# Patient Record
Sex: MALE | Race: ASIAN | NOT HISPANIC OR LATINO | URBAN - METROPOLITAN AREA
[De-identification: names, ages, dates, MRNs, and addresses within clinical notes are randomized per-mention and may not be internally consistent; named-entity substitution may affect disease eponyms.]

---

## 2023-10-12 ENCOUNTER — INPATIENT (INPATIENT)
Facility: HOSPITAL | Age: 68
LOS: 3 days | Discharge: HOME | DRG: 287 | End: 2023-10-16
Attending: STUDENT IN AN ORGANIZED HEALTH CARE EDUCATION/TRAINING PROGRAM | Admitting: STUDENT IN AN ORGANIZED HEALTH CARE EDUCATION/TRAINING PROGRAM
Payer: MEDICAID

## 2023-10-12 VITALS
OXYGEN SATURATION: 98 % | HEART RATE: 85 BPM | TEMPERATURE: 98 F | SYSTOLIC BLOOD PRESSURE: 147 MMHG | HEIGHT: 64 IN | DIASTOLIC BLOOD PRESSURE: 89 MMHG | RESPIRATION RATE: 18 BRPM | WEIGHT: 188.05 LBS

## 2023-10-12 LAB
ALBUMIN SERPL ELPH-MCNC: 4.1 G/DL — SIGNIFICANT CHANGE UP (ref 3.3–5)
ALP SERPL-CCNC: 52 U/L — SIGNIFICANT CHANGE UP (ref 40–120)
ALT FLD-CCNC: 25 U/L — SIGNIFICANT CHANGE UP (ref 10–45)
ANION GAP SERPL CALC-SCNC: 14 MMOL/L — SIGNIFICANT CHANGE UP (ref 5–17)
APPEARANCE UR: CLEAR — SIGNIFICANT CHANGE UP
AST SERPL-CCNC: 30 U/L — SIGNIFICANT CHANGE UP (ref 10–40)
BACTERIA # UR AUTO: NEGATIVE — SIGNIFICANT CHANGE UP
BASOPHILS # BLD AUTO: 0.01 K/UL — SIGNIFICANT CHANGE UP (ref 0–0.2)
BASOPHILS NFR BLD AUTO: 0.1 % — SIGNIFICANT CHANGE UP (ref 0–2)
BILIRUB SERPL-MCNC: 0.2 MG/DL — SIGNIFICANT CHANGE UP (ref 0.2–1.2)
BILIRUB UR-MCNC: NEGATIVE — SIGNIFICANT CHANGE UP
BUN SERPL-MCNC: 18 MG/DL — SIGNIFICANT CHANGE UP (ref 7–23)
CALCIUM SERPL-MCNC: 9.8 MG/DL — SIGNIFICANT CHANGE UP (ref 8.4–10.5)
CHLORIDE SERPL-SCNC: 103 MMOL/L — SIGNIFICANT CHANGE UP (ref 96–108)
CO2 SERPL-SCNC: 22 MMOL/L — SIGNIFICANT CHANGE UP (ref 22–31)
COLOR SPEC: COLORLESS — SIGNIFICANT CHANGE UP
CREAT SERPL-MCNC: 0.97 MG/DL — SIGNIFICANT CHANGE UP (ref 0.5–1.3)
DIFF PNL FLD: NEGATIVE — SIGNIFICANT CHANGE UP
EGFR: 86 ML/MIN/1.73M2 — SIGNIFICANT CHANGE UP
EOSINOPHIL # BLD AUTO: 0 K/UL — SIGNIFICANT CHANGE UP (ref 0–0.5)
EOSINOPHIL NFR BLD AUTO: 0 % — SIGNIFICANT CHANGE UP (ref 0–6)
EPI CELLS # UR: 0 /HPF — SIGNIFICANT CHANGE UP
GLUCOSE SERPL-MCNC: 136 MG/DL — HIGH (ref 70–99)
GLUCOSE UR QL: NEGATIVE — SIGNIFICANT CHANGE UP
HCT VFR BLD CALC: 31.9 % — LOW (ref 39–50)
HGB BLD-MCNC: 10.1 G/DL — LOW (ref 13–17)
HYALINE CASTS # UR AUTO: 1 /LPF — SIGNIFICANT CHANGE UP (ref 0–2)
IMM GRANULOCYTES NFR BLD AUTO: 0.5 % — SIGNIFICANT CHANGE UP (ref 0–0.9)
KETONES UR-MCNC: NEGATIVE — SIGNIFICANT CHANGE UP
LACTATE BLDV-MCNC: 2.6 MMOL/L — HIGH (ref 0.5–2)
LEUKOCYTE ESTERASE UR-ACNC: NEGATIVE — SIGNIFICANT CHANGE UP
LIDOCAIN IGE QN: 23 U/L — SIGNIFICANT CHANGE UP (ref 7–60)
LYMPHOCYTES # BLD AUTO: 1.06 K/UL — SIGNIFICANT CHANGE UP (ref 1–3.3)
LYMPHOCYTES # BLD AUTO: 7.6 % — LOW (ref 13–44)
MAGNESIUM SERPL-MCNC: 2.3 MG/DL — SIGNIFICANT CHANGE UP (ref 1.6–2.6)
MCHC RBC-ENTMCNC: 31.3 PG — SIGNIFICANT CHANGE UP (ref 27–34)
MCHC RBC-ENTMCNC: 31.7 GM/DL — LOW (ref 32–36)
MCV RBC AUTO: 98.8 FL — SIGNIFICANT CHANGE UP (ref 80–100)
MONOCYTES # BLD AUTO: 0.48 K/UL — SIGNIFICANT CHANGE UP (ref 0–0.9)
MONOCYTES NFR BLD AUTO: 3.4 % — SIGNIFICANT CHANGE UP (ref 2–14)
NEUTROPHILS # BLD AUTO: 12.32 K/UL — HIGH (ref 1.8–7.4)
NEUTROPHILS NFR BLD AUTO: 88.4 % — HIGH (ref 43–77)
NITRITE UR-MCNC: NEGATIVE — SIGNIFICANT CHANGE UP
NRBC # BLD: 0 /100 WBCS — SIGNIFICANT CHANGE UP (ref 0–0)
NT-PROBNP SERPL-SCNC: 1530 PG/ML — HIGH (ref 0–300)
PH UR: 6 — SIGNIFICANT CHANGE UP (ref 5–8)
PLATELET # BLD AUTO: 102 K/UL — LOW (ref 150–400)
POTASSIUM SERPL-MCNC: 4.9 MMOL/L — SIGNIFICANT CHANGE UP (ref 3.5–5.3)
POTASSIUM SERPL-SCNC: 4.9 MMOL/L — SIGNIFICANT CHANGE UP (ref 3.5–5.3)
PROT SERPL-MCNC: 7.6 G/DL — SIGNIFICANT CHANGE UP (ref 6–8.3)
PROT UR-MCNC: NEGATIVE — SIGNIFICANT CHANGE UP
RBC # BLD: 3.23 M/UL — LOW (ref 4.2–5.8)
RBC # FLD: 15.8 % — HIGH (ref 10.3–14.5)
RBC CASTS # UR COMP ASSIST: 0 /HPF — SIGNIFICANT CHANGE UP (ref 0–4)
SODIUM SERPL-SCNC: 139 MMOL/L — SIGNIFICANT CHANGE UP (ref 135–145)
SP GR SPEC: 1.01 — LOW (ref 1.01–1.02)
TROPONIN T, HIGH SENSITIVITY RESULT: 11 NG/L — SIGNIFICANT CHANGE UP (ref 0–51)
TROPONIN T, HIGH SENSITIVITY RESULT: 11 NG/L — SIGNIFICANT CHANGE UP (ref 0–51)
UROBILINOGEN FLD QL: NEGATIVE — SIGNIFICANT CHANGE UP
WBC # BLD: 13.94 K/UL — HIGH (ref 3.8–10.5)
WBC # FLD AUTO: 13.94 K/UL — HIGH (ref 3.8–10.5)
WBC UR QL: 0 /HPF — SIGNIFICANT CHANGE UP (ref 0–5)

## 2023-10-12 PROCEDURE — 71275 CT ANGIOGRAPHY CHEST: CPT | Mod: 26,MA

## 2023-10-12 PROCEDURE — 71045 X-RAY EXAM CHEST 1 VIEW: CPT | Mod: 26

## 2023-10-12 PROCEDURE — 99285 EMERGENCY DEPT VISIT HI MDM: CPT

## 2023-10-12 RX ORDER — ASPIRIN/CALCIUM CARB/MAGNESIUM 324 MG
324 TABLET ORAL ONCE
Refills: 0 | Status: COMPLETED | OUTPATIENT
Start: 2023-10-12 | End: 2023-10-12

## 2023-10-12 RX ADMIN — Medication 324 MILLIGRAM(S): at 16:43

## 2023-10-12 NOTE — ED PROVIDER NOTE - PROGRESS NOTE DETAILS
spoke with pts oncologist Dr. Block who states chemo regimen can cause reversible heart failure, recommends echocardiogram and troponin trending -Saba Dooley PA-C

## 2023-10-12 NOTE — ED ADULT NURSE NOTE - OBJECTIVE STATEMENT
67 year old male pt presented to the ED accompanied by son in law who will translate Julisa as per pt, pt having sob for the past few days with increasing dyspnea on exertion since this am and chest heaviness pt is a non smoker, pt is A&Ox3 ambulatory abd soft non tender non distended pt denies fever chills no nausea no vomiting no diaphoresis no palpitations

## 2023-10-12 NOTE — ED PROVIDER NOTE - RAPID ASSESSMENT
67-year-old male PMH of multiple myeloma on Kyprolis and Darzalex presents to the ED complaining of acute onset chest pain this morning.  Patient is Julisa speaking, his son-in-law translates with patient permission, Ray Pabon.  Patient reports since this morning he has no chest pain at rest but has severe crushing chest pain with taking even 3 steps, called his outpatient providers who referred him to the emergency department.  Patient denies history of known CAD or stent placement, is a non-smoker. No ASA taken today, denies h/o AC use. Denies SOB.    Patient was seen as a rapid assessment (QPA) patient. The patient will be seen and further worked up in the main emergency department and their care will be completed by the main emergency department team along with a thorough physical exam. Receiving team will follow up on labs, analgesia, any clinical imaging, reassess and disposition as clinically indicated, all decisions regarding the progression of care will be made at their discretion. - Clifford Prado PA-C

## 2023-10-12 NOTE — ED PROVIDER NOTE - PHYSICAL EXAMINATION
A&Ox3, NAD  Lungs CTAB. No w/r/r  Cardiac  RRR  Abd soft, NT/ND  Extremities: cap refill <2, pulses in distal extremities 4+, no edema.   Skin without rash.   No focal Deficits, Strength 5/5 UE/LE.

## 2023-10-12 NOTE — ED ADULT TRIAGE NOTE - CHIEF COMPLAINT QUOTE
chest pain since yesterday. palpitation and SOB with minimal exertion today. currently being treated for multiple myeloma.

## 2023-10-12 NOTE — ED PROVIDER NOTE - CONSIDERATION OF ADMISSION OBSERVATION
Mult myoloma drug have cardiac side effects which could be dangerous to pt. Consideration of Admission/Observation

## 2023-10-12 NOTE — ED CLERICAL - NS ED CLERK NOTE PRE-ARRIVAL INFORMATION; ADDITIONAL PRE-ARRIVAL INFORMATION
CC/Reason For referral: CP, SOB. hx of multiple myeloma, on Kyprolis and Darzalex. Pt. had a recent echo prior to starting medication. Needs echo, EKG.   Preferred Consultant(if applicable):  Who admits for you (if needed):  Do you have documents you would like to fax over?  Would you still like to speak to an ED attending? Yes

## 2023-10-12 NOTE — ED PROVIDER NOTE - OBJECTIVE STATEMENT
66 yo male with pmh hypothyroidism, multiple myeloma here for evaluation of chest discomfort and shortness of breath for the past week, worsening over the past 1-2 days. Pt recently started new chemo treatment yesterday, afterwards and today has been having worsening VELAZCO and chest discomfort with ambulation. Symptoms improved with rest, denies cough, fevers, chills, nausea, vomiting, abdominal pain, no cardiac history.

## 2023-10-12 NOTE — ED PROVIDER NOTE - CLINICAL SUMMARY MEDICAL DECISION MAKING FREE TEXT BOX
Robson:67-year-old male PMH of multiple myeloma on Kyprolis and Darzalex. Patient with atypical story. Patient also at risk for PE. will get ekg and trop as well as CT chest for PE. Concern for drug related cardiac effects will necessitate admission for echo and cards eval.

## 2023-10-13 DIAGNOSIS — J45.909 UNSPECIFIED ASTHMA, UNCOMPLICATED: ICD-10-CM

## 2023-10-13 DIAGNOSIS — R06.00 DYSPNEA, UNSPECIFIED: ICD-10-CM

## 2023-10-13 DIAGNOSIS — Z29.9 ENCOUNTER FOR PROPHYLACTIC MEASURES, UNSPECIFIED: ICD-10-CM

## 2023-10-13 DIAGNOSIS — I20.89 OTHER FORMS OF ANGINA PECTORIS: ICD-10-CM

## 2023-10-13 DIAGNOSIS — C90.00 MULTIPLE MYELOMA NOT HAVING ACHIEVED REMISSION: ICD-10-CM

## 2023-10-13 DIAGNOSIS — E03.9 HYPOTHYROIDISM, UNSPECIFIED: ICD-10-CM

## 2023-10-13 DIAGNOSIS — R06.09 OTHER FORMS OF DYSPNEA: ICD-10-CM

## 2023-10-13 LAB
A1C WITH ESTIMATED AVERAGE GLUCOSE RESULT: 5.7 % — HIGH (ref 4–5.6)
ANION GAP SERPL CALC-SCNC: 12 MMOL/L — SIGNIFICANT CHANGE UP (ref 5–17)
BUN SERPL-MCNC: 24 MG/DL — HIGH (ref 7–23)
CALCIUM SERPL-MCNC: 9.3 MG/DL — SIGNIFICANT CHANGE UP (ref 8.4–10.5)
CHLORIDE SERPL-SCNC: 104 MMOL/L — SIGNIFICANT CHANGE UP (ref 96–108)
CHOLEST SERPL-MCNC: 197 MG/DL — SIGNIFICANT CHANGE UP
CO2 SERPL-SCNC: 25 MMOL/L — SIGNIFICANT CHANGE UP (ref 22–31)
CREAT SERPL-MCNC: 1.4 MG/DL — HIGH (ref 0.5–1.3)
EGFR: 55 ML/MIN/1.73M2 — LOW
ESTIMATED AVERAGE GLUCOSE: 117 MG/DL — HIGH (ref 68–114)
GLUCOSE SERPL-MCNC: 112 MG/DL — HIGH (ref 70–99)
HCT VFR BLD CALC: 31 % — LOW (ref 39–50)
HDLC SERPL-MCNC: 49 MG/DL — SIGNIFICANT CHANGE UP
HGB BLD-MCNC: 10 G/DL — LOW (ref 13–17)
LIPID PNL WITH DIRECT LDL SERPL: 109 MG/DL — HIGH
MCHC RBC-ENTMCNC: 31.1 PG — SIGNIFICANT CHANGE UP (ref 27–34)
MCHC RBC-ENTMCNC: 32.3 GM/DL — SIGNIFICANT CHANGE UP (ref 32–36)
MCV RBC AUTO: 96.3 FL — SIGNIFICANT CHANGE UP (ref 80–100)
NON HDL CHOLESTEROL: 147 MG/DL — HIGH
NRBC # BLD: 0 /100 WBCS — SIGNIFICANT CHANGE UP (ref 0–0)
PHOSPHATE SERPL-MCNC: 3.4 MG/DL — SIGNIFICANT CHANGE UP (ref 2.5–4.5)
PLATELET # BLD AUTO: 105 K/UL — LOW (ref 150–400)
POTASSIUM SERPL-MCNC: 4 MMOL/L — SIGNIFICANT CHANGE UP (ref 3.5–5.3)
POTASSIUM SERPL-SCNC: 4 MMOL/L — SIGNIFICANT CHANGE UP (ref 3.5–5.3)
RBC # BLD: 3.22 M/UL — LOW (ref 4.2–5.8)
RBC # FLD: 15.9 % — HIGH (ref 10.3–14.5)
SODIUM SERPL-SCNC: 141 MMOL/L — SIGNIFICANT CHANGE UP (ref 135–145)
TRIGL SERPL-MCNC: 223 MG/DL — HIGH
TSH SERPL-MCNC: 0.5 UIU/ML — SIGNIFICANT CHANGE UP (ref 0.27–4.2)
WBC # BLD: 11.12 K/UL — HIGH (ref 3.8–10.5)
WBC # FLD AUTO: 11.12 K/UL — HIGH (ref 3.8–10.5)

## 2023-10-13 PROCEDURE — 99223 1ST HOSP IP/OBS HIGH 75: CPT

## 2023-10-13 PROCEDURE — 93306 TTE W/DOPPLER COMPLETE: CPT | Mod: 26

## 2023-10-13 RX ORDER — ASPIRIN/CALCIUM CARB/MAGNESIUM 324 MG
81 TABLET ORAL DAILY
Refills: 0 | Status: DISCONTINUED | OUTPATIENT
Start: 2023-10-13 | End: 2023-10-14

## 2023-10-13 RX ORDER — ONDANSETRON 8 MG/1
4 TABLET, FILM COATED ORAL EVERY 8 HOURS
Refills: 0 | Status: DISCONTINUED | OUTPATIENT
Start: 2023-10-13 | End: 2023-10-16

## 2023-10-13 RX ORDER — MONTELUKAST 4 MG/1
10 TABLET, CHEWABLE ORAL DAILY
Refills: 0 | Status: DISCONTINUED | OUTPATIENT
Start: 2023-10-13 | End: 2023-10-16

## 2023-10-13 RX ORDER — ATORVASTATIN CALCIUM 80 MG/1
40 TABLET, FILM COATED ORAL AT BEDTIME
Refills: 0 | Status: DISCONTINUED | OUTPATIENT
Start: 2023-10-13 | End: 2023-10-16

## 2023-10-13 RX ORDER — ACETAMINOPHEN 500 MG
650 TABLET ORAL EVERY 6 HOURS
Refills: 0 | Status: DISCONTINUED | OUTPATIENT
Start: 2023-10-13 | End: 2023-10-16

## 2023-10-13 RX ORDER — SODIUM CHLORIDE 9 MG/ML
1000 INJECTION INTRAMUSCULAR; INTRAVENOUS; SUBCUTANEOUS
Refills: 0 | Status: DISCONTINUED | OUTPATIENT
Start: 2023-10-13 | End: 2023-10-16

## 2023-10-13 RX ORDER — ACYCLOVIR SODIUM 500 MG
400 VIAL (EA) INTRAVENOUS DAILY
Refills: 0 | Status: DISCONTINUED | OUTPATIENT
Start: 2023-10-13 | End: 2023-10-16

## 2023-10-13 RX ORDER — LEVOTHYROXINE SODIUM 125 MCG
50 TABLET ORAL DAILY
Refills: 0 | Status: DISCONTINUED | OUTPATIENT
Start: 2023-10-13 | End: 2023-10-16

## 2023-10-13 RX ORDER — LANOLIN ALCOHOL/MO/W.PET/CERES
3 CREAM (GRAM) TOPICAL AT BEDTIME
Refills: 0 | Status: DISCONTINUED | OUTPATIENT
Start: 2023-10-13 | End: 2023-10-16

## 2023-10-13 RX ORDER — ENOXAPARIN SODIUM 100 MG/ML
40 INJECTION SUBCUTANEOUS EVERY 24 HOURS
Refills: 0 | Status: DISCONTINUED | OUTPATIENT
Start: 2023-10-13 | End: 2023-10-14

## 2023-10-13 RX ORDER — LORATADINE 10 MG/1
10 TABLET ORAL DAILY
Refills: 0 | Status: DISCONTINUED | OUTPATIENT
Start: 2023-10-13 | End: 2023-10-16

## 2023-10-13 RX ADMIN — Medication 3 MILLIGRAM(S): at 21:03

## 2023-10-13 RX ADMIN — Medication 400 MILLIGRAM(S): at 16:19

## 2023-10-13 RX ADMIN — SODIUM CHLORIDE 75 MILLILITER(S): 9 INJECTION INTRAMUSCULAR; INTRAVENOUS; SUBCUTANEOUS at 16:25

## 2023-10-13 RX ADMIN — MONTELUKAST 10 MILLIGRAM(S): 4 TABLET, CHEWABLE ORAL at 12:37

## 2023-10-13 RX ADMIN — ATORVASTATIN CALCIUM 40 MILLIGRAM(S): 80 TABLET, FILM COATED ORAL at 21:04

## 2023-10-13 RX ADMIN — ENOXAPARIN SODIUM 40 MILLIGRAM(S): 100 INJECTION SUBCUTANEOUS at 06:07

## 2023-10-13 RX ADMIN — LORATADINE 10 MILLIGRAM(S): 10 TABLET ORAL at 12:37

## 2023-10-13 RX ADMIN — Medication 50 MICROGRAM(S): at 06:07

## 2023-10-13 NOTE — H&P ADULT - PROBLEM SELECTOR PLAN 2
-hematologist: Dr. Agustin Block @ [facility].   -obtain outside record. -hematologist: Dr. Agustin Block @ [facility].   -obtain outside record.  -c/w acylovir ppx  -*of note, unable to find patient on [facility]  for oxycodone. Reportedly only needs it once a week or so. Did not order.

## 2023-10-13 NOTE — H&P ADULT - PROBLEM SELECTOR PLAN 1
-r/o'd PE  -no sx at rest, trop neg x2, EKG w/o ischemic changes.   -ordered for nuclear stress test and echo   -given no resp symptom, will hold off and abx for questionable infectious process found on the CT chest. -chest pressure with shortness of breath on exertion; relieved w/ rest  -also, i/s/o anemia.   -trop neg x2,  pro-BNP 1530 and EKG w/o acute ischemic changes.   -s/p full dose ASA. No concern for ACS at this time.   -recommend cardiology consult in the morning  *pt reports he had echocardiogram done 2-3 days ago at his hematologist office before starting new chemo. Obtain outside record. -chest pressure with shortness of breath on exertion; relieved w/ rest  -also, i/s/o anemia.   -trop neg x2,  pro-BNP 1530 and EKG w/o acute ischemic changes.   -s/p full dose ASA. No concern for ACS at this time.   -recommend cardiology consult in the morning (reports never saw cardiologist before)  *pt reports he had echocardiogram done 2-3 days ago at his hematologist office before starting new chemo. Obtain outside record.

## 2023-10-13 NOTE — PATIENT PROFILE ADULT - FALL HARM RISK - HARM RISK INTERVENTIONS

## 2023-10-13 NOTE — H&P ADULT - NSHPLABSRESULTS_GEN_ALL_CORE
I personally reviewed the EKG: normal sinus rhythm, no acute ischemic changes  I personally reviewed the CXR: no acute cardpulm process    CBC:                         10.1   13.94 )-----------( 102      ( 12 Oct 2023 16:47 )             31.9       Chem:   10-12    139  |  103  |  18  ----------------------------<  136<H>  4.9   |  22  |  0.97    Ca    9.8      12 Oct 2023 16:47  Mg     2.3     10-12    TPro  7.6  /  Alb  4.1  /  TBili  0.2  /  DBili  x   /  AST  30  /  ALT  25  /  AlkPhos  52  10-12

## 2023-10-13 NOTE — H&P ADULT - HISTORY OF PRESENT ILLNESS
67M PMhx significant for MM started on Kyprolis and Darzalex.  Presenting w/ crushing chest pain w/ minimal ambulation.     Trop neg x2,  and EKG w/o ischemic changes. CTA Chest neg for PE, no cardiomegaly or pericardial effusion, no pleural effusion, b/l base bronchial thickening.   CBC w/ leukocytosis and anemia (unknown BL), thrombocytopenia (unknown baseline). CMP unremarkable, and pro-BNP 1530 (unknown baseline). Lactic acid level 2.6.   UA unremarkable.   67M PMhx significant for MM, was on different chemo previously but recently started on Kyprolis and Darzalex.  Pt reports 3-4 weeks of fatigue but attributed to chemo.   Then, on 10/12, pt had severe chest pressure (indicates whole anterior chest) with shortness of breath while ambulating on flat surface; denies diaphoresis, radiation of the pain, palpitation nausea, or vomiting. It resolved w/ rest in few minutes.     On presentation to ED, mildly hypertensive, HR 85, afebrile, and on room air.   Trop neg x2,  and EKG w/o ischemic changes. CTA Chest neg for PE, no cardiomegaly or pericardial effusion, no pleural effusion, b/l base bronchial thickening.   CBC w/ leukocytosis and anemia (unknown BL), thrombocytopenia (unknown baseline). CMP unremarkable, and pro-BNP 1530 (unknown baseline). Lactic acid level 2.6.   UA unremarkable.      Pt received full dose asa in the ED.   Pt reports that since he arrived to ED, he ambulated to bathroom few times; he had mild chest pain with first couple times, but then he did not afterwards.  67M PMhx significant for  hypothyroidism and asthma, MM, was on different chemo previously but recently started on Kyprolis and Darzalex.  Pt reports 3-4 weeks of fatigue but attributed to chemo.   Then, on 10/12, pt had severe chest pressure (indicates whole anterior chest) with shortness of breath while ambulating on flat surface; denies diaphoresis, radiation of the pain, palpitation nausea, or vomiting. It resolved w/ rest in few minutes.     On presentation to ED, mildly hypertensive, HR 85, afebrile, and on room air.   Trop neg x2,  and EKG w/o ischemic changes. CTA Chest neg for PE, no cardiomegaly or pericardial effusion, no pleural effusion, b/l base bronchial thickening.   CBC w/ leukocytosis and anemia (unknown BL), thrombocytopenia (unknown baseline). CMP unremarkable, and pro-BNP 1530 (unknown baseline). Lactic acid level 2.6.   UA unremarkable.      Pt received full dose asa in the ED.   Pt reports that since he arrived to ED, he ambulated to bathroom few times; he had mild chest pain with first couple times, but then he did not afterwards.     At this time, pt denies HA, dizziness, fever, chills, CP, SOB, palpitation, abd pain, n/v/d, urinary sx or any other sx.

## 2023-10-13 NOTE — CONSULT NOTE ADULT - SUBJECTIVE AND OBJECTIVE BOX
DATE OF SERVICE: 10-13-23 @ 17:25    CHIEF COMPLAINT:Patient is a 67y old  Male who presents with a chief complaint of chest pain (13 Oct 2023 01:48)      HISTORY OF PRESENT ILLNESS:HPI:  67M PMhx significant for  hypothyroidism and asthma, MM, was on different chemo previously but recently started on Kyprolis and Darzalex.  Pt reports 3-4 weeks of fatigue but attributed to chemo.   Then, on 10/12, pt had severe chest pressure (indicates whole anterior chest) with shortness of breath while ambulating on flat surface; denies diaphoresis, radiation of the pain, palpitation nausea, or vomiting. It resolved w/ rest in few minutes.     On presentation to ED, mildly hypertensive, HR 85, afebrile, and on room air.   Trop neg x2,  and EKG w/o ischemic changes. CTA Chest neg for PE, no cardiomegaly or pericardial effusion, no pleural effusion, b/l base bronchial thickening.   CBC w/ leukocytosis and anemia (unknown BL), thrombocytopenia (unknown baseline). CMP unremarkable, and pro-BNP 1530 (unknown baseline). Lactic acid level 2.6.   UA unremarkable.      Pt received full dose asa in the ED.   Pt reports that since he arrived to ED, he ambulated to bathroom few times; he had mild chest pain with first couple times, but then he did not afterwards.     At this time, pt denies HA, dizziness, fever, chills, CP, SOB, palpitation, abd pain, n/v/d, urinary sx or any other sx.  (13 Oct 2023 01:48)      PAST MEDICAL & SURGICAL HISTORY:  Hypothyroidism      Multiple myeloma      Asthma              MEDICATIONS:  enoxaparin Injectable 40 milliGRAM(s) SubCutaneous every 24 hours    acyclovir   Oral Tab/Cap 400 milliGRAM(s) Oral daily    loratadine 10 milliGRAM(s) Oral daily  montelukast 10 milliGRAM(s) Oral daily    acetaminophen     Tablet .. 650 milliGRAM(s) Oral every 6 hours PRN  melatonin 3 milliGRAM(s) Oral at bedtime PRN  ondansetron Injectable 4 milliGRAM(s) IV Push every 8 hours PRN    aluminum hydroxide/magnesium hydroxide/simethicone Suspension 30 milliLiter(s) Oral every 4 hours PRN    levothyroxine 50 MICROGram(s) Oral daily    sodium chloride 0.9%. 1000 milliLiter(s) IV Continuous <Continuous>      FAMILY HISTORY:  FHx: leukemia        Non-contributory    SOCIAL HISTORY:    Not an active smoker    Allergies    sulfa drugs (Unknown)    Intolerances    	    REVIEW OF SYSTEMS:  CONSTITUTIONAL: No fever  EYES: No eye pain, visual disturbances, or discharge  ENMT:  No difficulty hearing, tinnitus  NECK: No pain or stiffness  RESPIRATORY: No cough, wheezing,  CARDIOVASCULAR: + chest pain, palpitations, passing out, dizziness, or leg swelling  GASTROINTESTINAL:  No nausea, vomiting, diarrhea or constipation. No melena.  GENITOURINARY: No dysuria, hematuria  NEUROLOGICAL: No stroke like symptoms  SKIN: No burning or lesions   ENDOCRINE: No heat or cold intolerance  MUSCULOSKELETAL: No joint pain or swelling  PSYCHIATRIC: No  anxiety, mood swings  HEME/LYMPH: No bleeding gums  ALLERGY AND IMMUNOLOGIC: No hives or eczema	    All other ROS negative    PHYSICAL EXAM:  T(C): 36.6 (10-13-23 @ 17:16), Max: 36.7 (10-13-23 @ 05:56)  HR: 62 (10-13-23 @ 17:16) (62 - 88)  BP: 96/63 (10-13-23 @ 17:16) (96/63 - 167/97)  RR: 18 (10-13-23 @ 17:16) (16 - 18)  SpO2: 95% (10-13-23 @ 17:16) (95% - 99%)  Wt(kg): --  I&O's Summary      Appearance: Normal	  HEENT:   Normal oral mucosa, EOMI	  Cardiovascular:  S1 S2, No JVD,    Respiratory: Lungs clear to auscultation	  Psychiatry: Alert  Gastrointestinal:  Soft, Non-tender, + BS	  Skin: No rashes   Neurologic: Non-focal  Extremities:  No edema  Vascular: Peripheral pulses palpable    	    	  	  CARDIAC MARKERS:  Labs personally reviewed by me                                  10.0   11.12 )-----------( 105      ( 13 Oct 2023 07:22 )             31.0     10-13    141  |  104  |  24<H>  ----------------------------<  112<H>  4.0   |  25  |  1.40<H>    Ca    9.3      13 Oct 2023 07:22  Phos  3.4     10-13  Mg     2.3     10-12    TPro  7.6  /  Alb  4.1  /  TBili  0.2  /  DBili  x   /  AST  30  /  ALT  25  /  AlkPhos  52  10-12          EKG: Personally reviewed by me - NSR  Radiology: Personally reviewed by me -     < from: Xray Chest 1 View- PORTABLE-Urgent (10.12.23 @ 18:17) >  Questionable right upper lobe opacity, recommend correlation with already   ordered CT chest.    < end of copied text >  < from: CT Angio Chest PE Protocol w/ IV Cont (10.12.23 @ 21:54) >  No pulmonary embolism.  Nonspecific trace bilateral lower lobe bronchial wall thickening which   can be seen with infectious or inflammatory bronchitis.  Scattered pulmonary nodules. There are no prior chest CTs for comparison   recommend a 6 month follow-up CT.        Assessment /Plan:     67M PMhx significant for  hypothyroidism and asthma, MM, was on different chemo previously but recently started on Kyprolis and Darzalex.  Pt reports 3-4 weeks of fatigue but attributed to chemo. Then, on 10/12, pt had severe chest pressure (indicates whole anterior chest) with shortness of breath while ambulating on flat surface; denies diaphoresis, radiation of the pain, palpitation nausea, or vomiting. It resolved w/ rest in few minutes.     1. Chest Pain  - Described as severe, diffuse chest discomfort which occurred with exertion and a/w SOB  - ECG NSR with no ischemic characteristics noted  - Trop neg x2  - CT Chest neg for PE  - BNP 1530  - Start ASA 81mg PO daily, atorvastatin 40mg PO daily  - Plan for cardiac cath Sunday if CAITY resolved.    2. Hypertension  - No formal Hx of HTN  - BP elevated on presentation but now normotensive/soft BP  - Will cont to trend    3. DVT Ppx  - c/w Lovenox 40mg SQ daily    4. Hypothyroidism  - TSH noted  - c/w Synthroid    Differential diagnosis and plan of care discussed with patient after the evaluation. Counseling on diet, nutritional counseling, weight management, exercise and medication compliance was done.   Advanced care planning/advanced directives discussed with patient/family. DNR status including forceful chest compressions to attempt to restart the heart, ventilator support/artificial breathing, electric shock, artificial nutrition, health care proxy, Molst form all discussed with pt. Pt wishes to consider. More than fifteen minutes spent on discussing advanced directives.       Hema Lane DO [facility]  Cardiovascular Medicine  [address] [place] [address] Dr, [address]  Available for call or text via [employer]  Office [phone]   DATE OF SERVICE: 10-13-23 @ 17:25    CHIEF COMPLAINT:Patient is a 67y old  Male who presents with a chief complaint of chest pain (13 Oct 2023 01:48)      HISTORY OF PRESENT ILLNESS:HPI:  67M PMhx significant for  hypothyroidism and asthma, MM, was on different chemo previously but recently started on Kyprolis and Darzalex.  Pt reports 3-4 weeks of fatigue but attributed to chemo.   Then, on 10/12, pt had severe chest pressure (indicates whole anterior chest) with shortness of breath while ambulating on flat surface; denies diaphoresis, radiation of the pain, palpitation nausea, or vomiting. It resolved w/ rest in few minutes.     On presentation to ED, mildly hypertensive, HR 85, afebrile, and on room air.   Trop neg x2,  and EKG w/o ischemic changes. CTA Chest neg for PE, no cardiomegaly or pericardial effusion, no pleural effusion, b/l base bronchial thickening.   CBC w/ leukocytosis and anemia (unknown BL), thrombocytopenia (unknown baseline). CMP unremarkable, and pro-BNP 1530 (unknown baseline). Lactic acid level 2.6.   UA unremarkable.      Pt received full dose asa in the ED.   Pt reports that since he arrived to ED, he ambulated to bathroom few times; he had mild chest pain with first couple times, but then he did not afterwards.     At this time, pt denies HA, dizziness, fever, chills, CP, SOB, palpitation, abd pain, n/v/d, urinary sx or any other sx.  (13 Oct 2023 01:48)      PAST MEDICAL & SURGICAL HISTORY:  Hypothyroidism      Multiple myeloma      Asthma              MEDICATIONS:  enoxaparin Injectable 40 milliGRAM(s) SubCutaneous every 24 hours    acyclovir   Oral Tab/Cap 400 milliGRAM(s) Oral daily    loratadine 10 milliGRAM(s) Oral daily  montelukast 10 milliGRAM(s) Oral daily    acetaminophen     Tablet .. 650 milliGRAM(s) Oral every 6 hours PRN  melatonin 3 milliGRAM(s) Oral at bedtime PRN  ondansetron Injectable 4 milliGRAM(s) IV Push every 8 hours PRN    aluminum hydroxide/magnesium hydroxide/simethicone Suspension 30 milliLiter(s) Oral every 4 hours PRN    levothyroxine 50 MICROGram(s) Oral daily    sodium chloride 0.9%. 1000 milliLiter(s) IV Continuous <Continuous>      FAMILY HISTORY:  FHx: leukemia        Non-contributory    SOCIAL HISTORY:    Not an active smoker    Allergies    sulfa drugs (Unknown)    Intolerances    	    REVIEW OF SYSTEMS:  CONSTITUTIONAL: No fever  EYES: No eye pain, visual disturbances, or discharge  ENMT:  No difficulty hearing, tinnitus  NECK: No pain or stiffness  RESPIRATORY: No cough, wheezing,  CARDIOVASCULAR: + chest pain, palpitations, passing out, dizziness, or leg swelling  GASTROINTESTINAL:  No nausea, vomiting, diarrhea or constipation. No melena.  GENITOURINARY: No dysuria, hematuria  NEUROLOGICAL: No stroke like symptoms  SKIN: No burning or lesions   ENDOCRINE: No heat or cold intolerance  MUSCULOSKELETAL: No joint pain or swelling  PSYCHIATRIC: No  anxiety, mood swings  HEME/LYMPH: No bleeding gums  ALLERGY AND IMMUNOLOGIC: No hives or eczema	    All other ROS negative    PHYSICAL EXAM:  T(C): 36.6 (10-13-23 @ 17:16), Max: 36.7 (10-13-23 @ 05:56)  HR: 62 (10-13-23 @ 17:16) (62 - 88)  BP: 96/63 (10-13-23 @ 17:16) (96/63 - 167/97)  RR: 18 (10-13-23 @ 17:16) (16 - 18)  SpO2: 95% (10-13-23 @ 17:16) (95% - 99%)  Wt(kg): --  I&O's Summary      Appearance: Normal	  HEENT:   Normal oral mucosa, EOMI	  Cardiovascular:  S1 S2, No JVD,    Respiratory: Lungs clear to auscultation	  Psychiatry: Alert  Gastrointestinal:  Soft, Non-tender, + BS	  Skin: No rashes   Neurologic: Non-focal  Extremities:  No edema  Vascular: Peripheral pulses palpable    	    	  	  CARDIAC MARKERS:  Labs personally reviewed by me                                  10.0   11.12 )-----------( 105      ( 13 Oct 2023 07:22 )             31.0     10-13    141  |  104  |  24<H>  ----------------------------<  112<H>  4.0   |  25  |  1.40<H>    Ca    9.3      13 Oct 2023 07:22  Phos  3.4     10-13  Mg     2.3     10-12    TPro  7.6  /  Alb  4.1  /  TBili  0.2  /  DBili  x   /  AST  30  /  ALT  25  /  AlkPhos  52  10-12          EKG: Personally reviewed by me - NSR  Radiology: Personally reviewed by me -     < from: Xray Chest 1 View- PORTABLE-Urgent (10.12.23 @ 18:17) >  Questionable right upper lobe opacity, recommend correlation with already   ordered CT chest.    < end of copied text >  < from: CT Angio Chest PE Protocol w/ IV Cont (10.12.23 @ 21:54) >  No pulmonary embolism.  Nonspecific trace bilateral lower lobe bronchial wall thickening which   can be seen with infectious or inflammatory bronchitis.  Scattered pulmonary nodules. There are no prior chest CTs for comparison   recommend a 6 month follow-up CT.        Assessment /Plan:     67M PMhx significant for  hypothyroidism and asthma, MM, was on different chemo previously but recently started on Kyprolis and Darzalex.  Pt reports 3-4 weeks of fatigue but attributed to chemo. Then, on 10/12, pt had severe chest pressure (indicates whole anterior chest) with shortness of breath while ambulating on flat surface; denies diaphoresis, radiation of the pain, palpitation nausea, or vomiting. It resolved w/ rest in few minutes.     1. Chest Pain  - Described as severe, diffuse chest discomfort which occurred with exertion and a/w SOB  - ECG NSR with no ischemic characteristics noted  - Trop neg x2  - CT Chest neg for PE  - BNP 1530  - Obtain formal TTE to assess LVEF and r/o WMA  - Start ASA 81mg PO daily, atorvastatin 40mg PO daily  - Plan for cardiac cath Sunday if CAITY resolved.    2. Hypertension  - No formal Hx of HTN  - BP elevated on presentation but now normotensive/soft BP  - Will cont to trend    3. DVT Ppx  - c/w Lovenox 40mg SQ daily    4. Hypothyroidism  - TSH noted  - c/w Synthroid    Differential diagnosis and plan of care discussed with patient after the evaluation. Counseling on diet, nutritional counseling, weight management, exercise and medication compliance was done.   Advanced care planning/advanced directives discussed with patient/family. DNR status including forceful chest compressions to attempt to restart the heart, ventilator support/artificial breathing, electric shock, artificial nutrition, health care proxy, Molst form all discussed with pt. Pt wishes to consider. More than fifteen minutes spent on discussing advanced directives.       Hema Lane DO [facility]  Cardiovascular Medicine  [address], [address]  Available for call or text via [employer] TE[employer]  Office [phone]   DATE OF SERVICE: 10-13-23 @ 17:25    CHIEF COMPLAINT:Patient is a 67y old  Male who presents with a chief complaint of chest pain (13 Oct 2023 01:48)      HISTORY OF PRESENT ILLNESS:HPI:  67M PMhx significant for  hypothyroidism and asthma, MM, was on different chemo previously but recently started on Kyprolis and Darzalex.  Pt reports 3-4 weeks of fatigue but attributed to chemo.   Then, on 10/12, pt had severe chest pressure (indicates whole anterior chest) with shortness of breath while ambulating on flat surface; denies diaphoresis, radiation of the pain, palpitation nausea, or vomiting. It resolved w/ rest in few minutes.     On presentation to ED, mildly hypertensive, HR 85, afebrile, and on room air.   Trop neg x2,  and EKG w/o ischemic changes. CTA Chest neg for PE, no cardiomegaly or pericardial effusion, no pleural effusion, b/l base bronchial thickening.   CBC w/ leukocytosis and anemia (unknown BL), thrombocytopenia (unknown baseline). CMP unremarkable, and pro-BNP 1530 (unknown baseline). Lactic acid level 2.6.   UA unremarkable.      Pt received full dose asa in the ED.   Pt reports that since he arrived to ED, he ambulated to bathroom few times; he had mild chest pain with first couple times, but then he did not afterwards.     At this time, pt denies HA, dizziness, fever, chills, CP, SOB, palpitation, abd pain, n/v/d, urinary sx or any other sx.  (13 Oct 2023 01:48)      PAST MEDICAL & SURGICAL HISTORY:  Hypothyroidism      Multiple myeloma      Asthma              MEDICATIONS:  enoxaparin Injectable 40 milliGRAM(s) SubCutaneous every 24 hours    acyclovir   Oral Tab/Cap 400 milliGRAM(s) Oral daily    loratadine 10 milliGRAM(s) Oral daily  montelukast 10 milliGRAM(s) Oral daily    acetaminophen     Tablet .. 650 milliGRAM(s) Oral every 6 hours PRN  melatonin 3 milliGRAM(s) Oral at bedtime PRN  ondansetron Injectable 4 milliGRAM(s) IV Push every 8 hours PRN    aluminum hydroxide/magnesium hydroxide/simethicone Suspension 30 milliLiter(s) Oral every 4 hours PRN    levothyroxine 50 MICROGram(s) Oral daily    sodium chloride 0.9%. 1000 milliLiter(s) IV Continuous <Continuous>      FAMILY HISTORY:  FHx: leukemia        Non-contributory    SOCIAL HISTORY:    Not an active smoker    Allergies    sulfa drugs (Unknown)    Intolerances    	    REVIEW OF SYSTEMS:  CONSTITUTIONAL: No fever  EYES: No eye pain, visual disturbances, or discharge  ENMT:  No difficulty hearing, tinnitus  NECK: No pain or stiffness  RESPIRATORY: No cough, wheezing,  CARDIOVASCULAR: + chest pain, palpitations, passing out, dizziness, or leg swelling  GASTROINTESTINAL:  No nausea, vomiting, diarrhea or constipation. No melena.  GENITOURINARY: No dysuria, hematuria  NEUROLOGICAL: No stroke like symptoms  SKIN: No burning or lesions   ENDOCRINE: No heat or cold intolerance  MUSCULOSKELETAL: No joint pain or swelling  PSYCHIATRIC: No  anxiety, mood swings  HEME/LYMPH: No bleeding gums  ALLERGY AND IMMUNOLOGIC: No hives or eczema	    All other ROS negative    PHYSICAL EXAM:  T(C): 36.6 (10-13-23 @ 17:16), Max: 36.7 (10-13-23 @ 05:56)  HR: 62 (10-13-23 @ 17:16) (62 - 88)  BP: 96/63 (10-13-23 @ 17:16) (96/63 - 167/97)  RR: 18 (10-13-23 @ 17:16) (16 - 18)  SpO2: 95% (10-13-23 @ 17:16) (95% - 99%)  Wt(kg): --  I&O's Summary      Appearance: Normal	  HEENT:   Normal oral mucosa, EOMI	  Cardiovascular:  S1 S2, No JVD,    Respiratory: Lungs clear to auscultation	  Psychiatry: Alert  Gastrointestinal:  Soft, Non-tender, + BS	  Skin: No rashes   Neurologic: Non-focal  Extremities:  No edema  Vascular: Peripheral pulses palpable    	    	  	  CARDIAC MARKERS:  Labs personally reviewed by me                                  10.0   11.12 )-----------( 105      ( 13 Oct 2023 07:22 )             31.0     10-13    141  |  104  |  24<H>  ----------------------------<  112<H>  4.0   |  25  |  1.40<H>    Ca    9.3      13 Oct 2023 07:22  Phos  3.4     10-13  Mg     2.3     10-12    TPro  7.6  /  Alb  4.1  /  TBili  0.2  /  DBili  x   /  AST  30  /  ALT  25  /  AlkPhos  52  10-12          EKG: Personally reviewed by me - NSR  Radiology: Personally reviewed by me -     < from: Xray Chest 1 View- PORTABLE-Urgent (10.12.23 @ 18:17) >  Questionable right upper lobe opacity, recommend correlation with already   ordered CT chest.    < end of copied text >  < from: CT Angio Chest PE Protocol w/ IV Cont (10.12.23 @ 21:54) >  No pulmonary embolism.  Nonspecific trace bilateral lower lobe bronchial wall thickening which   can be seen with infectious or inflammatory bronchitis.  Scattered pulmonary nodules. There are no prior chest CTs for comparison   recommend a 6 month follow-up CT.        Assessment /Plan:     67M PMhx significant for  hypothyroidism and asthma, MM, was on different chemo previously but recently started on Kyprolis and Darzalex.  Pt reports 3-4 weeks of fatigue but attributed to chemo. Then, on 10/12, pt had severe chest pressure (indicates whole anterior chest) with shortness of breath while ambulating on flat surface; denies diaphoresis, radiation of the pain, palpitation nausea, or vomiting. It resolved w/ rest in few minutes.     1. Typical Angina  - Described as chest pressure and SOB which occurred with exertion x 1 month   - ECG NSR with no ischemic characteristics noted  - Trop neg x2  - CT Chest neg for PE  - BNP 1530  - Obtain formal TTE to assess LVEF and r/o WMA  - Start ASA 81mg PO daily, atorvastatin 40mg PO daily  - Plan for cardiac cath Sunday if CAITY resolved.    2. Hypertension  - No formal Hx of HTN  - BP elevated on presentation but now normotensive/soft BP  - Will cont to trend    3. DVT Ppx  - c/w Lovenox 40mg SQ daily    4. Hypothyroidism  - TSH noted  - c/w Synthroid          Differential diagnosis and plan of care discussed with patient after the evaluation. Counseling on diet, nutritional counseling, weight management, exercise and medication compliance was done.   Advanced care planning/advanced directives discussed with patient/family. DNR status including forceful chest compressions to attempt to restart the heart, ventilator support/artificial breathing, electric shock, artificial nutrition, health care proxy, Molst form all discussed with pt. Pt wishes to consider. More than fifteen minutes spent on discussing advanced directives.       Hema Lane DO [facility]  Cardiovascular Medicine  [address], [address]  Available for call or text via [employer]  Office [phone]

## 2023-10-13 NOTE — H&P ADULT - ASSESSMENT
67M PMhx significant for hypothyroidism, asthma, and MM with active tx. Presenting with typical chest pain.

## 2023-10-13 NOTE — H&P ADULT - NSHPADDITIONALINFOADULT_GEN_ALL_CORE
Fluid: po intake   Electrolytes: replete potassium, phosphorus and magnesium to 4/3/2 respectively  Nutrition: dash  Activity: as tolerated     CODE STATUS: FULL CODE   Preferred contact:  Ray Pabon ([facility]) [phone]    Med rec:  Done with pictures of med bottles provided by [facility].

## 2023-10-13 NOTE — H&P ADULT - NSHPPHYSICALEXAM_GEN_ALL_CORE
Vital Signs Last 24 Hrs  T(C): 36.4 (12 Oct 2023 22:27), Max: 36.6 (12 Oct 2023 13:17)  T(F): 97.6 (12 Oct 2023 22:27), Max: 97.8 (12 Oct 2023 13:17)  HR: 82 (12 Oct 2023 22:27) (79 - 86)  BP: 144/90 (12 Oct 2023 22:27) (144/90 - 167/97)  BP(mean): --  RR: 17 (12 Oct 2023 22:27) (16 - 20)  SpO2: 99% (12 Oct 2023 22:27) (98% - 99%)    Parameters below as of 12 Oct 2023 22:27  Patient On (Oxygen Delivery Method): room air        CONSTITUTIONAL: Well-groomed, in no apparent distress  EYES: No conjunctival or scleral injection, non-icteric  ENMT: No external nasal lesions; MMM  RESPIRATORY: Breathing comfortably; lungs CTA without wheeze/rhonchi/rales  CARDIOVASCULAR: +S1S2, RRR, no M/G/R; pedal pulses full and symmetric; trace b/l lower extremity edema  GASTROINTESTINAL: No tenderness, +BS throughout, no rebound/guarding  SKIN: No rashes or ulcers noted  NEUROLOGIC: No gross focal neurological deficits, AAOX3  PSYCHIATRIC: mood and affect appropriate; appropriate insight and judgment

## 2023-10-14 LAB
ANION GAP SERPL CALC-SCNC: 10 MMOL/L — SIGNIFICANT CHANGE UP (ref 5–17)
BUN SERPL-MCNC: 25 MG/DL — HIGH (ref 7–23)
CALCIUM SERPL-MCNC: 8.6 MG/DL — SIGNIFICANT CHANGE UP (ref 8.4–10.5)
CHLORIDE SERPL-SCNC: 104 MMOL/L — SIGNIFICANT CHANGE UP (ref 96–108)
CO2 SERPL-SCNC: 25 MMOL/L — SIGNIFICANT CHANGE UP (ref 22–31)
CREAT SERPL-MCNC: 1.2 MG/DL — SIGNIFICANT CHANGE UP (ref 0.5–1.3)
EGFR: 66 ML/MIN/1.73M2 — SIGNIFICANT CHANGE UP
GLUCOSE SERPL-MCNC: 88 MG/DL — SIGNIFICANT CHANGE UP (ref 70–99)
HCT VFR BLD CALC: 33.1 % — LOW (ref 39–50)
HCV AB S/CO SERPL IA: 0.12 S/CO — SIGNIFICANT CHANGE UP (ref 0–0.99)
HCV AB SERPL-IMP: SIGNIFICANT CHANGE UP
HGB BLD-MCNC: 10.3 G/DL — LOW (ref 13–17)
MCHC RBC-ENTMCNC: 30.7 PG — SIGNIFICANT CHANGE UP (ref 27–34)
MCHC RBC-ENTMCNC: 31.1 GM/DL — LOW (ref 32–36)
MCV RBC AUTO: 98.8 FL — SIGNIFICANT CHANGE UP (ref 80–100)
NRBC # BLD: 1 /100 WBCS — HIGH (ref 0–0)
PLATELET # BLD AUTO: 107 K/UL — LOW (ref 150–400)
POTASSIUM SERPL-MCNC: 4.4 MMOL/L — SIGNIFICANT CHANGE UP (ref 3.5–5.3)
POTASSIUM SERPL-SCNC: 4.4 MMOL/L — SIGNIFICANT CHANGE UP (ref 3.5–5.3)
RBC # BLD: 3.35 M/UL — LOW (ref 4.2–5.8)
RBC # FLD: 15.9 % — HIGH (ref 10.3–14.5)
SODIUM SERPL-SCNC: 139 MMOL/L — SIGNIFICANT CHANGE UP (ref 135–145)
WBC # BLD: 7.42 K/UL — SIGNIFICANT CHANGE UP (ref 3.8–10.5)
WBC # FLD AUTO: 7.42 K/UL — SIGNIFICANT CHANGE UP (ref 3.8–10.5)

## 2023-10-14 PROCEDURE — 99232 SBSQ HOSP IP/OBS MODERATE 35: CPT

## 2023-10-14 RX ORDER — CHLORHEXIDINE GLUCONATE 213 G/1000ML
1 SOLUTION TOPICAL DAILY
Refills: 0 | Status: DISCONTINUED | OUTPATIENT
Start: 2023-10-14 | End: 2023-10-16

## 2023-10-14 RX ORDER — ASPIRIN/CALCIUM CARB/MAGNESIUM 324 MG
81 TABLET ORAL DAILY
Refills: 0 | Status: DISCONTINUED | OUTPATIENT
Start: 2023-10-14 | End: 2023-10-16

## 2023-10-14 RX ADMIN — MONTELUKAST 10 MILLIGRAM(S): 4 TABLET, CHEWABLE ORAL at 13:01

## 2023-10-14 RX ADMIN — CHLORHEXIDINE GLUCONATE 1 APPLICATION(S): 213 SOLUTION TOPICAL at 13:00

## 2023-10-14 RX ADMIN — Medication 400 MILLIGRAM(S): at 13:01

## 2023-10-14 RX ADMIN — LORATADINE 10 MILLIGRAM(S): 10 TABLET ORAL at 13:01

## 2023-10-14 RX ADMIN — Medication 81 MILLIGRAM(S): at 13:19

## 2023-10-14 RX ADMIN — ATORVASTATIN CALCIUM 40 MILLIGRAM(S): 80 TABLET, FILM COATED ORAL at 21:35

## 2023-10-14 RX ADMIN — Medication 50 MICROGRAM(S): at 05:00

## 2023-10-14 RX ADMIN — ENOXAPARIN SODIUM 40 MILLIGRAM(S): 100 INJECTION SUBCUTANEOUS at 05:01

## 2023-10-14 NOTE — PROGRESS NOTE ADULT - SUBJECTIVE AND OBJECTIVE BOX
PROGRESS NOTE:     Patient is a 67y old  Male who presents with a chief complaint of chest pain (13 Oct 2023 17:25)      SUBJECTIVE / OVERNIGHT EVENTS: No acute overnight events. pt is chest pain free at this time, Denies shortness of breath, palpitations, light headedness, or dizziness.     ADDITIONAL REVIEW OF SYSTEMS:    MEDICATIONS  (STANDING):  acyclovir   Oral Tab/Cap 400 milliGRAM(s) Oral daily  aspirin  chewable 81 milliGRAM(s) Oral daily  atorvastatin 40 milliGRAM(s) Oral at bedtime  chlorhexidine 2% Cloths 1 Application(s) Topical daily  enoxaparin Injectable 40 milliGRAM(s) SubCutaneous every 24 hours  levothyroxine 50 MICROGram(s) Oral daily  loratadine 10 milliGRAM(s) Oral daily  montelukast 10 milliGRAM(s) Oral daily  sodium chloride 0.9%. 1000 milliLiter(s) (75 mL/Hr) IV Continuous <Continuous>    MEDICATIONS  (PRN):  acetaminophen     Tablet .. 650 milliGRAM(s) Oral every 6 hours PRN Temp greater or equal to 38C (100.4F), Mild Pain (1 - 3)  aluminum hydroxide/magnesium hydroxide/simethicone Suspension 30 milliLiter(s) Oral every 4 hours PRN Dyspepsia  melatonin 3 milliGRAM(s) Oral at bedtime PRN Insomnia  ondansetron Injectable 4 milliGRAM(s) IV Push every 8 hours PRN Nausea and/or Vomiting      CAPILLARY BLOOD GLUCOSE        I&O's Summary    13 Oct 2023 07:01  -  14 Oct 2023 07:00  --------------------------------------------------------  IN: 900 mL / OUT: 550 mL / NET: 350 mL        PHYSICAL EXAM:  Vital Signs Last 24 Hrs  T(C): 36.3 (14 Oct 2023 04:18), Max: 36.6 (13 Oct 2023 17:16)  T(F): 97.3 (14 Oct 2023 04:18), Max: 97.9 (13 Oct 2023 17:16)  HR: 72 (14 Oct 2023 04:18) (62 - 76)  BP: 119/78 (14 Oct 2023 04:18) (96/63 - 126/83)  BP(mean): --  RR: 18 (14 Oct 2023 04:18) (18 - 18)  SpO2: 99% (14 Oct 2023 04:18) (94% - 99%)    Parameters below as of 14 Oct 2023 04:18  Patient On (Oxygen Delivery Method): room air        CONSTITUTIONAL:   CONSTITUTIONAL: Well-groomed, in no apparent distress  EYES: No conjunctival or scleral injection, non-icteric  ENMT: No external nasal lesions; MMM  RESPIRATORY: Breathing comfortably; lungs CTA without wheeze/rhonchi/rales  CARDIOVASCULAR: +S1S2, RRR, no M/G/R; pedal pulses full and symmetric; trace b/l lower extremity edema  GASTROINTESTINAL: No tenderness, +BS throughout, no rebound/guarding  SKIN: No rashes or ulcers noted  NEUROLOGIC: No gross focal neurological deficits, AAOX3  PSYCHIATRIC: mood and affect appropriate; appropriate insight and judgment  LABS:                        10.3   7.42  )-----------( 107      ( 14 Oct 2023 06:40 )             33.1     10-14    139  |  104  |  25<H>  ----------------------------<  88  4.4   |  25  |  1.20    Ca    8.6      14 Oct 2023 06:42  Phos  3.4     10-13  Mg     2.3     10-12    TPro  7.6  /  Alb  4.1  /  TBili  0.2  /  DBili  x   /  AST  30  /  ALT  25  /  AlkPhos  52  10-12          Urinalysis Basic - ( 14 Oct 2023 06:42 )    Color: x / Appearance: x / SG: x / pH: x  Gluc: 88 mg/dL / Ketone: x  / Bili: x / Urobili: x   Blood: x / Protein: x / Nitrite: x   Leuk Esterase: x / RBC: x / WBC x   Sq Epi: x / Non Sq Epi: x / Bacteria: x          RADIOLOGY & ADDITIONAL TESTS:  Results Reviewed:   Imaging Personally Reviewed:  Electrocardiogram Personally Reviewed:    COORDINATION OF CARE:  Care Discussed with Consultants/Other Providers [Y/N]:  Prior or Outpatient Records Reviewed [Y/N]:

## 2023-10-14 NOTE — PROGRESS NOTE ADULT - SUBJECTIVE AND OBJECTIVE BOX
DATE OF SERVICE: 10-14-23 @ 12:45    Patient is a 67y old  Male who presents with a chief complaint of chest pain (14 Oct 2023 11:59)      INTERVAL HISTORY: Feels ok. Son at bedside providing Julisa translation. Declines formal translation services.     REVIEW OF SYSTEMS:  CONSTITUTIONAL: No weakness  EYES/ENT: No visual changes;  No throat pain   NECK: No pain or stiffness  RESPIRATORY: No cough, wheezing; No shortness of breath  CARDIOVASCULAR: No chest pain or palpitations  GASTROINTESTINAL: No abdominal  pain. No nausea, vomiting, or hematemesis  GENITOURINARY: No dysuria, frequency or hematuria  NEUROLOGICAL: No stroke like symptoms  SKIN: No rashes    TELEMETRY Personally reviewed: SR   	  MEDICATIONS:        PHYSICAL EXAM:  T(C): 36.3 (10-14-23 @ 04:18), Max: 36.6 (10-13-23 @ 17:16)  HR: 72 (10-14-23 @ 04:18) (62 - 76)  BP: 119/78 (10-14-23 @ 04:18) (96/63 - 126/83)  RR: 18 (10-14-23 @ 04:18) (18 - 18)  SpO2: 99% (10-14-23 @ 04:18) (94% - 99%)  Wt(kg): --  I&O's Summary    13 Oct 2023 07:01  -  14 Oct 2023 07:00  --------------------------------------------------------  IN: 900 mL / OUT: 550 mL / NET: 350 mL          Appearance: In no distress	  HEENT:    PERRL, EOMI	  Cardiovascular:  S1 S2, No JVD  Respiratory: Lungs clear to auscultation	  Gastrointestinal:  Soft, Non-tender, + BS	  Vascularature:  No edema of LE  Psychiatric: Appropriate affect   Neuro: no acute focal deficits                               10.3   7.42  )-----------( 107      ( 14 Oct 2023 06:40 )             33.1     10-14    139  |  104  |  25<H>  ----------------------------<  88  4.4   |  25  |  1.20    Ca    8.6      14 Oct 2023 06:42  Phos  3.4     10-13  Mg     2.3     10-12    TPro  7.6  /  Alb  4.1  /  TBili  0.2  /  DBili  x   /  AST  30  /  ALT  25  /  AlkPhos  52  10-12        Labs personally reviewed      ASSESSMENT/PLAN: 	    67M PMhx significant for  hypothyroidism and asthma, MM, was on different chemo previously but recently started on Kyprolis and Darzalex.  Pt reports 3-4 weeks of fatigue but attributed to chemo. Then, on 10/12, pt had severe chest pressure (indicates whole anterior chest) with shortness of breath while ambulating on flat surface; denies diaphoresis, radiation of the pain, palpitation nausea, or vomiting. It resolved w/ rest in few minutes.     1. Typical Angina  - Described as chest pressure and SOB which occurred with exertion x 1 month   - ECG NSR with no ischemic characteristics noted  - Trop neg x2  - CT Chest neg for PE  - BNP 1530  - TTE with reduced EF of 47%, + WMA, mild PH  - Start ASA 81mg PO daily, atorvastatin 40mg PO daily  - Plan for cardiac cath Sunday. R/B discussed with son and patient. Verb understanding and in agreement.     2. Hypertension  - No formal Hx of HTN  - BP elevated on presentation but now normotensive/soft BP  - Will cont to trend    3. DVT Ppx  - c/w Lovenox 40mg SQ daily    4. Hypothyroidism  - TSH noted  - c/w Synthroid    Xochilt Aceves, AG-NP   Hema Lane DO [facility]  Cardiovascular Medicine  [address] [place] [address], [address]  Available through call or text on [employer] TE[employer]  Office: [phone]

## 2023-10-15 DIAGNOSIS — I42.9 CARDIOMYOPATHY, UNSPECIFIED: ICD-10-CM

## 2023-10-15 LAB
ANION GAP SERPL CALC-SCNC: 12 MMOL/L — SIGNIFICANT CHANGE UP (ref 5–17)
APTT BLD: 25.8 SEC — SIGNIFICANT CHANGE UP (ref 24.5–35.6)
BLD GP AB SCN SERPL QL: POSITIVE — SIGNIFICANT CHANGE UP
BUN SERPL-MCNC: 22 MG/DL — SIGNIFICANT CHANGE UP (ref 7–23)
CALCIUM SERPL-MCNC: 8.7 MG/DL — SIGNIFICANT CHANGE UP (ref 8.4–10.5)
CHLORIDE SERPL-SCNC: 104 MMOL/L — SIGNIFICANT CHANGE UP (ref 96–108)
CO2 SERPL-SCNC: 24 MMOL/L — SIGNIFICANT CHANGE UP (ref 22–31)
CREAT SERPL-MCNC: 1.11 MG/DL — SIGNIFICANT CHANGE UP (ref 0.5–1.3)
EGFR: 73 ML/MIN/1.73M2 — SIGNIFICANT CHANGE UP
GLUCOSE SERPL-MCNC: 94 MG/DL — SIGNIFICANT CHANGE UP (ref 70–99)
HCT VFR BLD CALC: 33.1 % — LOW (ref 39–50)
HGB BLD-MCNC: 10.2 G/DL — LOW (ref 13–17)
INR BLD: 1.03 RATIO — SIGNIFICANT CHANGE UP (ref 0.85–1.18)
MAGNESIUM SERPL-MCNC: 2.3 MG/DL — SIGNIFICANT CHANGE UP (ref 1.6–2.6)
MCHC RBC-ENTMCNC: 30.7 PG — SIGNIFICANT CHANGE UP (ref 27–34)
MCHC RBC-ENTMCNC: 30.8 GM/DL — LOW (ref 32–36)
MCV RBC AUTO: 99.7 FL — SIGNIFICANT CHANGE UP (ref 80–100)
NRBC # BLD: 1 /100 WBCS — HIGH (ref 0–0)
PLATELET # BLD AUTO: 99 K/UL — LOW (ref 150–400)
POTASSIUM SERPL-MCNC: 4.4 MMOL/L — SIGNIFICANT CHANGE UP (ref 3.5–5.3)
POTASSIUM SERPL-SCNC: 4.4 MMOL/L — SIGNIFICANT CHANGE UP (ref 3.5–5.3)
PROTHROM AB SERPL-ACNC: 10.8 SEC — SIGNIFICANT CHANGE UP (ref 9.5–13)
RBC # BLD: 3.32 M/UL — LOW (ref 4.2–5.8)
RBC # FLD: 15.8 % — HIGH (ref 10.3–14.5)
RH IG SCN BLD-IMP: POSITIVE — SIGNIFICANT CHANGE UP
SODIUM SERPL-SCNC: 140 MMOL/L — SIGNIFICANT CHANGE UP (ref 135–145)
WBC # BLD: 6.34 K/UL — SIGNIFICANT CHANGE UP (ref 3.8–10.5)
WBC # FLD AUTO: 6.34 K/UL — SIGNIFICANT CHANGE UP (ref 3.8–10.5)

## 2023-10-15 PROCEDURE — 99232 SBSQ HOSP IP/OBS MODERATE 35: CPT

## 2023-10-15 PROCEDURE — 86077 PHYS BLOOD BANK SERV XMATCH: CPT

## 2023-10-15 PROCEDURE — 93454 CORONARY ARTERY ANGIO S&I: CPT | Mod: 26

## 2023-10-15 RX ORDER — VALSARTAN 80 MG/1
40 TABLET ORAL DAILY
Refills: 0 | Status: DISCONTINUED | OUTPATIENT
Start: 2023-10-16 | End: 2023-10-16

## 2023-10-15 RX ORDER — VALSARTAN 80 MG/1
20 TABLET ORAL DAILY
Refills: 0 | Status: DISCONTINUED | OUTPATIENT
Start: 2023-10-15 | End: 2023-10-15

## 2023-10-15 RX ORDER — METOPROLOL TARTRATE 50 MG
25 TABLET ORAL DAILY
Refills: 0 | Status: DISCONTINUED | OUTPATIENT
Start: 2023-10-15 | End: 2023-10-16

## 2023-10-15 RX ORDER — DAPAGLIFLOZIN 10 MG/1
10 TABLET, FILM COATED ORAL EVERY 24 HOURS
Refills: 0 | Status: DISCONTINUED | OUTPATIENT
Start: 2023-10-15 | End: 2023-10-16

## 2023-10-15 RX ORDER — DAPAGLIFLOZIN 5 MG/1
1 TABLET, FILM COATED ORAL
Qty: 30 | Refills: 0 | DISCHARGE
Start: 2023-10-15 | End: 2023-11-13

## 2023-10-15 RX ORDER — DAPAGLIFLOZIN 10 MG/1
1 TABLET, FILM COATED ORAL
Qty: 30 | Refills: 0
Start: 2023-10-15 | End: 2023-11-13

## 2023-10-15 RX ORDER — METOPROLOL TARTRATE 50 MG
1 TABLET ORAL
Qty: 30 | Refills: 0
Start: 2023-10-15 | End: 2023-11-13

## 2023-10-15 RX ORDER — VALSARTAN 80 MG/1
0.5 TABLET ORAL
Qty: 15 | Refills: 0
Start: 2023-10-15 | End: 2023-11-13

## 2023-10-15 RX ADMIN — CHLORHEXIDINE GLUCONATE 1 APPLICATION(S): 213 SOLUTION TOPICAL at 11:28

## 2023-10-15 RX ADMIN — Medication 400 MILLIGRAM(S): at 13:09

## 2023-10-15 RX ADMIN — ATORVASTATIN CALCIUM 40 MILLIGRAM(S): 80 TABLET, FILM COATED ORAL at 21:42

## 2023-10-15 RX ADMIN — VALSARTAN 20 MILLIGRAM(S): 80 TABLET ORAL at 13:10

## 2023-10-15 RX ADMIN — MONTELUKAST 10 MILLIGRAM(S): 4 TABLET, CHEWABLE ORAL at 13:10

## 2023-10-15 RX ADMIN — DAPAGLIFLOZIN 10 MILLIGRAM(S): 10 TABLET, FILM COATED ORAL at 13:10

## 2023-10-15 RX ADMIN — Medication 81 MILLIGRAM(S): at 13:09

## 2023-10-15 RX ADMIN — Medication 25 MILLIGRAM(S): at 13:10

## 2023-10-15 RX ADMIN — Medication 50 MICROGRAM(S): at 05:12

## 2023-10-15 RX ADMIN — LORATADINE 10 MILLIGRAM(S): 10 TABLET ORAL at 13:09

## 2023-10-15 NOTE — DISCHARGE NOTE PROVIDER - NSDCFUADDAPPT_GEN_ALL_CORE_FT
APPTS ARE READY TO BE MADE: [X] YES    Best Family or Patient Contact (if needed):    Additional Information about above appointments (if needed):    1: Follow up with PCP Dr. Pitt within 1 week of DC   2: Follow up with Cardiology Dr. Lane outpatient within 1 week   3:Follow up with Oncology Dr. Block outpatient     Other comments or requests:    APPTS ARE READY TO BE MADE: [X] YES    Best Family or Patient Contact (if needed):    Additional Information about above appointments (if needed):    1: Follow up with PCP Dr. Pitt within 1 week of DC   2: Follow up with Cardiology Dr. Lane outpatient within 1 week   3:Follow up with Oncology Dr. Block outpatient     Other comments or requests:   Patient's son in law was provided with follow up details, however he only requested assistance with scheduling an appointment for Dr. Pitt. The provider's office will be opened on 10/18 and patient will be outreached with an appointment, no assistance is needed for Dr. Lane and Dr. Block at this time.  APPTS ARE READY TO BE MADE: [X] YES    Best Family or Patient Contact (if needed):    Additional Information about above appointments (if needed):    1: Follow up with PCP Dr. Pitt within 1 week of DC   2: Follow up with Cardiology Dr. Lane outpatient within 1 week   3:Follow up with Oncology Dr. Block outpatient     Other comments or requests:   Patient's son in law was provided with follow up details, however he only requested assistance with scheduling an appointment for Dr. Pitt. The provider's office will be opened on 10/18 and patient will be outreached with an appointment, no assistance is needed for Dr. Lane and Dr. Block at this time.   Patient is scheduled on 10/20 at 11:45am at [address] [place] [address] with Dr. Loida Pitt

## 2023-10-15 NOTE — DISCHARGE NOTE PROVIDER - CARE PROVIDER_API CALL
SHANNA LEYVA  [address] [facility], [address] [address]  Phone: [phone]  Fax: [phone]  Follow Up Time:     Hema Lane  Cardiovascular Disease  [address] [place] [address], [address]  [place] [address]  Phone: [phone]  Fax: [phone]  Follow Up Time:     Agustin Block  [address] [place] [address]  Phone: [phone]  Fax: [phone]  Follow Up Time:

## 2023-10-15 NOTE — DISCHARGE NOTE PROVIDER - PROVIDER TOKENS
PROVIDER:[TOKEN:[[id number]]],PROVIDER:[TOKEN:[[id number]]],PROVIDER:[TOKEN:[[id number]]]

## 2023-10-15 NOTE — DISCHARGE NOTE PROVIDER - NSDCCPCAREPLAN_GEN_ALL_CORE_FT
PRINCIPAL DISCHARGE DIAGNOSIS  Diagnosis: Chest pain  Assessment and Plan of Treatment: Take medications as prescribed  Follow up with cardiology outpatient   HOME CARE INSTRUCTIONS  For the next few days, avoid physical activities that bring on chest pain. Continue physical activities as directed.  Do not smoke.  Avoid drinking alcohol.   Only take over-the-counter or prescription medicine for pain, discomfort, or fever as directed by your caregiver.  Follow your caregiver's suggestions for further testing if your chest pain does not go away.  Keep any follow-up appointments you made. If you do not go to an appointment, you could develop lasting (chronic) problems with pain. If there is any problem keeping an appointment, you must call to reschedule.   SEEK MEDICAL CARE IF:  You think you are having problems from the medicine you are taking. Read your medicine instructions carefully.  Your chest pain does not go away, even after treatment.  You develop a rash with blisters on your chest.  SEEK IMMEDIATE MEDICAL CARE IF:  You have increased chest pain or pain that spreads to your arm, neck, jaw, back, or abdomen.   You develop shortness of breath, an increasing cough, or you are coughing up blood.  You have severe back or abdominal pain, feel nauseous, or vomit.  You develop severe weakness, fainting, or chills.  You have a fever.  THIS IS AN EMERGENCY. Do not wait to see if the pain will go away. Get medical help at once. Call your local emergency services (_____________________). Do not drive yourself to the hospital.        SECONDARY DISCHARGE DIAGNOSES  Diagnosis: Multiple myeloma  Assessment and Plan of Treatment: Follow up with heme/onc Dr. Block outpatient

## 2023-10-15 NOTE — PROGRESS NOTE ADULT - SUBJECTIVE AND OBJECTIVE BOX
DATE OF SERVICE: 10-15-23 @ 10:44    Patient is a 67y old  Male who presents with a chief complaint of chest pain (14 Oct 2023 12:45)      INTERVAL HISTORY: Feels ok.     REVIEW OF SYSTEMS:  CONSTITUTIONAL: No weakness  EYES/ENT: No visual changes;  No throat pain   NECK: No pain or stiffness  RESPIRATORY: No cough, wheezing; No shortness of breath  CARDIOVASCULAR: No chest pain or palpitations  GASTROINTESTINAL: No abdominal  pain. No nausea, vomiting, or hematemesis  GENITOURINARY: No dysuria, frequency or hematuria  NEUROLOGICAL: No stroke like symptoms  SKIN: No rashes    TELEMETRY Personally reviewed: SR 60-70  	  MEDICATIONS:        PHYSICAL EXAM:  T(C): 36.8 (10-15-23 @ 09:30), Max: 36.8 (10-15-23 @ 09:30)  HR: 84 (10-15-23 @ 09:30) (60 - 84)  BP: 127/89 (10-15-23 @ 09:30) (117/79 - 174/93)  RR: 18 (10-15-23 @ 09:30) (16 - 18)  SpO2: 98% (10-15-23 @ 09:30) (93% - 100%)  Wt(kg): --  I&O's Summary    14 Oct 2023 07:01  -  15 Oct 2023 07:00  --------------------------------------------------------  IN: 0 mL / OUT: 300 mL / NET: -300 mL          Appearance: In no distress	  HEENT:    PERRL, EOMI	  Cardiovascular:  S1 S2, No JVD  Respiratory: Lungs clear to auscultation	  Gastrointestinal:  Soft, Non-tender, + BS	  Vascularature:  No edema of LE  Psychiatric: Appropriate affect   Neuro: no acute focal deficits                               10.2   6.34  )-----------( 99       ( 15 Oct 2023 06:24 )             33.1     10-15    140  |  104  |  22  ----------------------------<  94  4.4   |  24  |  1.11    Ca    8.7      15 Oct 2023 06:23  Mg     2.3     10-15          Labs personally reviewed      ASSESSMENT/PLAN: 	    67M PMhx significant for  hypothyroidism and asthma, MM, was on different chemo previously but recently started on Kyprolis and Darzalex.  Pt reports 3-4 weeks of fatigue but attributed to chemo. Then, on 10/12, pt had severe chest pressure (indicates whole anterior chest) with shortness of breath while ambulating on flat surface; denies diaphoresis, radiation of the pain, palpitation nausea, or vomiting. It resolved w/ rest in few minutes.     1. Typical Angina  - Described as chest pressure and SOB which occurred with exertion x 1 month   - ECG NSR with no ischemic characteristics noted  - Trop neg x2  - CT Chest neg for PE  - BNP 1530  - TTE with reduced EF of 47%, + WMA, mild PH  - Continue ASA 81mg PO daily, atorvastatin 40mg PO daily  - S/p diagnostic cath with non-obstructive CAD    2. Hypertension  - No formal Hx of HTN  - BP elevated on presentation but now normotensive/soft BP  - Will cont to trend    3. DVT Ppx  - c/w Lovenox 40mg SQ daily    4. Hypothyroidism  - TSH noted  - c/w Synthroid    5. Heart Failure with Preserved Ejection Fraction  - Patient now with diagnostic cath therefore not ischemic cardiomyopathy. Likely 2/2 chemo treatments.  - Will initiate sHF GDMT, Will need close OP follow up with further medication titration.  - Start Toprol 25mg PO daily, Valsartan 20mg PO daily, Farxiga 10mg PO daily       NELE Conteh-NP   Hema Lane DO [facility]  Cardiovascular Medicine  [address] [place] [address], [address]  Available through call or text on [employer] TE[employer]  Office: [phone]   DATE OF SERVICE: 10-15-23 @ 10:44    Patient is a 67y old  Male who presents with a chief complaint of chest pain (14 Oct 2023 12:45)      INTERVAL HISTORY: Feels ok.     REVIEW OF SYSTEMS:  CONSTITUTIONAL: No weakness  EYES/ENT: No visual changes;  No throat pain   NECK: No pain or stiffness  RESPIRATORY: No cough, wheezing; No shortness of breath  CARDIOVASCULAR: No chest pain or palpitations  GASTROINTESTINAL: No abdominal  pain. No nausea, vomiting, or hematemesis  GENITOURINARY: No dysuria, frequency or hematuria  NEUROLOGICAL: No stroke like symptoms  SKIN: No rashes    TELEMETRY Personally reviewed: SR 60-70  	  MEDICATIONS:        PHYSICAL EXAM:  T(C): 36.8 (10-15-23 @ 09:30), Max: 36.8 (10-15-23 @ 09:30)  HR: 84 (10-15-23 @ 09:30) (60 - 84)  BP: 127/89 (10-15-23 @ 09:30) (117/79 - 174/93)  RR: 18 (10-15-23 @ 09:30) (16 - 18)  SpO2: 98% (10-15-23 @ 09:30) (93% - 100%)  Wt(kg): --  I&O's Summary    14 Oct 2023 07:01  -  15 Oct 2023 07:00  --------------------------------------------------------  IN: 0 mL / OUT: 300 mL / NET: -300 mL          Appearance: In no distress	  HEENT:    PERRL, EOMI	  Cardiovascular:  S1 S2, No JVD  Respiratory: Lungs clear to auscultation	  Gastrointestinal:  Soft, Non-tender, + BS	  Vascularature:  No edema of LE  Psychiatric: Appropriate affect   Neuro: no acute focal deficits                               10.2   6.34  )-----------( 99       ( 15 Oct 2023 06:24 )             33.1     10-15    140  |  104  |  22  ----------------------------<  94  4.4   |  24  |  1.11    Ca    8.7      15 Oct 2023 06:23  Mg     2.3     10-15          Labs personally reviewed      ASSESSMENT/PLAN: 	    67M PMhx significant for  hypothyroidism and asthma, MM, was on different chemo previously but recently started on Kyprolis and Darzalex.  Pt reports 3-4 weeks of fatigue but attributed to chemo. Then, on 10/12, pt had severe chest pressure (indicates whole anterior chest) with shortness of breath while ambulating on flat surface; denies diaphoresis, radiation of the pain, palpitation nausea, or vomiting. It resolved w/ rest in few minutes.     1. Typical Angina  - Described as chest pressure and SOB which occurred with exertion x 1 month   - ECG NSR with no ischemic characteristics noted  - Trop neg x2  - CT Chest neg for PE  - BNP 1530  - TTE with reduced EF of 47%, + WMA, mild PH  - Continue ASA 81mg PO daily, atorvastatin 40mg PO daily  - S/p diagnostic cath with non-obstructive CAD    2. Hypertension  - No formal Hx of HTN  - BP elevated on presentation but now normotensive/soft BP  - Will cont to trend    3. DVT Ppx  - c/w Lovenox 40mg SQ daily    4. Hypothyroidism  - TSH noted  - c/w Synthroid    5. Heart Failure with Reduced Ejection Fraction  - Patient now with diagnostic cath therefore non ischemic cardiomyopathy. Likely 2/2 chemo treatments.  - Will initiate sHF GDMT, Will need close OP follow up with further medication titration.  - Start Toprol 25mg PO daily, Valsartan 40mg PO daily, Farxiga 10mg PO daily       NEEL Conteh-NP   Hema Lane DO [facility]  Cardiovascular Medicine  [address] [place] [address], [address]  Available through call or text on [employer] TE[employer]  Office: [phone]

## 2023-10-15 NOTE — DISCHARGE NOTE PROVIDER - HOSPITAL COURSE
HPI:  67M PMhx significant for  hypothyroidism and asthma, MM, was on different chemo previously but recently started on Kyprolis and Darzalex. Pt reports 3-4 weeks of fatigue but attributed to chemo. Then, on 10/12, pt had severe chest pressure (indicates whole anterior chest) with shortness of breath while ambulating on flat surface; denies diaphoresis, radiation of the pain, palpitation nausea, or vomiting. It resolved w/ rest in few minutes. On presentation to ED, mildly hypertensive, HR 85, afebrile, and on room air. Trop neg x2,  and EKG w/o ischemic changes. CTA Chest neg for PE, no cardiomegaly or pericardial effusion, no pleural effusion, b/l base bronchial thickening. CBC w/ leukocytosis and anemia (unknown BL), thrombocytopenia (unknown baseline). CMP unremarkable, and pro-BNP 1530 (unknown baseline). Lactic acid level 2.6. UA unremarkable.  Pt received full dose asa in the ED.     Hospital Course:  Pt presented with chespt pain, compensated on exam. Troponins 11 --> 11, pro-BNP 1530. TTE taken which showed- LVEF= 47% with WMA. Normal right ventricular cavity size and systolic function. mild MR. mild pulmonary hypertension. mild to mod TR. Pt now status post [facility] 10/15 with normal coronary anatomy. Given cath findings, therefore not ischemic cardiomyopathy, likely secondary to chemo treatments. Initiated GDMT- started on metoprolol succinate, valsartan, and farxiga. Continue as prescribed. Pt to follow up with cardiology as outpatient. Pt also with Multiple myeloma, sees Dr. Block at [facility]. Pt to follow up with heme/onc outpatient.     Important Medication Changes and Reason:  -Started on metoprolol succinate given HF   -Started on Valsartan given HF   -Started on Farxiga given HF     Active or Pending Issues Requiring Follow-up:  -Follow up with PCP  -Close follow up with cardiology for new HF and medication management/titration  -Follow up with hematologist given MM     Advanced Directives:   [X] Full code  [ ] DNR  [ ] Hospice    Discharge Diagnoses:  -Heart Failure with Preserved Ejection Fraction        Discharge/Dispo/Med rec discussed with attending  ____. Patient medically cleared for discharge Home with outpatient follow up with PCP, cardiology, and hematology HPI:  67M PMhx significant for  hypothyroidism and asthma, MM, was on different chemo previously but recently started on Kyprolis and Darzalex. Pt reports 3-4 weeks of fatigue but attributed to chemo. Then, on 10/12, pt had severe chest pressure (indicates whole anterior chest) with shortness of breath while ambulating on flat surface; denies diaphoresis, radiation of the pain, palpitation nausea, or vomiting. It resolved w/ rest in few minutes. On presentation to ED, mildly hypertensive, HR 85, afebrile, and on room air. Trop neg x2,  and EKG w/o ischemic changes. CTA Chest neg for PE, no cardiomegaly or pericardial effusion, no pleural effusion, b/l base bronchial thickening. CBC w/ leukocytosis and anemia (unknown BL), thrombocytopenia (unknown baseline). CMP unremarkable, and pro-BNP 1530 (unknown baseline). Lactic acid level 2.6. UA unremarkable.  Pt received full dose asa in the ED.     Hospital Course:  Pt presented with chespt pain, compensated on exam. Troponins 11 --> 11, pro-BNP 1530. TTE taken which showed- LVEF= 47% with WMA. Normal right ventricular cavity size and systolic function. mild MR. mild pulmonary hypertension. mild to mod TR. Pt now status post [facility] 10/15 with normal coronary anatomy. Given cath findings, therefore not ischemic cardiomyopathy, likely secondary to chemo treatments. Initiated GDMT- started on metoprolol succinate, valsartan, and farxiga. Continue as prescribed. Pt to follow up with cardiology as outpatient. Pt also with Multiple myeloma, sees Dr. Block at [facility]. Pt to follow up with heme/onc outpatient.     Important Medication Changes and Reason:  -Started on metoprolol succinate given HF   -Started on Valsartan given HF   -Started on Farxiga given HF     Active or Pending Issues Requiring Follow-up:  -Follow up with PCP  -Close follow up with cardiology for new HF and medication management/titration  -Follow up with hematologist given MM     Advanced Directives:   [X] Full code  [ ] DNR  [ ] Hospice    Discharge Diagnoses:  -Heart Failure with Preserved Ejection Fraction        Discharge/Dispo/Med rec discussed with attending .  Patient medically cleared for discharge Home with outpatient follow up with PCP, cardiology, and hematology

## 2023-10-15 NOTE — DISCHARGE NOTE PROVIDER - NSDCFUADDINST_GEN_ALL_CORE_FT
----- Message from Lainey Enrique LPN sent at 10/13/2017 11:09 AM CDT -----  Contact: Father Zoltan       ----- Message -----  From: Mauricio Villarreal  Sent: 10/13/2017  10:56 AM  To: Ella ÁLVAREZ Staff    Father Zoltan want to speak with a nurse regarding a injection appointment. Please call back at [phone]      Follow up with PCP, cardiology, and heme/onc outpatient    [[id number]]

## 2023-10-15 NOTE — PROGRESS NOTE ADULT - SUBJECTIVE AND OBJECTIVE BOX
PROGRESS NOTE:     Patient is a 67y old  Male who presents with a chief complaint of chest pain (15 Oct 2023 10:44)      SUBJECTIVE / OVERNIGHT EVENTS: No acute overnight events. No chest pain, palpitations, shorntess of breath, orthopnea, PND or VELAZCO    ADDITIONAL REVIEW OF SYSTEMS:    MEDICATIONS  (STANDING):  acyclovir   Oral Tab/Cap 400 milliGRAM(s) Oral daily  aspirin enteric coated 81 milliGRAM(s) Oral daily  atorvastatin 40 milliGRAM(s) Oral at bedtime  chlorhexidine 2% Cloths 1 Application(s) Topical daily  dapagliflozin 10 milliGRAM(s) Oral every 24 hours  levothyroxine 50 MICROGram(s) Oral daily  loratadine 10 milliGRAM(s) Oral daily  metoprolol succinate ER 25 milliGRAM(s) Oral daily  montelukast 10 milliGRAM(s) Oral daily  sodium chloride 0.9%. 1000 milliLiter(s) (75 mL/Hr) IV Continuous <Continuous>  valsartan 20 milliGRAM(s) Oral daily    MEDICATIONS  (PRN):  acetaminophen     Tablet .. 650 milliGRAM(s) Oral every 6 hours PRN Temp greater or equal to 38C (100.4F), Mild Pain (1 - 3)  aluminum hydroxide/magnesium hydroxide/simethicone Suspension 30 milliLiter(s) Oral every 4 hours PRN Dyspepsia  melatonin 3 milliGRAM(s) Oral at bedtime PRN Insomnia  ondansetron Injectable 4 milliGRAM(s) IV Push every 8 hours PRN Nausea and/or Vomiting      CAPILLARY BLOOD GLUCOSE        I&O's Summary    14 Oct 2023 07:01  -  15 Oct 2023 07:00  --------------------------------------------------------  IN: 0 mL / OUT: 300 mL / NET: -300 mL        PHYSICAL EXAM:  Vital Signs Last 24 Hrs  T(C): 36.8 (15 Oct 2023 09:30), Max: 36.8 (15 Oct 2023 09:30)  T(F): 98.2 (15 Oct 2023 09:30), Max: 98.2 (15 Oct 2023 09:30)  HR: 84 (15 Oct 2023 09:30) (60 - 84)  BP: 127/89 (15 Oct 2023 09:30) (117/79 - 174/93)  BP(mean): --  RR: 18 (15 Oct 2023 09:30) (16 - 18)  SpO2: 98% (15 Oct 2023 09:30) (93% - 100%)    Parameters below as of 15 Oct 2023 09:30  Patient On (Oxygen Delivery Method): room air        CONSTITUTIONAL: Well-groomed, in no apparent distress  EYES: No conjunctival or scleral injection, non-icteric  ENMT: No external nasal lesions; MMM  RESPIRATORY: Breathing comfortably; lungs CTA without wheeze/rhonchi/rales  CARDIOVASCULAR: +S1S2, RRR, no M/G/R; pedal pulses full and symmetric; trace b/l lower extremity edema  GASTROINTESTINAL: No tenderness, +BS throughout, no rebound/guarding  SKIN: No rashes or ulcers noted  NEUROLOGIC: No gross focal neurological deficits, AAOX3  PSYCHIATRIC: mood and affect appropriate; appropriate insight and judgment      LABS:                        10.2   6.34  )-----------( 99       ( 15 Oct 2023 06:24 )             33.1     10-15    140  |  104  |  22  ----------------------------<  94  4.4   |  24  |  1.11    Ca    8.7      15 Oct 2023 06:23  Mg     2.3     10-15      PT/INR - ( 15 Oct 2023 06:24 )   PT: 10.8 sec;   INR: 1.03 ratio         PTT - ( 15 Oct 2023 06:24 )  PTT:25.8 sec      Urinalysis Basic - ( 15 Oct 2023 06:23 )    Color: x / Appearance: x / SG: x / pH: x  Gluc: 94 mg/dL / Ketone: x  / Bili: x / Urobili: x   Blood: x / Protein: x / Nitrite: x   Leuk Esterase: x / RBC: x / WBC x   Sq Epi: x / Non Sq Epi: x / Bacteria: x          RADIOLOGY & ADDITIONAL TESTS:  Results Reviewed:   Imaging Personally Reviewed:  Electrocardiogram Personally Reviewed:    COORDINATION OF CARE:  Care Discussed with Consultants/Other Providers [Y/N]:  Prior or Outpatient Records Reviewed [Y/N]:

## 2023-10-15 NOTE — CHART NOTE - NSCHARTNOTEFT_GEN_A_CORE
Pt s/p cardiac cath via Right radial artery  Pt seen and examined at bedside, resting, NAD.  R wrist radial artery access site examined.   Skin around site warm, soft, non-tender, no hematoma noted, dressing dry and intact.  Right radial pulse palpaple.    >Vital signs stable  >Will continue to closely assess and monitor overnight        Dwayne Sanchez PA-C
Pt seen and examined with family at bedside. Julisa speaking ; translation by son-in-law Gary per pt request.  He denies CP at rest or on exertion. Also denies sob, palpitations, dizziness.  On exam, VSS, AAOX3, s1s2, no mrg, abd is soft, nt, nd, bs+, no LE edema  Labs reviewed ; hs trop neg x 2, no acute ischemic changes on EKG ; TTE pending  Doubt ACS  May benefit from stress test ; will defer to Cardiology    d/w ACP Lois

## 2023-10-15 NOTE — DISCHARGE NOTE PROVIDER - NSDCMRMEDTOKEN_GEN_ALL_CORE_FT
acyclovir 400 mg oral tablet: 1 tab(s) orally once a day  dapagliflozin 10 mg oral tablet: 1 tab(s) orally every 24 hours  levothyroxine 50 mcg (0.05 mg) oral tablet: 1 tab(s) orally once a day  loratadine 10 mg oral tablet: 1 tab(s) orally once a day  metoprolol succinate 25 mg oral tablet, extended release: 1 tab(s) orally once a day  montelukast 10 mg oral tablet: 1 tab(s) orally once a day  oxyCODONE 5 mg oral tablet: 1 tab(s) orally every 4 hours as needed for  severe pain  valsartan 40 mg oral tablet: 0.5 tab(s) orally once a day   acyclovir 400 mg oral tablet: 1 tab(s) orally once a day  aspirin 81 mg oral delayed release tablet: 1 tab(s) orally once a day  atorvastatin 40 mg oral tablet: 1 tab(s) orally once a day (at bedtime)  dapagliflozin 10 mg oral tablet: 1 tab(s) orally every 24 hours  levothyroxine 50 mcg (0.05 mg) oral tablet: 1 tab(s) orally once a day  loratadine 10 mg oral tablet: 1 tab(s) orally once a day  metoprolol succinate 25 mg oral tablet, extended release: 1 tab(s) orally once a day  montelukast 10 mg oral tablet: 1 tab(s) orally once a day  oxyCODONE 5 mg oral tablet: 1 tab(s) orally every 4 hours as needed for  severe pain  valsartan 40 mg oral tablet: 1 tab(s) orally once a day

## 2023-10-16 VITALS
DIASTOLIC BLOOD PRESSURE: 68 MMHG | OXYGEN SATURATION: 97 % | RESPIRATION RATE: 18 BRPM | SYSTOLIC BLOOD PRESSURE: 105 MMHG | TEMPERATURE: 98 F | HEART RATE: 85 BPM

## 2023-10-16 LAB
ANION GAP SERPL CALC-SCNC: 11 MMOL/L — SIGNIFICANT CHANGE UP (ref 5–17)
BUN SERPL-MCNC: 24 MG/DL — HIGH (ref 7–23)
CALCIUM SERPL-MCNC: 9.5 MG/DL — SIGNIFICANT CHANGE UP (ref 8.4–10.5)
CHLORIDE SERPL-SCNC: 101 MMOL/L — SIGNIFICANT CHANGE UP (ref 96–108)
CO2 SERPL-SCNC: 25 MMOL/L — SIGNIFICANT CHANGE UP (ref 22–31)
CREAT SERPL-MCNC: 1.24 MG/DL — SIGNIFICANT CHANGE UP (ref 0.5–1.3)
EGFR: 64 ML/MIN/1.73M2 — SIGNIFICANT CHANGE UP
GLUCOSE SERPL-MCNC: 106 MG/DL — HIGH (ref 70–99)
HCT VFR BLD CALC: 34.6 % — LOW (ref 39–50)
HGB BLD-MCNC: 10.8 G/DL — LOW (ref 13–17)
MAGNESIUM SERPL-MCNC: 2.4 MG/DL — SIGNIFICANT CHANGE UP (ref 1.6–2.6)
MCHC RBC-ENTMCNC: 30.7 PG — SIGNIFICANT CHANGE UP (ref 27–34)
MCHC RBC-ENTMCNC: 31.2 GM/DL — LOW (ref 32–36)
MCV RBC AUTO: 98.3 FL — SIGNIFICANT CHANGE UP (ref 80–100)
NRBC # BLD: 0 /100 WBCS — SIGNIFICANT CHANGE UP (ref 0–0)
PHOSPHATE SERPL-MCNC: 5.4 MG/DL — HIGH (ref 2.5–4.5)
PLATELET # BLD AUTO: 110 K/UL — LOW (ref 150–400)
POTASSIUM SERPL-MCNC: 4.1 MMOL/L — SIGNIFICANT CHANGE UP (ref 3.5–5.3)
POTASSIUM SERPL-SCNC: 4.1 MMOL/L — SIGNIFICANT CHANGE UP (ref 3.5–5.3)
RBC # BLD: 3.52 M/UL — LOW (ref 4.2–5.8)
RBC # FLD: 15.9 % — HIGH (ref 10.3–14.5)
SODIUM SERPL-SCNC: 137 MMOL/L — SIGNIFICANT CHANGE UP (ref 135–145)
WBC # BLD: 5.89 K/UL — SIGNIFICANT CHANGE UP (ref 3.8–10.5)
WBC # FLD AUTO: 5.89 K/UL — SIGNIFICANT CHANGE UP (ref 3.8–10.5)

## 2023-10-16 PROCEDURE — 86870 RBC ANTIBODY IDENTIFICATION: CPT

## 2023-10-16 PROCEDURE — 99239 HOSP IP/OBS DSCHRG MGMT >30: CPT

## 2023-10-16 PROCEDURE — 86905 BLOOD TYPING RBC ANTIGENS: CPT

## 2023-10-16 PROCEDURE — 86900 BLOOD TYPING SEROLOGIC ABO: CPT

## 2023-10-16 PROCEDURE — 86803 HEPATITIS C AB TEST: CPT

## 2023-10-16 PROCEDURE — 71045 X-RAY EXAM CHEST 1 VIEW: CPT

## 2023-10-16 PROCEDURE — 80061 LIPID PANEL: CPT

## 2023-10-16 PROCEDURE — C8929: CPT

## 2023-10-16 PROCEDURE — 80053 COMPREHEN METABOLIC PANEL: CPT

## 2023-10-16 PROCEDURE — 83036 HEMOGLOBIN GLYCOSYLATED A1C: CPT

## 2023-10-16 PROCEDURE — 93454 CORONARY ARTERY ANGIO S&I: CPT

## 2023-10-16 PROCEDURE — C1769: CPT

## 2023-10-16 PROCEDURE — 85025 COMPLETE CBC W/AUTO DIFF WBC: CPT

## 2023-10-16 PROCEDURE — 71275 CT ANGIOGRAPHY CHEST: CPT | Mod: MA

## 2023-10-16 PROCEDURE — 86850 RBC ANTIBODY SCREEN: CPT

## 2023-10-16 PROCEDURE — 86922 COMPATIBILITY TEST ANTIGLOB: CPT

## 2023-10-16 PROCEDURE — 36415 COLL VENOUS BLD VENIPUNCTURE: CPT

## 2023-10-16 PROCEDURE — 99285 EMERGENCY DEPT VISIT HI MDM: CPT

## 2023-10-16 PROCEDURE — 84443 ASSAY THYROID STIM HORMONE: CPT

## 2023-10-16 PROCEDURE — 85027 COMPLETE CBC AUTOMATED: CPT

## 2023-10-16 PROCEDURE — 80048 BASIC METABOLIC PNL TOTAL CA: CPT

## 2023-10-16 PROCEDURE — 84100 ASSAY OF PHOSPHORUS: CPT

## 2023-10-16 PROCEDURE — 83605 ASSAY OF LACTIC ACID: CPT

## 2023-10-16 PROCEDURE — 85610 PROTHROMBIN TIME: CPT

## 2023-10-16 PROCEDURE — C1887: CPT

## 2023-10-16 PROCEDURE — 83690 ASSAY OF LIPASE: CPT

## 2023-10-16 PROCEDURE — 83880 ASSAY OF NATRIURETIC PEPTIDE: CPT

## 2023-10-16 PROCEDURE — 86901 BLOOD TYPING SEROLOGIC RH(D): CPT

## 2023-10-16 PROCEDURE — 86880 COOMBS TEST DIRECT: CPT

## 2023-10-16 PROCEDURE — C1894: CPT

## 2023-10-16 PROCEDURE — 85730 THROMBOPLASTIN TIME PARTIAL: CPT

## 2023-10-16 PROCEDURE — 84484 ASSAY OF TROPONIN QUANT: CPT

## 2023-10-16 PROCEDURE — 86970 RBC PRETX INCUBATJ W/CHEMICL: CPT

## 2023-10-16 PROCEDURE — 83735 ASSAY OF MAGNESIUM: CPT

## 2023-10-16 PROCEDURE — 81001 URINALYSIS AUTO W/SCOPE: CPT

## 2023-10-16 RX ORDER — ATORVASTATIN CALCIUM 80 MG/1
1 TABLET, FILM COATED ORAL
Qty: 30 | Refills: 0
Start: 2023-10-16 | End: 2023-11-14

## 2023-10-16 RX ORDER — ASPIRIN/CALCIUM CARB/MAGNESIUM 324 MG
1 TABLET ORAL
Qty: 30 | Refills: 0
Start: 2023-10-16 | End: 2023-11-14

## 2023-10-16 RX ORDER — VALSARTAN 80 MG/1
1 TABLET ORAL
Qty: 30 | Refills: 0
Start: 2023-10-16 | End: 2023-11-14

## 2023-10-16 RX ADMIN — VALSARTAN 40 MILLIGRAM(S): 80 TABLET ORAL at 06:01

## 2023-10-16 RX ADMIN — DAPAGLIFLOZIN 10 MILLIGRAM(S): 10 TABLET, FILM COATED ORAL at 12:00

## 2023-10-16 RX ADMIN — MONTELUKAST 10 MILLIGRAM(S): 4 TABLET, CHEWABLE ORAL at 12:01

## 2023-10-16 RX ADMIN — Medication 81 MILLIGRAM(S): at 12:02

## 2023-10-16 RX ADMIN — LORATADINE 10 MILLIGRAM(S): 10 TABLET ORAL at 12:01

## 2023-10-16 RX ADMIN — Medication 25 MILLIGRAM(S): at 06:02

## 2023-10-16 RX ADMIN — CHLORHEXIDINE GLUCONATE 1 APPLICATION(S): 213 SOLUTION TOPICAL at 12:15

## 2023-10-16 RX ADMIN — Medication 50 MICROGRAM(S): at 06:02

## 2023-10-16 RX ADMIN — Medication 400 MILLIGRAM(S): at 12:01

## 2023-10-16 NOTE — DISCHARGE NOTE NURSING/CASE MANAGEMENT/SOCIAL WORK - NSDCFUADDAPPT_GEN_ALL_CORE_FT
APPTS ARE READY TO BE MADE: [X] YES    Best Family or Patient Contact (if needed):    Additional Information about above appointments (if needed):    1: Follow up with PCP Dr. Pitt within 1 week of DC   2: Follow up with Cardiology Dr. Lane outpatient within 1 week   3:Follow up with Oncology Dr. Block outpatient     Other comments or requests:

## 2023-10-16 NOTE — PATIENT PROFILE ADULT - CENTRAL VENOUS CATHETER/PICC LINE
Wellness Visit for Adults   AMBULATORY CARE:   A wellness visit  is when you see your healthcare provider to get screened for health problems. Your healthcare provider will also give you advice on how to stay healthy. Write down your questions so you remember to ask them. Ask your healthcare provider how often you should have a wellness visit. What happens at a wellness visit:  Your healthcare provider will ask about your health, and your family history of health problems. This includes high blood pressure, heart disease, and cancer. He or she will ask if you have symptoms that concern you, if you smoke, and about your mood. You may also be asked about your intake of medicines, supplements, food, and alcohol. Any of the following may be done: Your weight  will be checked. Your height may also be checked so your body mass index (BMI) can be calculated. Your BMI shows if you are at a healthy weight. Your blood pressure  and heart rate will be checked. Your temperature may also be checked. Blood and urine tests  may be done. Blood tests may be done to check your cholesterol levels. Abnormal cholesterol levels increase your risk for heart disease and stroke. You may also need a blood or urine test to check for diabetes if you are at increased risk. Urine tests may be done to look for signs of an infection or kidney disease. A physical exam  includes checking your heartbeat and lungs with a stethoscope. Your healthcare provider may also check your skin to look for sun damage. Screening tests  may be recommended. A screening test is done to check for diseases that may not cause symptoms. The screening tests you may need depend on your age, gender, family history, and lifestyle habits. For example, colorectal screening may be recommended if you are 48years old or older. Screening tests you need if you are a woman:   A Pap smear  is used to screen for cervical cancer.  Pap smears are usually done every 3 to 5 years depending on your age. You may need them more often if you have had abnormal Pap smear test results in the past. Ask your healthcare provider how often you should have a Pap smear. A mammogram  is an x-ray of your breasts to screen for breast cancer. Experts recommend mammograms every 2 years starting at age 48 years. You may need a mammogram at age 52 years or younger if you have an increased risk for breast cancer. Talk to your healthcare provider about when you should start having mammograms and how often you need them. Vaccines you may need:   Get an influenza vaccine  every year. The influenza vaccine protects you from the flu. Several types of viruses cause the flu. The viruses change over time, so new vaccines are made each year. Get a tetanus-diphtheria (Td) booster vaccine  every 10 years. This vaccine protects you against tetanus and diphtheria. Tetanus is a severe infection that may cause painful muscle spasms and lockjaw. Diphtheria is a severe bacterial infection that causes a thick covering in the back of your mouth and throat. Get a human papillomavirus (HPV) vaccine  if you are female and aged 23 to 32 or male 23 to 24 and never received it. This vaccine protects you from HPV infection. HPV is the most common infection spread by sexual contact. HPV may also cause vaginal, penile, and anal cancers. Get a pneumococcal vaccine  if you are aged 72 years or older. The pneumococcal vaccine is an injection given to protect you from pneumococcal disease. Pneumococcal disease is an infection caused by pneumococcal bacteria. The infection may cause pneumonia, meningitis, or an ear infection. Get a shingles vaccine  if you are 60 or older, even if you have had shingles before. The shingles vaccine is an injection to protect you from the varicella-zoster virus. This is the same virus that causes chickenpox.  Shingles is a painful rash that develops in people who had chickenpox or have been exposed to the virus. How to eat healthy:  My Plate is a model for planning healthy meals. It shows the types and amounts of foods that should go on your plate. Fruits and vegetables make up about half of your plate, and grains and protein make up the other half. A serving of dairy is included on the side of your plate. The amount of calories and serving sizes you need depends on your age, gender, weight, and height. Examples of healthy foods are listed below:  Eat a variety of vegetables  such as dark green, red, and orange vegetables. You can also include canned vegetables low in sodium (salt) and frozen vegetables without added butter or sauces. Eat a variety of fresh fruits , canned fruit in 100% juice, frozen fruit, and dried fruit. Include whole grains. At least half of the grains you eat should be whole grains. Examples include whole-wheat bread, wheat pasta, brown rice, and whole-grain cereals such as oatmeal.    Eat a variety of protein foods such as seafood (fish and shellfish), lean meat, and poultry without skin (turkey and chicken). Examples of lean meats include pork leg, shoulder, or tenderloin, and beef round, sirloin, tenderloin, and extra lean ground beef. Other protein foods include eggs and egg substitutes, beans, peas, soy products, nuts, and seeds. Choose low-fat dairy products such as skim or 1% milk or low-fat yogurt, cheese, and cottage cheese. Limit unhealthy fats  such as butter, hard margarine, and shortening. Exercise:  Exercise at least 30 minutes per day on most days of the week. Some examples of exercise include walking, biking, dancing, and swimming. You can also fit in more physical activity by taking the stairs instead of the elevator or parking farther away from stores. Include muscle strengthening activities 2 days each week. Regular exercise provides many health benefits.  It helps you manage your weight, and decreases your risk for type 2 diabetes, heart disease, stroke, and high blood pressure. Exercise can also help improve your mood. Ask your healthcare provider about the best exercise plan for you. General health and safety guidelines:   Do not smoke. Nicotine and other chemicals in cigarettes and cigars can cause lung damage. Ask your healthcare provider for information if you currently smoke and need help to quit. E-cigarettes or smokeless tobacco still contain nicotine. Talk to your healthcare provider before you use these products. Limit alcohol. A drink of alcohol is 12 ounces of beer, 5 ounces of wine, or 1½ ounces of liquor. Lose weight, if needed. Being overweight increases your risk of certain health conditions. These include heart disease, high blood pressure, type 2 diabetes, and certain types of cancer. Protect your skin. Do not sunbathe or use tanning beds. Use sunscreen with a SPF 15 or higher. Apply sunscreen at least 15 minutes before you go outside. Reapply sunscreen every 2 hours. Wear protective clothing, hats, and sunglasses when you are outside. Drive safely. Always wear your seatbelt. Make sure everyone in your car wears a seatbelt. A seatbelt can save your life if you are in an accident. Do not use your cell phone when you are driving. This could distract you and cause an accident. Pull over if you need to make a call or send a text message. Practice safe sex. Use latex condoms if are sexually active and have more than one partner. Your healthcare provider may recommend screening tests for sexually transmitted infections (STIs). Wear helmets, lifejackets, and protective gear. Always wear a helmet when you ride a bike or motorcycle, go skiing, or play sports that could cause a head injury. Wear protective equipment when you play sports. Wear a lifejacket when you are on a boat or doing water sports.     © Copyright [facility] 2023 Information is for End User's use only and may not be sold, redistributed or otherwise used for commercial purposes. The above information is an  only. It is not intended as medical advice for individual conditions or treatments. Talk to your doctor, nurse or pharmacist before following any medical regimen to see if it is safe and effective for you. no

## 2023-10-16 NOTE — PHARMACOTHERAPY INTERVENTION NOTE - COMMENTS
Counseled patient's son in law at bedside who translated for patient  on the following inpatient/discharge medications names (brand/generic), indication, and possible side effects:  NEW MEDS:  Atorvastatin 40mg daily at bedtime  Farxiga 10mg daily  metoprolol XL 25mg daily  valsartan 40mg daily    - Counseled patient to take Farxiga in AM to avoid nighttime urination.  - Patient was provided with a medication card for their new medication. Answered all of the patient’s questions to the best of my ability. Patient exhibited understanding of heart failure medication regimen and management. Patient understood importance of compliance and to follow up with cardiologist outpatient after discharge.    All RXS sent to pt's preferred [facility] pharmacy, copay is $0 for all meds.     Asuncion Cedillo PharmD, [facility]  Clinical Pharmacy Specialist  Teams (preferred) or [phone]

## 2023-10-16 NOTE — PROGRESS NOTE ADULT - SUBJECTIVE AND OBJECTIVE BOX
Tory Cornejo MD  Hospitalist  Available on MS Teams      PROGRESS NOTE:     Patient is a 67y old  Male who presents with a chief complaint of chest pain (16 Oct 2023 11:52)      SUBJECTIVE / OVERNIGHT EVENTS:  No acute events overnight.   Pt was assessed with son at bedside. Denies any chest pain, shortness of breath, or palpitations.      MEDICATIONS  (STANDING):  acyclovir   Oral Tab/Cap 400 milliGRAM(s) Oral daily  aspirin enteric coated 81 milliGRAM(s) Oral daily  atorvastatin 40 milliGRAM(s) Oral at bedtime  chlorhexidine 2% Cloths 1 Application(s) Topical daily  dapagliflozin 10 milliGRAM(s) Oral every 24 hours  levothyroxine 50 MICROGram(s) Oral daily  loratadine 10 milliGRAM(s) Oral daily  metoprolol succinate ER 25 milliGRAM(s) Oral daily  montelukast 10 milliGRAM(s) Oral daily  sodium chloride 0.9%. 1000 milliLiter(s) (75 mL/Hr) IV Continuous <Continuous>  valsartan 40 milliGRAM(s) Oral daily    MEDICATIONS  (PRN):  acetaminophen     Tablet .. 650 milliGRAM(s) Oral every 6 hours PRN Temp greater or equal to 38C (100.4F), Mild Pain (1 - 3)  aluminum hydroxide/magnesium hydroxide/simethicone Suspension 30 milliLiter(s) Oral every 4 hours PRN Dyspepsia  melatonin 3 milliGRAM(s) Oral at bedtime PRN Insomnia  ondansetron Injectable 4 milliGRAM(s) IV Push every 8 hours PRN Nausea and/or Vomiting      CAPILLARY BLOOD GLUCOSE        I&O's Summary      PHYSICAL EXAM:  Vital Signs Last 24 Hrs  T(C): 36.6 (16 Oct 2023 11:46), Max: 36.7 (16 Oct 2023 05:18)  T(F): 97.8 (16 Oct 2023 11:46), Max: 98.1 (16 Oct 2023 05:18)  HR: 85 (16 Oct 2023 11:46) (78 - 85)  BP: 105/68 (16 Oct 2023 11:46) (105/68 - 144/91)  BP(mean): --  RR: 18 (16 Oct 2023 11:46) (18 - 18)  SpO2: 97% (16 Oct 2023 11:46) (97% - 98%)    Parameters below as of 16 Oct 2023 11:46  Patient On (Oxygen Delivery Method): room air      CONSTITUTIONAL: Well-groomed, in no apparent distress  EYES: No conjunctival or scleral injection, non-icteric  ENMT: No external nasal lesions; MMM  RESPIRATORY: Breathing comfortably; lungs CTA without wheeze/rhonchi/rales  CARDIOVASCULAR: +S1S2, RRR, no M/G/R; pedal pulses full and symmetric; no lower extremity edema. Right radial area s/p access for cardiac cath is without hematoma.  GASTROINTESTINAL: No tenderness, no rebound/guarding  NEUROLOGIC: No gross focal neurological deficits  PSYCHIATRIC: mood and affect appropriate; appropriate insight and judgment      LABS:                        10.8   5.89  )-----------( 110      ( 16 Oct 2023 06:31 )             34.6     10-16    137  |  101  |  24<H>  ----------------------------<  106<H>  4.1   |  25  |  1.24    Ca    9.5      16 Oct 2023 06:31  Phos  5.4     10-16  Mg     2.4     10-16      PT/INR - ( 15 Oct 2023 06:24 )   PT: 10.8 sec;   INR: 1.03 ratio         PTT - ( 15 Oct 2023 06:24 )  PTT:25.8 sec      Urinalysis Basic - ( 16 Oct 2023 06:31 )    Color: x / Appearance: x / SG: x / pH: x  Gluc: 106 mg/dL / Ketone: x  / Bili: x / Urobili: x   Blood: x / Protein: x / Nitrite: x   Leuk Esterase: x / RBC: x / WBC x   Sq Epi: x / Non Sq Epi: x / Bacteria: x

## 2023-10-16 NOTE — PROGRESS NOTE ADULT - PROBLEM SELECTOR PLAN 5
VTE ppx: lovenox (on hold for cath tomorrow, restart after)  GI ppx: n/i  Dispo pending initiation of meds for new NICM.
VTE ppx: lovenox (on hold for cath tomorrow, restart after)  GI ppx: n/i
VTE ppx: lovenox   GI ppx: n/i  Dispo discharge home today, with outpatient cardiology follow up

## 2023-10-16 NOTE — DISCHARGE NOTE NURSING/CASE MANAGEMENT/SOCIAL WORK - PATIENT PORTAL LINK FT
You can access the FollowMyHealth Patient Portal offered by [facility] by registering at the following website: [email or web address]. By joining [facility]’s FollowMyHealth portal, you will also be able to view your health information using other applications (apps) compatible with our system.

## 2023-10-16 NOTE — PROGRESS NOTE ADULT - SUBJECTIVE AND OBJECTIVE BOX
DATE OF SERVICE: 10-16-23 @ 11:52    Patient is a 67y old  Male who presents with a chief complaint of chest pain (15 Oct 2023 16:51)      INTERVAL HISTORY: Feels ok.     REVIEW OF SYSTEMS:  CONSTITUTIONAL: No weakness  EYES/ENT: No visual changes;  No throat pain   NECK: No pain or stiffness  RESPIRATORY: No cough, wheezing; No shortness of breath  CARDIOVASCULAR: No chest pain or palpitations  GASTROINTESTINAL: No abdominal  pain. No nausea, vomiting, or hematemesis  GENITOURINARY: No dysuria, frequency or hematuria  NEUROLOGICAL: No stroke like symptoms  SKIN: No rashes    TELEMETRY Personally reviewed: SR 60-70  	  MEDICATIONS:  metoprolol succinate ER 25 milliGRAM(s) Oral daily  valsartan 40 milliGRAM(s) Oral daily        PHYSICAL EXAM:  T(C): 36.6 (10-16-23 @ 11:46), Max: 36.7 (10-16-23 @ 05:18)  HR: 85 (10-16-23 @ 11:46) (68 - 85)  BP: 105/68 (10-16-23 @ 11:46) (105/68 - 144/91)  RR: 18 (10-16-23 @ 11:46) (18 - 18)  SpO2: 97% (10-16-23 @ 11:46) (97% - 98%)  Wt(kg): --  I&O's Summary        Appearance: In no distress	  HEENT:    PERRL, EOMI	  Cardiovascular:  S1 S2, No JVD  Respiratory: Lungs clear to auscultation	  Gastrointestinal:  Soft, Non-tender, + BS	  Vascularature:  No edema of LE  Psychiatric: Appropriate affect   Neuro: no acute focal deficits                               10.8   5.89  )-----------( 110      ( 16 Oct 2023 06:31 )             34.6     10-16    137  |  101  |  24<H>  ----------------------------<  106<H>  4.1   |  25  |  1.24    Ca    9.5      16 Oct 2023 06:31  Phos  5.4     10-16  Mg     2.4     10-16          Labs personally reviewed      ASSESSMENT/PLAN: 	      67M PMhx significant for  hypothyroidism and asthma, MM, was on different chemo previously but recently started on Kyprolis and Darzalex.  Pt reports 3-4 weeks of fatigue but attributed to chemo. Then, on 10/12, pt had severe chest pressure (indicates whole anterior chest) with shortness of breath while ambulating on flat surface; denies diaphoresis, radiation of the pain, palpitation nausea, or vomiting. It resolved w/ rest in few minutes.     1. Typical Angina  - Described as chest pressure and SOB which occurred with exertion x 1 month   - ECG NSR with no ischemic characteristics noted  - Trop neg x2  - CT Chest neg for PE  - BNP 1530  - TTE with reduced EF of 47%, + WMA, mild PH  - Continue ASA 81mg PO daily, atorvastatin 40mg PO daily  - S/p diagnostic cath with non-obstructive CAD    2. Hypertension  - No formal Hx of HTN  - BP elevated on presentation but now normotensive/soft BP  - Will cont to trend    3. DVT Ppx  - c/w Lovenox 40mg SQ daily    4. Hypothyroidism  - TSH noted  - c/w Synthroid    5. Heart Failure with Reduced Ejection Fraction  - Patient now with diagnostic cath therefore non ischemic cardiomyopathy. Likely 2/2 chemo treatments.  - Will initiate sHF GDMT, Will need close OP follow up with further medication titration.  - Cont Toprol 25mg PO daily, Valsartan 40mg PO daily, Farxiga 10mg PO daily         NEEL Conteh-RANDY Lane DO [facility]  Cardiovascular Medicine  [address] [place] [address], [address]  Available through call or text on [employer]  Office: [phone]

## 2023-10-16 NOTE — PROGRESS NOTE ADULT - PROBLEM SELECTOR PLAN 2
-hematologist: Dr. Agustin Block @ [facility].   -obtain outside record.  -c/w acylovir ppx  -*of note, unable to find patient on [facility]  for oxycodone. Reportedly only needs it once a week or so. Did not order.
-hematologist: Dr. Agustin Block @ [facility].   -obtain outside record.  -c/w acylovir ppx  -*of note, unable to find patient on [facility]  for oxycodone. Reportedly only needs it once a week or so. Did not order.
-hematologist: Dr. Agustin Block @ [facility].   -c/w acylovir ppx  -*of note, unable to find patient on [facility]  for oxycodone. Reportedly only needs it once a week or so. Did not order.

## 2023-10-16 NOTE — PROGRESS NOTE ADULT - ASSESSMENT
67M PMhx significant for hypothyroidism, asthma, and MM with active tx presenting with exertional chest pain, pending workup. 
67M PMhx significant for hypothyroidism, asthma, and MM who presents with chest pain. He was found to have a new reduction in EF to 47%. Left heart cath showed nonobstructive CAD. The patient was started on new GDMT. Symptoms are resolved. The patient is stable for discharge home with outpatient cardiology follow up.
67M PMhx significant for hypothyroidism, asthma, and MM with active tx presenting with exertional chest pain, pending workup.

## 2023-10-16 NOTE — PROGRESS NOTE ADULT - PROBLEM SELECTOR PLAN 1
-chest pressure with shortness of breath on exertion; relieved w/ rest. Likely ongoing for months, but worse recently.   -also, i/s/o anemia, which is stable 10-11  -trop 11-->11.  pro-BNP 1530   -s/p full dose ASA. Not concerned for ACS at this time.   -c/w statin  -No prior ischemic eval  -Appreciate cardiology recs, pending coronary angiogram likely sunday. Hold lovenox tomorrow morning.   - TTE; LVEF= 47% with WMA. Normal right ventricular cavity size and systolic function. mild MR. mild pulmonary hypertension. mild to mod TR.
New NICM.   -Compensated on exam  -trop 11-->11.  pro-BNP 1530   - TTE; LVEF= 47% with WMA. Normal right ventricular cavity size and systolic function. mild MR. mild pulmonary hypertension. mild to mod TR.  -s/p LHC with normal coronary anatomy  -c/w aspirin and statin per cardiology recs  -Will need intiation and uptitration of GDMT.   --start metoprolol succinate 25mg PO daily, valsartan 20mg PO daily, and Farxiga 10mg PO daily (will confirm its covered with pharmacy), per cardiology recs.   -appreciate cardiology recs.
New NICM.   -Compensated on exam  -trop 11-->11.  pro-BNP 1530   - TTE; LVEF= 47% with WMA. Normal right ventricular cavity size and systolic function. mild MR. mild pulmonary hypertension. mild to mod TR.  -s/p LHC with normal coronary anatomy  -c/w aspirin and statin per cardiology recs  -Will need intiation and uptitration of GDMT.   --start metoprolol succinate 25mg PO daily, valsartan 20mg PO daily, and Farxiga 10mg PO daily, per cardiology recs.   -appreciate cardiology recs  - close outpatient cardiology follow up

## 2023-12-04 ENCOUNTER — INPATIENT (INPATIENT)
Facility: HOSPITAL | Age: 68
LOS: 3 days | Discharge: HOME | DRG: 193 | End: 2023-12-08
Attending: INTERNAL MEDICINE | Admitting: INTERNAL MEDICINE
Payer: MEDICAID

## 2023-12-04 VITALS
HEIGHT: 64 IN | OXYGEN SATURATION: 96 % | RESPIRATION RATE: 18 BRPM | SYSTOLIC BLOOD PRESSURE: 109 MMHG | DIASTOLIC BLOOD PRESSURE: 73 MMHG | TEMPERATURE: 98 F | HEART RATE: 100 BPM

## 2023-12-04 DIAGNOSIS — R06.02 SHORTNESS OF BREATH: ICD-10-CM

## 2023-12-04 DIAGNOSIS — Z29.9 ENCOUNTER FOR PROPHYLACTIC MEASURES, UNSPECIFIED: ICD-10-CM

## 2023-12-04 DIAGNOSIS — C90.00 MULTIPLE MYELOMA NOT HAVING ACHIEVED REMISSION: ICD-10-CM

## 2023-12-04 DIAGNOSIS — E03.9 HYPOTHYROIDISM, UNSPECIFIED: ICD-10-CM

## 2023-12-04 DIAGNOSIS — I42.8 OTHER CARDIOMYOPATHIES: ICD-10-CM

## 2023-12-04 DIAGNOSIS — J96.01 ACUTE RESPIRATORY FAILURE WITH HYPOXIA: ICD-10-CM

## 2023-12-04 DIAGNOSIS — J45.901 UNSPECIFIED ASTHMA WITH (ACUTE) EXACERBATION: ICD-10-CM

## 2023-12-04 DIAGNOSIS — B33.8 OTHER SPECIFIED VIRAL DISEASES: ICD-10-CM

## 2023-12-04 PROBLEM — J45.909 UNSPECIFIED ASTHMA, UNCOMPLICATED: Chronic | Status: ACTIVE | Noted: 2023-10-13

## 2023-12-04 LAB
ALBUMIN SERPL ELPH-MCNC: 3.6 G/DL — SIGNIFICANT CHANGE UP (ref 3.3–5)
ALBUMIN SERPL ELPH-MCNC: 3.6 G/DL — SIGNIFICANT CHANGE UP (ref 3.3–5)
ALP SERPL-CCNC: 69 U/L — SIGNIFICANT CHANGE UP (ref 40–120)
ALP SERPL-CCNC: 69 U/L — SIGNIFICANT CHANGE UP (ref 40–120)
ALT FLD-CCNC: 18 U/L — SIGNIFICANT CHANGE UP (ref 10–45)
ALT FLD-CCNC: 18 U/L — SIGNIFICANT CHANGE UP (ref 10–45)
ANION GAP SERPL CALC-SCNC: 12 MMOL/L — SIGNIFICANT CHANGE UP (ref 5–17)
ANION GAP SERPL CALC-SCNC: 12 MMOL/L — SIGNIFICANT CHANGE UP (ref 5–17)
APPEARANCE UR: CLEAR — SIGNIFICANT CHANGE UP
APPEARANCE UR: CLEAR — SIGNIFICANT CHANGE UP
APTT BLD: 28.4 SEC — SIGNIFICANT CHANGE UP (ref 24.5–35.6)
APTT BLD: 28.4 SEC — SIGNIFICANT CHANGE UP (ref 24.5–35.6)
AST SERPL-CCNC: 16 U/L — SIGNIFICANT CHANGE UP (ref 10–40)
AST SERPL-CCNC: 16 U/L — SIGNIFICANT CHANGE UP (ref 10–40)
BASE EXCESS BLDV CALC-SCNC: -1.7 MMOL/L — SIGNIFICANT CHANGE UP (ref -2–3)
BASE EXCESS BLDV CALC-SCNC: -1.7 MMOL/L — SIGNIFICANT CHANGE UP (ref -2–3)
BASOPHILS # BLD AUTO: 0.02 K/UL — SIGNIFICANT CHANGE UP (ref 0–0.2)
BASOPHILS # BLD AUTO: 0.02 K/UL — SIGNIFICANT CHANGE UP (ref 0–0.2)
BASOPHILS NFR BLD AUTO: 0.2 % — SIGNIFICANT CHANGE UP (ref 0–2)
BASOPHILS NFR BLD AUTO: 0.2 % — SIGNIFICANT CHANGE UP (ref 0–2)
BILIRUB SERPL-MCNC: 0.4 MG/DL — SIGNIFICANT CHANGE UP (ref 0.2–1.2)
BILIRUB SERPL-MCNC: 0.4 MG/DL — SIGNIFICANT CHANGE UP (ref 0.2–1.2)
BILIRUB UR-MCNC: NEGATIVE — SIGNIFICANT CHANGE UP
BILIRUB UR-MCNC: NEGATIVE — SIGNIFICANT CHANGE UP
BUN SERPL-MCNC: 12 MG/DL — SIGNIFICANT CHANGE UP (ref 7–23)
BUN SERPL-MCNC: 12 MG/DL — SIGNIFICANT CHANGE UP (ref 7–23)
CA-I SERPL-SCNC: 1.22 MMOL/L — SIGNIFICANT CHANGE UP (ref 1.15–1.33)
CA-I SERPL-SCNC: 1.22 MMOL/L — SIGNIFICANT CHANGE UP (ref 1.15–1.33)
CALCIUM SERPL-MCNC: 9.3 MG/DL — SIGNIFICANT CHANGE UP (ref 8.4–10.5)
CALCIUM SERPL-MCNC: 9.3 MG/DL — SIGNIFICANT CHANGE UP (ref 8.4–10.5)
CHLORIDE BLDV-SCNC: 102 MMOL/L — SIGNIFICANT CHANGE UP (ref 96–108)
CHLORIDE BLDV-SCNC: 102 MMOL/L — SIGNIFICANT CHANGE UP (ref 96–108)
CHLORIDE SERPL-SCNC: 101 MMOL/L — SIGNIFICANT CHANGE UP (ref 96–108)
CHLORIDE SERPL-SCNC: 101 MMOL/L — SIGNIFICANT CHANGE UP (ref 96–108)
CO2 BLDV-SCNC: 27 MMOL/L — HIGH (ref 22–26)
CO2 BLDV-SCNC: 27 MMOL/L — HIGH (ref 22–26)
CO2 SERPL-SCNC: 25 MMOL/L — SIGNIFICANT CHANGE UP (ref 22–31)
CO2 SERPL-SCNC: 25 MMOL/L — SIGNIFICANT CHANGE UP (ref 22–31)
COLOR SPEC: YELLOW — SIGNIFICANT CHANGE UP
COLOR SPEC: YELLOW — SIGNIFICANT CHANGE UP
CREAT SERPL-MCNC: 1.09 MG/DL — SIGNIFICANT CHANGE UP (ref 0.5–1.3)
CREAT SERPL-MCNC: 1.09 MG/DL — SIGNIFICANT CHANGE UP (ref 0.5–1.3)
DIFF PNL FLD: NEGATIVE — SIGNIFICANT CHANGE UP
DIFF PNL FLD: NEGATIVE — SIGNIFICANT CHANGE UP
EGFR: 74 ML/MIN/1.73M2 — SIGNIFICANT CHANGE UP
EGFR: 74 ML/MIN/1.73M2 — SIGNIFICANT CHANGE UP
EOSINOPHIL # BLD AUTO: 0.16 K/UL — SIGNIFICANT CHANGE UP (ref 0–0.5)
EOSINOPHIL # BLD AUTO: 0.16 K/UL — SIGNIFICANT CHANGE UP (ref 0–0.5)
EOSINOPHIL NFR BLD AUTO: 1.6 % — SIGNIFICANT CHANGE UP (ref 0–6)
EOSINOPHIL NFR BLD AUTO: 1.6 % — SIGNIFICANT CHANGE UP (ref 0–6)
FLUAV AG NPH QL: SIGNIFICANT CHANGE UP
FLUAV AG NPH QL: SIGNIFICANT CHANGE UP
FLUBV AG NPH QL: SIGNIFICANT CHANGE UP
FLUBV AG NPH QL: SIGNIFICANT CHANGE UP
GAS PNL BLDV: 136 MMOL/L — SIGNIFICANT CHANGE UP (ref 136–145)
GAS PNL BLDV: 136 MMOL/L — SIGNIFICANT CHANGE UP (ref 136–145)
GAS PNL BLDV: SIGNIFICANT CHANGE UP
GLUCOSE BLDV-MCNC: 122 MG/DL — HIGH (ref 70–99)
GLUCOSE BLDV-MCNC: 122 MG/DL — HIGH (ref 70–99)
GLUCOSE SERPL-MCNC: 126 MG/DL — HIGH (ref 70–99)
GLUCOSE SERPL-MCNC: 126 MG/DL — HIGH (ref 70–99)
GLUCOSE UR QL: NEGATIVE MG/DL — SIGNIFICANT CHANGE UP
GLUCOSE UR QL: NEGATIVE MG/DL — SIGNIFICANT CHANGE UP
HCO3 BLDV-SCNC: 25 MMOL/L — SIGNIFICANT CHANGE UP (ref 22–29)
HCO3 BLDV-SCNC: 25 MMOL/L — SIGNIFICANT CHANGE UP (ref 22–29)
HCT VFR BLD CALC: 33.7 % — LOW (ref 39–50)
HCT VFR BLD CALC: 33.7 % — LOW (ref 39–50)
HCT VFR BLDA CALC: 32 % — LOW (ref 39–51)
HCT VFR BLDA CALC: 32 % — LOW (ref 39–51)
HGB BLD CALC-MCNC: 10.7 G/DL — LOW (ref 12.6–17.4)
HGB BLD CALC-MCNC: 10.7 G/DL — LOW (ref 12.6–17.4)
HGB BLD-MCNC: 10.6 G/DL — LOW (ref 13–17)
HGB BLD-MCNC: 10.6 G/DL — LOW (ref 13–17)
IMM GRANULOCYTES NFR BLD AUTO: 0.3 % — SIGNIFICANT CHANGE UP (ref 0–0.9)
IMM GRANULOCYTES NFR BLD AUTO: 0.3 % — SIGNIFICANT CHANGE UP (ref 0–0.9)
INR BLD: 1.09 RATIO — SIGNIFICANT CHANGE UP (ref 0.85–1.18)
INR BLD: 1.09 RATIO — SIGNIFICANT CHANGE UP (ref 0.85–1.18)
KETONES UR-MCNC: NEGATIVE MG/DL — SIGNIFICANT CHANGE UP
KETONES UR-MCNC: NEGATIVE MG/DL — SIGNIFICANT CHANGE UP
LACTATE BLDV-MCNC: 2 MMOL/L — SIGNIFICANT CHANGE UP (ref 0.5–2)
LACTATE BLDV-MCNC: 2 MMOL/L — SIGNIFICANT CHANGE UP (ref 0.5–2)
LEUKOCYTE ESTERASE UR-ACNC: NEGATIVE — SIGNIFICANT CHANGE UP
LEUKOCYTE ESTERASE UR-ACNC: NEGATIVE — SIGNIFICANT CHANGE UP
LYMPHOCYTES # BLD AUTO: 2.8 K/UL — SIGNIFICANT CHANGE UP (ref 1–3.3)
LYMPHOCYTES # BLD AUTO: 2.8 K/UL — SIGNIFICANT CHANGE UP (ref 1–3.3)
LYMPHOCYTES # BLD AUTO: 28.1 % — SIGNIFICANT CHANGE UP (ref 13–44)
LYMPHOCYTES # BLD AUTO: 28.1 % — SIGNIFICANT CHANGE UP (ref 13–44)
MCHC RBC-ENTMCNC: 29.9 PG — SIGNIFICANT CHANGE UP (ref 27–34)
MCHC RBC-ENTMCNC: 29.9 PG — SIGNIFICANT CHANGE UP (ref 27–34)
MCHC RBC-ENTMCNC: 31.5 GM/DL — LOW (ref 32–36)
MCHC RBC-ENTMCNC: 31.5 GM/DL — LOW (ref 32–36)
MCV RBC AUTO: 95.2 FL — SIGNIFICANT CHANGE UP (ref 80–100)
MCV RBC AUTO: 95.2 FL — SIGNIFICANT CHANGE UP (ref 80–100)
MONOCYTES # BLD AUTO: 1.04 K/UL — HIGH (ref 0–0.9)
MONOCYTES # BLD AUTO: 1.04 K/UL — HIGH (ref 0–0.9)
MONOCYTES NFR BLD AUTO: 10.4 % — SIGNIFICANT CHANGE UP (ref 2–14)
MONOCYTES NFR BLD AUTO: 10.4 % — SIGNIFICANT CHANGE UP (ref 2–14)
NEUTROPHILS # BLD AUTO: 5.93 K/UL — SIGNIFICANT CHANGE UP (ref 1.8–7.4)
NEUTROPHILS # BLD AUTO: 5.93 K/UL — SIGNIFICANT CHANGE UP (ref 1.8–7.4)
NEUTROPHILS NFR BLD AUTO: 59.4 % — SIGNIFICANT CHANGE UP (ref 43–77)
NEUTROPHILS NFR BLD AUTO: 59.4 % — SIGNIFICANT CHANGE UP (ref 43–77)
NITRITE UR-MCNC: NEGATIVE — SIGNIFICANT CHANGE UP
NITRITE UR-MCNC: NEGATIVE — SIGNIFICANT CHANGE UP
NRBC # BLD: 0 /100 WBCS — SIGNIFICANT CHANGE UP (ref 0–0)
NRBC # BLD: 0 /100 WBCS — SIGNIFICANT CHANGE UP (ref 0–0)
PCO2 BLDV: 51 MMHG — SIGNIFICANT CHANGE UP (ref 42–55)
PCO2 BLDV: 51 MMHG — SIGNIFICANT CHANGE UP (ref 42–55)
PH BLDV: 7.3 — LOW (ref 7.32–7.43)
PH BLDV: 7.3 — LOW (ref 7.32–7.43)
PH UR: 6 — SIGNIFICANT CHANGE UP (ref 5–8)
PH UR: 6 — SIGNIFICANT CHANGE UP (ref 5–8)
PLATELET # BLD AUTO: 104 K/UL — LOW (ref 150–400)
PLATELET # BLD AUTO: 104 K/UL — LOW (ref 150–400)
PO2 BLDV: 27 MMHG — SIGNIFICANT CHANGE UP (ref 25–45)
PO2 BLDV: 27 MMHG — SIGNIFICANT CHANGE UP (ref 25–45)
POTASSIUM BLDV-SCNC: 4.1 MMOL/L — SIGNIFICANT CHANGE UP (ref 3.5–5.1)
POTASSIUM BLDV-SCNC: 4.1 MMOL/L — SIGNIFICANT CHANGE UP (ref 3.5–5.1)
POTASSIUM SERPL-MCNC: 4 MMOL/L — SIGNIFICANT CHANGE UP (ref 3.5–5.3)
POTASSIUM SERPL-MCNC: 4 MMOL/L — SIGNIFICANT CHANGE UP (ref 3.5–5.3)
POTASSIUM SERPL-SCNC: 4 MMOL/L — SIGNIFICANT CHANGE UP (ref 3.5–5.3)
POTASSIUM SERPL-SCNC: 4 MMOL/L — SIGNIFICANT CHANGE UP (ref 3.5–5.3)
PROT SERPL-MCNC: 6.9 G/DL — SIGNIFICANT CHANGE UP (ref 6–8.3)
PROT SERPL-MCNC: 6.9 G/DL — SIGNIFICANT CHANGE UP (ref 6–8.3)
PROT UR-MCNC: SIGNIFICANT CHANGE UP MG/DL
PROT UR-MCNC: SIGNIFICANT CHANGE UP MG/DL
PROTHROM AB SERPL-ACNC: 11.4 SEC — SIGNIFICANT CHANGE UP (ref 9.5–13)
PROTHROM AB SERPL-ACNC: 11.4 SEC — SIGNIFICANT CHANGE UP (ref 9.5–13)
RBC # BLD: 3.54 M/UL — LOW (ref 4.2–5.8)
RBC # BLD: 3.54 M/UL — LOW (ref 4.2–5.8)
RBC # FLD: 16 % — HIGH (ref 10.3–14.5)
RBC # FLD: 16 % — HIGH (ref 10.3–14.5)
RSV RNA NPH QL NAA+NON-PROBE: DETECTED
RSV RNA NPH QL NAA+NON-PROBE: DETECTED
SAO2 % BLDV: 23.8 % — LOW (ref 67–88)
SAO2 % BLDV: 23.8 % — LOW (ref 67–88)
SARS-COV-2 RNA SPEC QL NAA+PROBE: SIGNIFICANT CHANGE UP
SARS-COV-2 RNA SPEC QL NAA+PROBE: SIGNIFICANT CHANGE UP
SODIUM SERPL-SCNC: 138 MMOL/L — SIGNIFICANT CHANGE UP (ref 135–145)
SODIUM SERPL-SCNC: 138 MMOL/L — SIGNIFICANT CHANGE UP (ref 135–145)
SP GR SPEC: 1.01 — SIGNIFICANT CHANGE UP (ref 1–1.03)
SP GR SPEC: 1.01 — SIGNIFICANT CHANGE UP (ref 1–1.03)
UROBILINOGEN FLD QL: 0.2 MG/DL — SIGNIFICANT CHANGE UP (ref 0.2–1)
UROBILINOGEN FLD QL: 0.2 MG/DL — SIGNIFICANT CHANGE UP (ref 0.2–1)
WBC # BLD: 9.98 K/UL — SIGNIFICANT CHANGE UP (ref 3.8–10.5)
WBC # BLD: 9.98 K/UL — SIGNIFICANT CHANGE UP (ref 3.8–10.5)
WBC # FLD AUTO: 9.98 K/UL — SIGNIFICANT CHANGE UP (ref 3.8–10.5)
WBC # FLD AUTO: 9.98 K/UL — SIGNIFICANT CHANGE UP (ref 3.8–10.5)

## 2023-12-04 PROCEDURE — 99223 1ST HOSP IP/OBS HIGH 75: CPT

## 2023-12-04 PROCEDURE — 99285 EMERGENCY DEPT VISIT HI MDM: CPT

## 2023-12-04 PROCEDURE — 71046 X-RAY EXAM CHEST 2 VIEWS: CPT | Mod: 26

## 2023-12-04 RX ORDER — LEVOTHYROXINE SODIUM 125 MCG
50 TABLET ORAL DAILY
Refills: 0 | Status: DISCONTINUED | OUTPATIENT
Start: 2023-12-05 | End: 2023-12-08

## 2023-12-04 RX ORDER — FAMOTIDINE 10 MG/ML
40 INJECTION INTRAVENOUS DAILY
Refills: 0 | Status: DISCONTINUED | OUTPATIENT
Start: 2023-12-05 | End: 2023-12-08

## 2023-12-04 RX ORDER — MONTELUKAST 4 MG/1
1 TABLET, CHEWABLE ORAL
Refills: 0 | DISCHARGE

## 2023-12-04 RX ORDER — SENNA PLUS 8.6 MG/1
2 TABLET ORAL
Refills: 0 | DISCHARGE

## 2023-12-04 RX ORDER — LEVOTHYROXINE SODIUM 125 MCG
1 TABLET ORAL
Refills: 0 | DISCHARGE

## 2023-12-04 RX ORDER — SENNA PLUS 8.6 MG/1
2 TABLET ORAL AT BEDTIME
Refills: 0 | Status: DISCONTINUED | OUTPATIENT
Start: 2023-12-04 | End: 2023-12-08

## 2023-12-04 RX ORDER — LORATADINE 10 MG/1
10 TABLET ORAL DAILY
Refills: 0 | Status: DISCONTINUED | OUTPATIENT
Start: 2023-12-04 | End: 2023-12-08

## 2023-12-04 RX ORDER — DEXTROSE 50 % IN WATER 50 %
15 SYRINGE (ML) INTRAVENOUS ONCE
Refills: 0 | Status: DISCONTINUED | OUTPATIENT
Start: 2023-12-04 | End: 2023-12-08

## 2023-12-04 RX ORDER — DEXTROSE 50 % IN WATER 50 %
25 SYRINGE (ML) INTRAVENOUS ONCE
Refills: 0 | Status: DISCONTINUED | OUTPATIENT
Start: 2023-12-04 | End: 2023-12-08

## 2023-12-04 RX ORDER — IPRATROPIUM/ALBUTEROL SULFATE 18-103MCG
3 AEROSOL WITH ADAPTER (GRAM) INHALATION EVERY 6 HOURS
Refills: 0 | Status: DISCONTINUED | OUTPATIENT
Start: 2023-12-04 | End: 2023-12-08

## 2023-12-04 RX ORDER — LORATADINE 10 MG/1
1 TABLET ORAL
Refills: 0 | DISCHARGE

## 2023-12-04 RX ORDER — DAPAGLIFLOZIN 10 MG/1
10 TABLET, FILM COATED ORAL EVERY 24 HOURS
Refills: 0 | Status: DISCONTINUED | OUTPATIENT
Start: 2023-12-05 | End: 2023-12-08

## 2023-12-04 RX ORDER — IPRATROPIUM/ALBUTEROL SULFATE 18-103MCG
3 AEROSOL WITH ADAPTER (GRAM) INHALATION EVERY 6 HOURS
Refills: 0 | Status: DISCONTINUED | OUTPATIENT
Start: 2023-12-04 | End: 2023-12-04

## 2023-12-04 RX ORDER — SODIUM CHLORIDE 9 MG/ML
1000 INJECTION, SOLUTION INTRAVENOUS
Refills: 0 | Status: DISCONTINUED | OUTPATIENT
Start: 2023-12-04 | End: 2023-12-08

## 2023-12-04 RX ORDER — OXYCODONE HYDROCHLORIDE 5 MG/1
1 TABLET ORAL
Refills: 0 | DISCHARGE

## 2023-12-04 RX ORDER — ACYCLOVIR SODIUM 500 MG
1 VIAL (EA) INTRAVENOUS
Refills: 0 | DISCHARGE

## 2023-12-04 RX ORDER — ACETAMINOPHEN 500 MG
1000 TABLET ORAL ONCE
Refills: 0 | Status: COMPLETED | OUTPATIENT
Start: 2023-12-04 | End: 2023-12-04

## 2023-12-04 RX ORDER — DEXTROSE 50 % IN WATER 50 %
12.5 SYRINGE (ML) INTRAVENOUS ONCE
Refills: 0 | Status: DISCONTINUED | OUTPATIENT
Start: 2023-12-04 | End: 2023-12-08

## 2023-12-04 RX ORDER — INSULIN LISPRO 100/ML
VIAL (ML) SUBCUTANEOUS
Refills: 0 | Status: DISCONTINUED | OUTPATIENT
Start: 2023-12-04 | End: 2023-12-08

## 2023-12-04 RX ORDER — GLUCAGON INJECTION, SOLUTION 0.5 MG/.1ML
1 INJECTION, SOLUTION SUBCUTANEOUS ONCE
Refills: 0 | Status: DISCONTINUED | OUTPATIENT
Start: 2023-12-04 | End: 2023-12-08

## 2023-12-04 RX ORDER — INSULIN LISPRO 100/ML
VIAL (ML) SUBCUTANEOUS AT BEDTIME
Refills: 0 | Status: DISCONTINUED | OUTPATIENT
Start: 2023-12-04 | End: 2023-12-08

## 2023-12-04 RX ORDER — ALBUTEROL 90 UG/1
4 AEROSOL, METERED ORAL
Refills: 0 | Status: DISCONTINUED | OUTPATIENT
Start: 2023-12-04 | End: 2023-12-04

## 2023-12-04 RX ORDER — IBUPROFEN 200 MG
1 TABLET ORAL
Refills: 0 | DISCHARGE

## 2023-12-04 RX ORDER — ALBUTEROL 90 UG/1
2 AEROSOL, METERED ORAL EVERY 6 HOURS
Refills: 0 | Status: DISCONTINUED | OUTPATIENT
Start: 2023-12-04 | End: 2023-12-08

## 2023-12-04 RX ORDER — ACETAMINOPHEN 500 MG
650 TABLET ORAL EVERY 6 HOURS
Refills: 0 | Status: DISCONTINUED | OUTPATIENT
Start: 2023-12-04 | End: 2023-12-08

## 2023-12-04 RX ORDER — FAMOTIDINE 10 MG/ML
1 INJECTION INTRAVENOUS
Refills: 0 | DISCHARGE

## 2023-12-04 RX ORDER — ENOXAPARIN SODIUM 100 MG/ML
40 INJECTION SUBCUTANEOUS EVERY 24 HOURS
Refills: 0 | Status: DISCONTINUED | OUTPATIENT
Start: 2023-12-04 | End: 2023-12-08

## 2023-12-04 RX ADMIN — Medication 125 MILLIGRAM(S): at 18:30

## 2023-12-04 RX ADMIN — Medication 400 MILLIGRAM(S): at 17:37

## 2023-12-04 NOTE — H&P ADULT - PROBLEM SELECTOR PLAN 5
Hx of MM (on weekly chemo/immunotherapy).   - follows with Dr. Agustin Block ([facility]) Hx of MM (on weekly chemo/immunotherapy).   - follows with Dr. Agustin Block ([facility])

## 2023-12-04 NOTE — H&P ADULT - PROBLEM SELECTOR PLAN 4
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 On admission, proBNP wnl and clinically euvolemic appearing.   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 On admission, proBNP wnl and clinically euvolemic appearing.   - not in active CHF exacerbation  - was started on ASA/statin last admission for "non-obstructive CAD" but [facility] (10/15/23) reported no disease; pt stated not taking theses medication at home, will continue to hold for now  - at home, not on previously prescribed valsartan and metoprolol/carvedilol due to drops in BP per family  - resume antihypertensives for GDMT if able to tolerate  - c/w home Farxiga

## 2023-12-04 NOTE — ED ADULT NURSE NOTE - NSFALLRISKINTERV_ED_ALL_ED
Assistance OOB with selected safe patient handling equipment if applicable/Assistance with ambulation/Communicate fall risk and risk factors to all staff, patient, and family/Provide visual cue: yellow wristband, yellow gown, etc/Reinforce activity limits and safety measures with patient and family/Call bell, personal items and telephone in reach/Instruct patient to call for assistance before getting out of bed/chair/stretcher/Non-slip footwear applied when patient is off stretcher/[place] to call system/Physically safe environment - no spills, clutter or unnecessary equipment/Purposeful Proactive Rounding/Room/bathroom lighting operational, light cord in reach Assistance OOB with selected safe patient handling equipment if applicable/Assistance with ambulation/Communicate fall risk and risk factors to all staff, patient, and family/Provide visual cue: yellow wristband, yellow gown, etc/Reinforce activity limits and safety measures with patient and family/Call bell, personal items and telephone in reach/Instruct patient to call for assistance before getting out of bed/chair/stretcher/Non-slip footwear applied when patient is off stretcher/[place] to call system/Physically safe environment - no spills, clutter or unnecessary equipment/Purposeful Proactive Rounding/Room/bathroom lighting operational, light cord in reach

## 2023-12-04 NOTE — ED PROVIDER NOTE - PHYSICAL EXAMINATION
PHYSICAL EXAM:  GENERAL: Sitting comfortable in bed, in no acute distress  HENMT: Atraumatic, moist mucous membranes, no oropharyngeal exudates or vesicles, uvula is midline EYES: Clear bilaterally, PERRL, EOMs intact b/l  HEART: RRR, S1/S2, no murmur/gallops/rubs  RESPIRATORY: Diffuse wheeze  bilaterally, no focal rales, no rhonchi, at rest, tachypneic to the mid 20s.  However with ambulation patient becomes tachypneic to the 30s with audible wheeze on standing next to stretcher O2 sat drops to 80s on room air with ambulation.  ABDOMEN: +BS, soft, nontender, nondistended, no rebound, no guarding  EXTREMITIES: No lower extremity edema, +2 radial pulses b/l  NEURO:  A&Ox4, no focal motor deficits or sensory deficits   Heme/LYMPH: No ecchymosis or bruising  SKIN:  Skin normal color, warm, dry and intact. No evidence of rash.

## 2023-12-04 NOTE — ED ADULT NURSE NOTE - OBJECTIVE STATEMENT
36 yo presents to the ED from [place]. A&Ox4, ambulatory with son in law at bedside translating for pt. pt primary language [facility]. pt reports feeling ill x 1 week, saw PMD 3 days ago, was started on azithro with no improvement. pt reports productive cough, SOB with exertion, weakness, fever Tmax 101 as per pt. pt denies any nausea, vomiting. reports normal PO intake. reports chest congestion denies acute pain. has been taking ibuprofen for symptom management. last dose was this AM 0800. history of multiple myeloma, on weekly injections, last chemo 2 years ago. 20G inserted RAC in triage. Patient undressed and placed into gown, call bell in hand and side rails up for safety. warm blanket provided, vital signs stable, pt in no acute distress. 36 yo presents to the ED from [place]. A&Ox4, ambulatory with son in law at bedside translating for pt. pt primary language [facility]. pt reports feeling ill x 1 week, saw PMD 3 days ago, was started on azithro with no improvement. pt reports productive cough, SOB with exertion, weakness, fever Tmax 101 as per pt. pt denies any nausea, vomiting. reports normal PO intake. reports chest congestion denies acute pain. has been taking ibuprofen for symptom management. last dose was this AM 0800. history of multiple myeloma, on weekly injections, last chemo 2 years ago. 20G inserted RAC in triage. Patient undressed and placed into gown, call bell in hand and side rails up for safety. warm blanket provided, vital signs stable, pt in no acute distress.

## 2023-12-04 NOTE — H&P ADULT - NSHPLABSRESULTS_GEN_ALL_CORE
LABS:                        10.6   9.98  )-----------( 104      ( 04 Dec 2023 16:21 )             33.7     12-04    138  |  101  |  12  ----------------------------<  126<H>  4.0   |  25  |  1.09    Ca    9.3      04 Dec 2023 16:21    TPro  6.9  /  Alb  3.6  /  TBili  0.4  /  DBili  x   /  AST  16  /  ALT  18  /  AlkPhos  69  12-04    PT/INR - ( 04 Dec 2023 16:21 )   PT: 11.4 sec;   INR: 1.09 ratio         PTT - ( 04 Dec 2023 16:21 )  PTT:28.4 sec      Urinalysis Basic - ( 04 Dec 2023 22:26 )    Color: Yellow / Appearance: Clear / S.010 / pH: x  Gluc: x / Ketone: Negative mg/dL  / Bili: Negative / Urobili: 0.2 mg/dL   Blood: x / Protein: Trace mg/dL / Nitrite: Negative   Leuk Esterase: Negative / RBC: x / WBC x   Sq Epi: x / Non Sq Epi: x / Bacteria: x      IMAGING/ADDITIONAL TESTS:    EKG (23) personally reviewed: NSR, HR 92, QTc 442    CXR (23) personally reviewed: no focal consolidation appreciated

## 2023-12-04 NOTE — H&P ADULT - PROBLEM SELECTOR PLAN 1
Ambulatory O2 sat dropped to 80s on ambulation in ED. Likely in setting of asthma exacerbation 2/2 RSV infection.  - CXR w/o focal consolidation, lower concern for PNA at this time, monitor off empiric abx  - monitor respiratory status, wean supplemental O2 as tolerated  - treat asthma exacerbation as below  - check repeat ambulatory O2 sat in AM  - f/u ABG

## 2023-12-04 NOTE — H&P ADULT - ASSESSMENT
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 Concern for AHRF, likely in setting of asthma exacerbation 2/2 RSV infection.

## 2023-12-04 NOTE — ED PROVIDER NOTE - OBJECTIVE STATEMENT
67-year-old male history significant for hypothyroidism, asthma, multiple myeloma presenting for shortness of breath, dyspnea on exertion, general malaise and fever Tmax 102 yesterday.  Otherwise no chest pain no leg pain no leg swelling no history of smoking no abdominal pain no nausea no vomiting no diarrhea no melena no medic easy no urinary symptoms.

## 2023-12-04 NOTE — ED ADULT NURSE NOTE - NS PRO AD NO ADVANCE DIRECTIVE
Continued Stay Note  [facility]     Patient Name: Haydee Goetz  MRN: [id number]  Today's Date: 2/19/2020    Admit Date: 2/15/2020    Discharge Plan     Row Name 02/19/20 1016       Plan    Plan  Plan return to [facility].     KAYE Donald RN    Patient/Family in Agreement with Plan  yes    Plan Comments  Spoke to pt at bedside.  Pt states plan is to return to [facility].  Jackie  ( [phone]) following.  Plan return to  [facility].   KAYE Valencia RN        Discharge Codes    No documentation.             Cesia Valencia, RN     No

## 2023-12-04 NOTE — ED PROVIDER NOTE - PROGRESS NOTE DETAILS
Clifford Pagan MD (PGY-4):  Patient resting comfortably in bed, satting in the mid 90s on room air.  With any ambulation patient drops to the 80s, becomes tachypneic and tachycardic and worsening wheeze.  No focal consolidation on x-ray.  Discussed with son-in-law at bedside who states that patient may have a history of asthma however he is unsure.  Discussed case with Dr. Grewal who will admit for symptomatic RSV

## 2023-12-04 NOTE — CHART NOTE - NSCHARTNOTEFT_GEN_A_CORE
Confidential Drug Utilization Report  Search Terms: Jovita Mijares, 1955 Search Date: 12/04/2023 21:48:21 PM  Searching on behalf of: 0541 - [facility]  The Drug Utilization Report below displays all of the controlled substance prescriptions, if any, that your patient has filled in the last twelve months. The information displayed on this report is compiled from pharmacy submissions to the Department, and accurately reflects the information as submitted by the pharmacies.    This report was requested by: Raimundo Menendez | Reference #: [id number]    Practitioner Count: 0  Pharmacy Count: 0  Current Opioid Prescriptions: 0  Current Benzodiazepine Prescriptions: 0  Current Stimulant Prescriptions: 0      Patient Demographic Information (PDI)       PDI	First Name	Last Name	Birth Date	Gender	Street Address	[facility]	Zip Code  A	Jovita Chávez	1955	Male	[phone] [facility] [facility]	[address]    Prescription Information      PDI Filter:    PDI	Current Rx	Drug Type	Rx Written	Rx Dispensed	Drug	Quantity	Days Supply	Prescriber Name	Prescriber DEE #	Payment Method	Dispenser  A	N	O	08/31/2023	08/31/2023	oxycodone hcl (ir) 5 mg tablet	56	14	Agustin Block M	[id number]	Medicaid	[id number]  A	N	O	07/25/2023	07/26/2023	oxycodone hcl (ir) 5 mg tablet	21	7	Agustin Block M	[id number]	Medicaid	[id number]    * - Details of Drug Type : O = Opioid, B = Benzodiazepine, S = Stimulant    * - Drugs marked with an asterisk are compound drugs. If the compound drug is made up of more than one controlled substance, then each controlled substance will be a separate row in the table.          † Click the ‘Report Suspicious Activity’ button to report information related to controlled substance suspicious activity to the [place] of Narcotic Enforcement.    †† Click the ‘Send Questions/Comments’ button to send questions about this report to the [place] of [employer]arcotic Enforcement, or call [phone].    ††† Click the ‘Substance Use Disorder Treatment’ button to go to the Office of Addiction Services and Supports website, www.[place].gov or call 1-473.379.6710.    © 2017 [facility] Department of Health -[place] of [place]arcotic Mbeplmyiulm45/04/2023 21:48  . Confidential Drug Utilization Report  Search Terms: Jovita Mijares, 1955 Search Date: 12/04/2023 21:48:21 PM  Searching on behalf of: 0541 - [facility]  The Drug Utilization Report below displays all of the controlled substance prescriptions, if any, that your patient has filled in the last twelve months. The information displayed on this report is compiled from pharmacy submissions to the Department, and accurately reflects the information as submitted by the pharmacies.    This report was requested by: Raimundo Menendez | Reference #: [id number]    Practitioner Count: 0  Pharmacy Count: 0  Current Opioid Prescriptions: 0  Current Benzodiazepine Prescriptions: 0  Current Stimulant Prescriptions: 0      Patient Demographic Information (PDI)       PDI	First Name	Last Name	Birth Date	Gender	Street Address	[facility]	Zip Code  A	Jovita Chávez	1955	Male	[phone] [facility]	[address]    Prescription Information      PDI Filter:    PDI	Current Rx	Drug Type	Rx Written	Rx Dispensed	Drug	Quantity	Days Supply	Prescriber Name	Prescriber DEE #	Payment Method	Dispenser  A	N	O	08/31/2023	08/31/2023	oxycodone hcl (ir) 5 mg tablet	56	14	Agustin Block M	[id number]	Medicaid	[id number]  A	N	O	07/25/2023	07/26/2023	oxycodone hcl (ir) 5 mg tablet	21	7	Agustin Block M	[id number]	Medicaid	[id number]    * - Details of Drug Type : O = Opioid, B = Benzodiazepine, S = Stimulant    * - Drugs marked with an asterisk are compound drugs. If the compound drug is made up of more than one controlled substance, then each controlled substance will be a separate row in the table.          † Click the ‘Report Suspicious Activity’ button to report information related to controlled substance suspicious activity to the [place] of Narcotic Enforcement.    †† Click the ‘Send Questions/Comments’ button to send questions about this report to the [place] of [employer]arcotic Enforcement, or call [phone].    ††† Click the ‘Substance Use Disorder Treatment’ button to go to the Office of Addiction Services and Supports website, www.[place].gov or call 1-614.343.5002.    © 2017 [facility] Department of Health -[place] of Narcotic Qkuyiwsnjbe30/04/2023 21:48  .

## 2023-12-04 NOTE — H&P ADULT - PROBLEM SELECTOR PLAN 3
Found to be RSV positive (12/4/23)  - c/w symptomatic management  - monitor respiratory status  - maintain isolation precautions

## 2023-12-04 NOTE — ED PROVIDER NOTE - RAPID ASSESSMENT
67M PMhx significant for  hypothyroidism and asthma, MM (on weekly injection) presenting for SOB. reports significant fatigue, waxing and waning fever to 102, reports so weak he cannot even walk. + cough, productive of phlegm, no blood. + VELAZCO, no dyspnea at rest. patient was started on abx (azithro) but was not improving after 3 days so was sent in. of note recent cath showed nonobstructive CAD. no abd pain/nausea/vomiting/diarrhea or urinary complaints. he notes hx of anemia requiring transfusions but states this fatigue is not similar in nature, no black or bloody stools.  sent in by Dr. Jimenez ([place] and [place])

## 2023-12-04 NOTE — H&P ADULT - NSHPOUTPATIENTPROVIDERS_GEN_ALL_CORE
Heme/Onc: Dr. Agustin Block ([facility]) Heme/Onc: Dr. Agustin Block ([facility])

## 2023-12-04 NOTE — H&P ADULT - NSHPPHYSICALEXAM_GEN_ALL_CORE
Vital Signs Last 24 Hrs  T(C): 37.2 (04 Dec 2023 22:06), Max: 37.7 (04 Dec 2023 17:15)  T(F): 99 (04 Dec 2023 22:06), Max: 99.8 (04 Dec 2023 17:15)  HR: 93 (04 Dec 2023 22:06) (93 - 103)  BP: 110/81 (04 Dec 2023 22:06) (109/73 - 133/72)  BP(mean): --  RR: 22 (04 Dec 2023 22:06) (18 - 22)  SpO2: 100% (04 Dec 2023 22:06) (95% - 100%)    Parameters below as of 04 Dec 2023 22:06  Patient On (Oxygen Delivery Method): nasal cannula  O2 Flow (L/min): 2      CONSTITUTIONAL: NAD, well-developed, well-groomed, laying in bed  EYES: PERRL; sclera clear  ENMT: Moist oral mucosa, no pharyngeal exudates  NECK: Supple, no palpable masses  RESPIRATORY: Mildly tachypneic/increased WOB; decreased breath sounds with intermittent mild wheezes; no rales appreciated  CARDIOVASCULAR: Regular rate and rhythm; Normal S1 and S2; No murmurs, rubs, or gallops; No lower extremity edema; Peripheral pulses are palpable bilaterally  ABDOMEN: Soft, Nontender, Nondistended; Bowel sounds present  MUSCULOSKELETAL:  No clubbing or cyanosis of digits; No joint swelling or tenderness to palpation  PSYCH: AAOx3 (oriented to person, place, and month/year); affect appropriate  NEUROLOGY: moving all extremities spontaneously; no gross sensory deficits  SKIN: No rashes; No palpable lesions

## 2023-12-04 NOTE — ED ADULT TRIAGE NOTE - CHIEF COMPLAINT QUOTE
fevers, cough has Multiple myeloma, was on meds for cough not getting better so sent to r/o pneumonia

## 2023-12-04 NOTE — H&P ADULT - NSHPSOCIALHISTORY_GEN_ALL_CORE
Denied smoking cigarettes, drinking EtOH, or using illicit/recreational drugs. Ambulates without assistive devices at baseline.

## 2023-12-04 NOTE — ED PROVIDER NOTE - ATTENDING CONTRIBUTION TO CARE
I performed a history and physical exam of the patient and discussed their management with the resident. I reviewed the resident's note and agree with the documented findings and plan of care.  Carolyn Sandoval MD

## 2023-12-04 NOTE — ED CLERICAL - NS ED CLERK NOTE PRE-ARRIVAL INFORMATION; ADDITIONAL PRE-ARRIVAL INFORMATION
CC/Reason For referral: pt requires Sepsis eval, and R/O pneumonia and bacteriemia. Hx of Multiple Myeloma; last treatment 11/24/23. persistent fevers, sore throat. Flu/Covid/Strep test NEG  Preferred Consultant(if applicable): N/A  Who admits for you (if needed): N/A  Do you have documents you would like to fax over? N/A  Would you still like to speak to an ED attending? yes, after pt is seen

## 2023-12-04 NOTE — H&P ADULT - PROBLEM SELECTOR PLAN 2
Hx of asthma, no on inhalers at home. Presented with worsening SOB/VELAZCO, decreased breath sounds and wheezing, as well as hypoxia on ambulation. Concern for asthma exacerbation 2/2 viral URI  - s/p solumedrol 125mg IVP in ED  - c/w prednisone 40mg daily x5 days  - c/w duonebs q6h for now

## 2023-12-04 NOTE — H&P ADULT - HISTORY OF PRESENT ILLNESS
67M w/ hx of MM (on chemo/immunotherapy), hypothyroidism, asthma, NICM/HFrEF 2/2 chemo, presenting with SOB/VELAZCO, productive cough, fevers, chills, and severe fatigue x3 days. Reported Tmax of 102F at home yesterday. Was taking azithromycin outpatient without improvement and so was sent in to the hospital. Denied n/v, CP, abdominal pain, dysuria, hematuria, hematochezia, melena, diarrhea, constipation.    In ED: Tmax 99.8F oral, HR 100s, SBP 100s-130s, RR 18-20, sating 95-96% on RA. However, on ambulation, O2 sat dropped to 80s and worsened wheeze, tachypnea, and tachycardia. RSV positive. CXR w/o focal consolidation. Received ofirmev 1g and solumedrol 125mg. Admitted to Medicine for further management.   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 Reported Tmax of 102F at home yesterday. Was taking azithromycin outpatient without improvement and so was sent in to the hospital. Denied n/v, CP, abdominal pain, dysuria, hematuria, hematochezia, melena, diarrhea, constipation.    In ED: Tmax 99.8F oral, HR 100s, SBP 100s-130s, RR 18-20, sating 95-96% on RA. However, per ED documentation, ambulatory O2 sat dropped to 80s and associated with worsened wheeze, tachypnea, and tachycardia. RSV positive. CXR w/o focal consolidation. Received ofirmev 1g and solumedrol 125mg. Admitted to Medicine for further management. 67M Julisa-speaking w/ hx of MM (on weekly chemo/immunotherapy), 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), hypothyroidism, asthma, presenting with SOB/VELAZCO, productive cough, fevers, chills, and severe fatigue for the past ~1 wk. Reported Tmax of 102F at home yesterday. Was taking ibuprofen PRN at home, but fevers would quickly return. Also was taking azithromycin at home for the past 3 days without improvement and so was sent to the hospital. Denied n/v, runny nose, CP, abdominal pain, dysuria, hematuria, hematochezia, melena, diarrhea, constipation, recent travel, sick contacts. Of note, pt was started on several medications as GDMT for his CHF last admission in 10/2023, but son-in-law noted that he was taken off the antihypertensives because his blood pressure would drop to 70s.    In ED: Tmax 99.8F oral, HR 100s, SBP 100s-130s, RR 18-20, sating 95-96% on RA. However, per ED documentation, ambulatory O2 sat dropped to 80s and associated with worsened wheeze, tachypnea, and tachycardia. RSV positive. CXR w/o focal consolidation. Received ofirmev 1g and solumedrol 125mg. Admitted to Medicine for further management.

## 2023-12-04 NOTE — ED PROVIDER NOTE - CLINICAL SUMMARY MEDICAL DECISION MAKING FREE TEXT BOX
Clifford Pagan MD (PGY-4):  65-year-old male with above past medical history presenting with fevers, chills, shortness of breath, dyspnea on exertion.  Found to be RSV positive.  BNP, troponin ordered by me as they were not ordered with initial Q doc evaluation.  With diffuse wheeze likely viral URI and asthma exacerbation.  We will treat as such with DuoNebs, steroid and reassess.  Other etiologies not excluded.  Less likely PE given source with positive RSV and no chest pain, no hypoxia at rest.  If cardiac markers elevated will reconsider PE eval

## 2023-12-05 LAB
ALBUMIN SERPL ELPH-MCNC: 3.4 G/DL — SIGNIFICANT CHANGE UP (ref 3.3–5)
ALBUMIN SERPL ELPH-MCNC: 3.4 G/DL — SIGNIFICANT CHANGE UP (ref 3.3–5)
ALP SERPL-CCNC: 70 U/L — SIGNIFICANT CHANGE UP (ref 40–120)
ALP SERPL-CCNC: 70 U/L — SIGNIFICANT CHANGE UP (ref 40–120)
ALT FLD-CCNC: 16 U/L — SIGNIFICANT CHANGE UP (ref 10–45)
ALT FLD-CCNC: 16 U/L — SIGNIFICANT CHANGE UP (ref 10–45)
ANION GAP SERPL CALC-SCNC: 13 MMOL/L — SIGNIFICANT CHANGE UP (ref 5–17)
ANION GAP SERPL CALC-SCNC: 13 MMOL/L — SIGNIFICANT CHANGE UP (ref 5–17)
ANISOCYTOSIS BLD QL: SLIGHT — SIGNIFICANT CHANGE UP
ANISOCYTOSIS BLD QL: SLIGHT — SIGNIFICANT CHANGE UP
AST SERPL-CCNC: 14 U/L — SIGNIFICANT CHANGE UP (ref 10–40)
AST SERPL-CCNC: 14 U/L — SIGNIFICANT CHANGE UP (ref 10–40)
BASOPHILS # BLD AUTO: 0 K/UL — SIGNIFICANT CHANGE UP (ref 0–0.2)
BASOPHILS # BLD AUTO: 0 K/UL — SIGNIFICANT CHANGE UP (ref 0–0.2)
BASOPHILS NFR BLD AUTO: 0 % — SIGNIFICANT CHANGE UP (ref 0–2)
BASOPHILS NFR BLD AUTO: 0 % — SIGNIFICANT CHANGE UP (ref 0–2)
BILIRUB SERPL-MCNC: 0.3 MG/DL — SIGNIFICANT CHANGE UP (ref 0.2–1.2)
BILIRUB SERPL-MCNC: 0.3 MG/DL — SIGNIFICANT CHANGE UP (ref 0.2–1.2)
BUN SERPL-MCNC: 17 MG/DL — SIGNIFICANT CHANGE UP (ref 7–23)
BUN SERPL-MCNC: 17 MG/DL — SIGNIFICANT CHANGE UP (ref 7–23)
CALCIUM SERPL-MCNC: 9.4 MG/DL — SIGNIFICANT CHANGE UP (ref 8.4–10.5)
CALCIUM SERPL-MCNC: 9.4 MG/DL — SIGNIFICANT CHANGE UP (ref 8.4–10.5)
CHLORIDE SERPL-SCNC: 102 MMOL/L — SIGNIFICANT CHANGE UP (ref 96–108)
CHLORIDE SERPL-SCNC: 102 MMOL/L — SIGNIFICANT CHANGE UP (ref 96–108)
CO2 SERPL-SCNC: 22 MMOL/L — SIGNIFICANT CHANGE UP (ref 22–31)
CO2 SERPL-SCNC: 22 MMOL/L — SIGNIFICANT CHANGE UP (ref 22–31)
CREAT SERPL-MCNC: 0.94 MG/DL — SIGNIFICANT CHANGE UP (ref 0.5–1.3)
CREAT SERPL-MCNC: 0.94 MG/DL — SIGNIFICANT CHANGE UP (ref 0.5–1.3)
DACRYOCYTES BLD QL SMEAR: SLIGHT — SIGNIFICANT CHANGE UP
DACRYOCYTES BLD QL SMEAR: SLIGHT — SIGNIFICANT CHANGE UP
EGFR: 89 ML/MIN/1.73M2 — SIGNIFICANT CHANGE UP
EGFR: 89 ML/MIN/1.73M2 — SIGNIFICANT CHANGE UP
EOSINOPHIL # BLD AUTO: 0 K/UL — SIGNIFICANT CHANGE UP (ref 0–0.5)
EOSINOPHIL # BLD AUTO: 0 K/UL — SIGNIFICANT CHANGE UP (ref 0–0.5)
EOSINOPHIL NFR BLD AUTO: 0 % — SIGNIFICANT CHANGE UP (ref 0–6)
EOSINOPHIL NFR BLD AUTO: 0 % — SIGNIFICANT CHANGE UP (ref 0–6)
GAS PNL BLDA: SIGNIFICANT CHANGE UP
GAS PNL BLDA: SIGNIFICANT CHANGE UP
GLUCOSE BLDC GLUCOMTR-MCNC: 128 MG/DL — HIGH (ref 70–99)
GLUCOSE BLDC GLUCOMTR-MCNC: 128 MG/DL — HIGH (ref 70–99)
GLUCOSE BLDC GLUCOMTR-MCNC: 148 MG/DL — HIGH (ref 70–99)
GLUCOSE BLDC GLUCOMTR-MCNC: 148 MG/DL — HIGH (ref 70–99)
GLUCOSE BLDC GLUCOMTR-MCNC: 152 MG/DL — HIGH (ref 70–99)
GLUCOSE BLDC GLUCOMTR-MCNC: 152 MG/DL — HIGH (ref 70–99)
GLUCOSE BLDC GLUCOMTR-MCNC: 193 MG/DL — HIGH (ref 70–99)
GLUCOSE BLDC GLUCOMTR-MCNC: 193 MG/DL — HIGH (ref 70–99)
GLUCOSE BLDC GLUCOMTR-MCNC: 215 MG/DL — HIGH (ref 70–99)
GLUCOSE BLDC GLUCOMTR-MCNC: 215 MG/DL — HIGH (ref 70–99)
GLUCOSE SERPL-MCNC: 199 MG/DL — HIGH (ref 70–99)
GLUCOSE SERPL-MCNC: 199 MG/DL — HIGH (ref 70–99)
HCT VFR BLD CALC: 31.6 % — LOW (ref 39–50)
HCT VFR BLD CALC: 31.6 % — LOW (ref 39–50)
HGB BLD-MCNC: 9.7 G/DL — LOW (ref 13–17)
HGB BLD-MCNC: 9.7 G/DL — LOW (ref 13–17)
LYMPHOCYTES # BLD AUTO: 1.79 K/UL — SIGNIFICANT CHANGE UP (ref 1–3.3)
LYMPHOCYTES # BLD AUTO: 1.79 K/UL — SIGNIFICANT CHANGE UP (ref 1–3.3)
LYMPHOCYTES # BLD AUTO: 27.3 % — SIGNIFICANT CHANGE UP (ref 13–44)
LYMPHOCYTES # BLD AUTO: 27.3 % — SIGNIFICANT CHANGE UP (ref 13–44)
MACROCYTES BLD QL: SLIGHT — SIGNIFICANT CHANGE UP
MACROCYTES BLD QL: SLIGHT — SIGNIFICANT CHANGE UP
MAGNESIUM SERPL-MCNC: 2.4 MG/DL — SIGNIFICANT CHANGE UP (ref 1.6–2.6)
MAGNESIUM SERPL-MCNC: 2.4 MG/DL — SIGNIFICANT CHANGE UP (ref 1.6–2.6)
MANUAL SMEAR VERIFICATION: SIGNIFICANT CHANGE UP
MANUAL SMEAR VERIFICATION: SIGNIFICANT CHANGE UP
MCHC RBC-ENTMCNC: 29.4 PG — SIGNIFICANT CHANGE UP (ref 27–34)
MCHC RBC-ENTMCNC: 29.4 PG — SIGNIFICANT CHANGE UP (ref 27–34)
MCHC RBC-ENTMCNC: 30.7 GM/DL — LOW (ref 32–36)
MCHC RBC-ENTMCNC: 30.7 GM/DL — LOW (ref 32–36)
MCV RBC AUTO: 95.8 FL — SIGNIFICANT CHANGE UP (ref 80–100)
MCV RBC AUTO: 95.8 FL — SIGNIFICANT CHANGE UP (ref 80–100)
METAMYELOCYTES # FLD: 0.9 % — HIGH (ref 0–0)
METAMYELOCYTES # FLD: 0.9 % — HIGH (ref 0–0)
MONOCYTES # BLD AUTO: 0.06 K/UL — SIGNIFICANT CHANGE UP (ref 0–0.9)
MONOCYTES # BLD AUTO: 0.06 K/UL — SIGNIFICANT CHANGE UP (ref 0–0.9)
MONOCYTES NFR BLD AUTO: 0.9 % — LOW (ref 2–14)
MONOCYTES NFR BLD AUTO: 0.9 % — LOW (ref 2–14)
MYELOCYTES NFR BLD: 0.9 % — HIGH (ref 0–0)
MYELOCYTES NFR BLD: 0.9 % — HIGH (ref 0–0)
NEUTROPHILS # BLD AUTO: 4.6 K/UL — SIGNIFICANT CHANGE UP (ref 1.8–7.4)
NEUTROPHILS # BLD AUTO: 4.6 K/UL — SIGNIFICANT CHANGE UP (ref 1.8–7.4)
NEUTROPHILS NFR BLD AUTO: 69.1 % — SIGNIFICANT CHANGE UP (ref 43–77)
NEUTROPHILS NFR BLD AUTO: 69.1 % — SIGNIFICANT CHANGE UP (ref 43–77)
NEUTS BAND # BLD: 0.9 % — SIGNIFICANT CHANGE UP (ref 0–8)
NEUTS BAND # BLD: 0.9 % — SIGNIFICANT CHANGE UP (ref 0–8)
OVALOCYTES BLD QL SMEAR: SLIGHT — SIGNIFICANT CHANGE UP
OVALOCYTES BLD QL SMEAR: SLIGHT — SIGNIFICANT CHANGE UP
PHOSPHATE SERPL-MCNC: 3 MG/DL — SIGNIFICANT CHANGE UP (ref 2.5–4.5)
PHOSPHATE SERPL-MCNC: 3 MG/DL — SIGNIFICANT CHANGE UP (ref 2.5–4.5)
PLAT MORPH BLD: ABNORMAL
PLAT MORPH BLD: ABNORMAL
PLATELET # BLD AUTO: 103 K/UL — LOW (ref 150–400)
PLATELET # BLD AUTO: 103 K/UL — LOW (ref 150–400)
POIKILOCYTOSIS BLD QL AUTO: SLIGHT — SIGNIFICANT CHANGE UP
POIKILOCYTOSIS BLD QL AUTO: SLIGHT — SIGNIFICANT CHANGE UP
POLYCHROMASIA BLD QL SMEAR: SLIGHT — SIGNIFICANT CHANGE UP
POLYCHROMASIA BLD QL SMEAR: SLIGHT — SIGNIFICANT CHANGE UP
POTASSIUM SERPL-MCNC: 4.8 MMOL/L — SIGNIFICANT CHANGE UP (ref 3.5–5.3)
POTASSIUM SERPL-MCNC: 4.8 MMOL/L — SIGNIFICANT CHANGE UP (ref 3.5–5.3)
POTASSIUM SERPL-SCNC: 4.8 MMOL/L — SIGNIFICANT CHANGE UP (ref 3.5–5.3)
POTASSIUM SERPL-SCNC: 4.8 MMOL/L — SIGNIFICANT CHANGE UP (ref 3.5–5.3)
PROT SERPL-MCNC: 6.9 G/DL — SIGNIFICANT CHANGE UP (ref 6–8.3)
PROT SERPL-MCNC: 6.9 G/DL — SIGNIFICANT CHANGE UP (ref 6–8.3)
RBC # BLD: 3.3 M/UL — LOW (ref 4.2–5.8)
RBC # BLD: 3.3 M/UL — LOW (ref 4.2–5.8)
RBC # FLD: 16 % — HIGH (ref 10.3–14.5)
RBC # FLD: 16 % — HIGH (ref 10.3–14.5)
RBC BLD AUTO: ABNORMAL
RBC BLD AUTO: ABNORMAL
SMUDGE CELLS # BLD: PRESENT — SIGNIFICANT CHANGE UP
SMUDGE CELLS # BLD: PRESENT — SIGNIFICANT CHANGE UP
SODIUM SERPL-SCNC: 137 MMOL/L — SIGNIFICANT CHANGE UP (ref 135–145)
SODIUM SERPL-SCNC: 137 MMOL/L — SIGNIFICANT CHANGE UP (ref 135–145)
WBC # BLD: 6.57 K/UL — SIGNIFICANT CHANGE UP (ref 3.8–10.5)
WBC # BLD: 6.57 K/UL — SIGNIFICANT CHANGE UP (ref 3.8–10.5)
WBC # FLD AUTO: 6.57 K/UL — SIGNIFICANT CHANGE UP (ref 3.8–10.5)
WBC # FLD AUTO: 6.57 K/UL — SIGNIFICANT CHANGE UP (ref 3.8–10.5)

## 2023-12-05 PROCEDURE — 71250 CT THORAX DX C-: CPT | Mod: 26

## 2023-12-05 RX ADMIN — Medication 3 MILLILITER(S): at 02:40

## 2023-12-05 RX ADMIN — Medication 3 MILLILITER(S): at 18:08

## 2023-12-05 RX ADMIN — Medication 50 MICROGRAM(S): at 06:23

## 2023-12-05 RX ADMIN — Medication 3 MILLILITER(S): at 23:01

## 2023-12-05 RX ADMIN — DAPAGLIFLOZIN 10 MILLIGRAM(S): 10 TABLET, FILM COATED ORAL at 07:39

## 2023-12-05 RX ADMIN — Medication 3 MILLILITER(S): at 06:23

## 2023-12-05 RX ADMIN — SENNA PLUS 2 TABLET(S): 8.6 TABLET ORAL at 23:02

## 2023-12-05 RX ADMIN — LORATADINE 10 MILLIGRAM(S): 10 TABLET ORAL at 12:15

## 2023-12-05 RX ADMIN — FAMOTIDINE 40 MILLIGRAM(S): 10 INJECTION INTRAVENOUS at 12:11

## 2023-12-05 RX ADMIN — Medication 4: at 07:39

## 2023-12-05 RX ADMIN — Medication 40 MILLIGRAM(S): at 06:22

## 2023-12-05 RX ADMIN — ENOXAPARIN SODIUM 40 MILLIGRAM(S): 100 INJECTION SUBCUTANEOUS at 06:23

## 2023-12-05 NOTE — ED ADULT NURSE REASSESSMENT NOTE - NS ED NURSE REASSESS COMMENT FT1
Report received from ABBY Sanchez . Pt AAOx4, NAD, resp nonlabored, skin warm/dry, resting comfortably in bed with family at bedside. Pt presented to ED c/o SOB. Pt denies headache, dizziness, chest pain, palpitations, SOB, abd pain, n/v/d, urinary symptoms, fevers, chills, weakness at this time. Pt RSV+ and isolation precautions are in place. Pt is well appearing and in no acute distress.  Pt awaiting bed assignment . Safety maintained with call bell within reach.

## 2023-12-05 NOTE — CONSULT NOTE ADULT - ASSESSMENT
EKG SR no ischemic changes     Echo < from: TTE W or WO Ultrasound Enhancing Agent (10.13.23 @ 15:23) >   1. Left ventricular systolic function is mildly decreased with an ejection fraction of 47 % by Frances's method of disks. Regional wall motion abnormalities consistent with ischemic heart disease.   2. Normal right ventricular cavity size and systolic function. Tricuspid annular plane systolic excursion (TAPSE) is 1.9 cm (normal >=1.7 cm). Tricuspid annular tissue Doppler S' is 11.0 cm/s (normal >10 cm/s).   3. Mildmitral regurgitation.   4. Estimated pulmonary artery systolic pressure is 41 mmHg, consistent with mild pulmonary hypertension.   5. Mild to moderate tricuspid regurgitation.   6. Trace pulmonic regurgitation.   7. No pericardial effusion seen.    < end of copied text >      Assessment and Plan     1) SOB:  2/2 RSV inf. on abx  improving   T/t per pulm     2) Mild LV dysfunction:  Euvolemic on exam   hold any diuresis     3) DVT PPX lovenox      3)

## 2023-12-05 NOTE — PROGRESS NOTE ADULT - SUBJECTIVE AND OBJECTIVE BOX
Name of Patient : CARMEN BERNSTEIN  MRN: [id number]  Date of visit: 12-05-23       Subjective: Patient seen and examined. No new events except as noted.     REVIEW OF SYSTEMS:    CONSTITUTIONAL: No weakness, fevers or chills  EYES/ENT: No visual changes;  No vertigo or throat pain   NECK: No pain or stiffness  RESPIRATORY: No cough, wheezing, hemoptysis; No shortness of breath  CARDIOVASCULAR: No chest pain or palpitations  GASTROINTESTINAL: No abdominal or epigastric pain. No nausea, vomiting, or hematemesis; No diarrhea or constipation. No melena or hematochezia.  GENITOURINARY: No dysuria, frequency or hematuria  NEUROLOGICAL: No numbness or weakness  SKIN: No itching, burning, rashes, or lesions   All other review of systems is negative unless indicated above.    MEDICATIONS:  MEDICATIONS  (STANDING):  albuterol    90 MICROgram(s) HFA Inhaler 2 Puff(s) Inhalation every 6 hours  albuterol/ipratropium for Nebulization 3 milliLiter(s) Nebulizer every 6 hours  dapagliflozin 10 milliGRAM(s) Oral every 24 hours  dextrose 5%. 1000 milliLiter(s) (50 mL/Hr) IV Continuous <Continuous>  dextrose 5%. 1000 milliLiter(s) (100 mL/Hr) IV Continuous <Continuous>  dextrose 50% Injectable 25 Gram(s) IV Push once  dextrose 50% Injectable 25 Gram(s) IV Push once  dextrose 50% Injectable 12.5 Gram(s) IV Push once  enoxaparin Injectable 40 milliGRAM(s) SubCutaneous every 24 hours  famotidine    Tablet 40 milliGRAM(s) Oral daily  glucagon  Injectable 1 milliGRAM(s) IntraMuscular once  insulin lispro (ADMELOG) corrective regimen sliding scale   SubCutaneous three times a day before meals  insulin lispro (ADMELOG) corrective regimen sliding scale   SubCutaneous at bedtime  levothyroxine 50 MICROGram(s) Oral daily  loratadine 10 milliGRAM(s) Oral daily  predniSONE   Tablet 40 milliGRAM(s) Oral daily  senna 2 Tablet(s) Oral at bedtime      PHYSICAL EXAM:  T(C): 36.7 (12-05-23 @ 22:02), Max: 36.9 (12-05-23 @ 09:03)  HR: 94 (12-05-23 @ 22:02) (72 - 94)  BP: 123/82 (12-05-23 @ 22:02) (108/72 - 134/84)  RR: 18 (12-05-23 @ 22:02) (18 - 28)  SpO2: 96% (12-05-23 @ 22:02) (95% - 100%)  Wt(kg): --  I&O's Summary        Appearance: Normal	  HEENT:  PERRLA   Lymphatic: No lymphadenopathy   Cardiovascular: Normal S1 S2, no JVD  Respiratory: normal effort , clear  Gastrointestinal:  Soft, Non-tender  Skin: No rashes,  warm to touch  Psychiatry:  Mood & affect appropriate  Musculuskeletal: No edema    recent labs, Imaging and EKGs personally reviewed                    Name of Patient : CARMEN BERNSTEIN  MRN: [id number]  Date of visit: 12-05-23       Subjective: Patient seen and examined. No new events except as noted.     REVIEW OF SYSTEMS:    CONSTITUTIONAL: No weakness, fevers or chills  EYES/ENT: No visual changes;  No vertigo or throat pain   NECK: No pain or stiffness  RESPIRATORY: No cough, wheezing, hemoptysis; No shortness of breath  CARDIOVASCULAR: No chest pain or palpitations  GASTROINTESTINAL: No abdominal or epigastric pain. No nausea, vomiting, or hematemesis; No diarrhea or constipation. No melena or hematochezia.  GENITOURINARY: No dysuria, frequency or hematuria  NEUROLOGICAL: No numbness or weakness  SKIN: No itching, burning, rashes, or lesions   All other review of systems is negative unless indicated above.    MEDICATIONS:  MEDICATIONS  (STANDING):  albuterol    90 MICROgram(s) HFA Inhaler 2 Puff(s) Inhalation every 6 hours  albuterol/ipratropium for Nebulization 3 milliLiter(s) Nebulizer every 6 hours  dapagliflozin 10 milliGRAM(s) Oral every 24 hours  dextrose 5%. 1000 milliLiter(s) (50 mL/Hr) IV Continuous <Continuous>  dextrose 5%. 1000 milliLiter(s) (100 mL/Hr) IV Continuous <Continuous>  dextrose 50% Injectable 25 Gram(s) IV Push once  dextrose 50% Injectable 25 Gram(s) IV Push once  dextrose 50% Injectable 12.5 Gram(s) IV Push once  enoxaparin Injectable 40 milliGRAM(s) SubCutaneous every 24 hours  famotidine    Tablet 40 milliGRAM(s) Oral daily  glucagon  Injectable 1 milliGRAM(s) IntraMuscular once  insulin lispro (ADMELOG) corrective regimen sliding scale   SubCutaneous three times a day before meals  insulin lispro (ADMELOG) corrective regimen sliding scale   SubCutaneous at bedtime  levothyroxine 50 MICROGram(s) Oral daily  loratadine 10 milliGRAM(s) Oral daily  predniSONE   Tablet 40 milliGRAM(s) Oral daily  senna 2 Tablet(s) Oral at bedtime      PHYSICAL EXAM:  T(C): 36.7 (12-05-23 @ 22:02), Max: 36.9 (12-05-23 @ 09:03)  HR: 94 (12-05-23 @ 22:02) (72 - 94)  BP: 123/82 (12-05-23 @ 22:02) (108/72 - 134/84)  RR: 18 (12-05-23 @ 22:02) (18 - 28)  SpO2: 96% (12-05-23 @ 22:02) (95% - 100%)  Wt(kg): --  I&O's Summary        Appearance: Normal	  HEENT:  PERRLA   Lymphatic: No lymphadenopathy   Cardiovascular: Normal S1 S2, no JVD  Respiratory: normal effort , clear  Gastrointestinal:  Soft, Non-tender  Skin: No rashes,  warm to touch  Psychiatry:  Mood & affect appropriate  Musculuskeletal: No edema    recent labs, Imaging and EKGs personally reviewed

## 2023-12-05 NOTE — PROGRESS NOTE ADULT - PROBLEM SELECTOR PLAN 1
Ambulatory O2 sat dropped to 80s on ambulation in ED. Likely in setting of asthma exacerbation 2/2 RSV infection.  - CXR w/o focal consolidation, lower concern for PNA at this time, monitor off empiric abx  - monitor respiratory status, wean supplemental O2 as tolerated  - treat asthma exacerbation as below  - f/u ABG  - Pulm eval called

## 2023-12-05 NOTE — PATIENT PROFILE ADULT - NSPRESCRALCSIXMORE_GEN_A_NUR
Thank you for choosing [employer] and [facility] for your     care. You may receive a survey about your visit. We appreciate you taking time     to complete this survey as we use your feedback to improve our services. We     realize we are not perfect, but we strive to provide excellent care.
Never

## 2023-12-05 NOTE — ED ADULT TRIAGE NOTE - ACCOMPANIED BY
for activities related to strengthening, flexibility, endurance, ROM performed to prevent loss of range of motion, maintain or improve muscular strength or increase flexibility, following either an injury or surgery. (16676) [facility] - Reviewed/Progressed HEP activities related to strengthening, flexibility, endurance, ROM performed to prevent loss of range of motion, maintain or improve muscular strength or increase flexibility, following either an injury or surgery. (64989) NEUROMUSCULAR RE-EDUCATION - Therapeutic procedure, 1 or more areas, each 15 minutes; neuromuscular reeducation of movement, balance, coordination, kinesthetic sense, posture, and/or proprioception for sitting and/or standing activities  (20056) [facility] - Reviewed/Progressed HEP activities related to neuromuscular reeducation of movement, balance, coordination, kinesthetic sense, posture, and/or proprioception for sitting and/or standing activities    (93289) MANUAL THERAPY -  Manual therapy techniques, 1 or more regions, each 15 minutes (Mobilization/manipulation, manual lymphatic drainage, manual traction) for the purpose of modulating pain, promoting relaxation,  increasing ROM, reducing/eliminating soft tissue swelling/inflammation/restriction, improving soft tissue extensibility and allowing for proper ROM for normal function with self care, mobility, lifting and ambulation  (54305) VASOPNEUMATIC    TREATMENT PLAN   Plan: POC Initiated today- see eval for details    Electronically Signed by Jer Woodruff PT              Date: 12/05/2023     Note: If patient does not return for scheduled/recommended follow up visits, this note will serve as a discharge from care along with the most recent update on progress.
Self

## 2023-12-05 NOTE — CONSULT NOTE ADULT - SUBJECTIVE AND OBJECTIVE BOX
Ino Davison MD  Interventional Cardiology / Endovascular Specialist  [place] Office : [address] [place] [address] [place] [address]  Tel:   [place] Office : [address] [facility] [place] 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), developed cough about a week ago, was given azithro which he took for 3 days with no improvement and became febrile so came to the hospital, he has had VELAZCO for 1-2 months now worse, cough is also productive   here pt afebrile, tachypneic requiring O2, no WBC  RSV positive  chest CT: multifocal pneumonia, also new pneumobilia and gas within gall bladder  blood and urine cx negative    PAST MEDICAL & SURGICAL HISTORY:  Hypothyroidism      Multiple myeloma      Asthma      No significant past surgical history          SOCIAL HISTORY: Substance Use (street drugs): ( x ) never used  (  ) other:    FAMILY HISTORY:  FHx: leukemia        REVIEW OF SYSTEMS:  CONSTITUTIONAL: No fever, weight loss, or fatigue  EYES: No eye pain, visual disturbances, or discharge  ENMT:  No difficulty hearing, tinnitus, vertigo; No sinus or throat pain  BREASTS: No pain, masses, or nipple discharge  GASTROINTESTINAL: No abdominal or epigastric pain. No nausea, vomiting, or hematemesis; No diarrhea or constipation. No melena or hematochezia.  GENITOURINARY: No dysuria, frequency, hematuria, or incontinence  NEUROLOGICAL: No headaches, memory loss, loss of strength, numbness, or tremors  ENDOCRINE: No heat or cold intolerance; No hair loss  MUSCULOSKELETAL: No joint pain or swelling; No muscle, back, or extremity pain  PSYCHIATRIC: No depression, anxiety, mood swings, or difficulty sleeping  HEME/LYMPH: No easy bruising, or bleeding gums  All others negative    MEDICATIONS:  enoxaparin Injectable 40 milliGRAM(s) SubCutaneous every 24 hours    piperacillin/tazobactam IVPB.- 3.375 Gram(s) IV Intermittent once    albuterol    90 MICROgram(s) HFA Inhaler 2 Puff(s) Inhalation every 6 hours  albuterol/ipratropium for Nebulization 3 milliLiter(s) Nebulizer every 6 hours  benzonatate 100 milliGRAM(s) Oral every 8 hours  guaiFENesin Oral Liquid (Sugar-Free) 200 milliGRAM(s) Oral every 6 hours PRN  loratadine 10 milliGRAM(s) Oral daily    acetaminophen     Tablet .. 650 milliGRAM(s) Oral every 6 hours PRN    famotidine    Tablet 40 milliGRAM(s) Oral daily  pantoprazole    Tablet 40 milliGRAM(s) Oral before breakfast  senna 2 Tablet(s) Oral at bedtime    dapagliflozin 10 milliGRAM(s) Oral every 24 hours  dextrose 50% Injectable 25 Gram(s) IV Push once  dextrose 50% Injectable 12.5 Gram(s) IV Push once  dextrose 50% Injectable 25 Gram(s) IV Push once  dextrose Oral Gel 15 Gram(s) Oral once PRN  glucagon  Injectable 1 milliGRAM(s) IntraMuscular once  insulin lispro (ADMELOG) corrective regimen sliding scale   SubCutaneous three times a day before meals  insulin lispro (ADMELOG) corrective regimen sliding scale   SubCutaneous at bedtime  levothyroxine 50 MICROGram(s) Oral daily  predniSONE   Tablet 40 milliGRAM(s) Oral daily    dextrose 5%. 1000 milliLiter(s) IV Continuous <Continuous>  dextrose 5%. 1000 milliLiter(s) IV Continuous <Continuous>  multivitamin 1 Tablet(s) Oral daily      FAMILY HISTORY:  FHx: leukemia          Allergies    sulfa drugs (Unknown)    Intolerances    	      PHYSICAL EXAM:  T(C): 36.6 (12-06-23 @ 13:03), Max: 36.9 (12-06-23 @ 04:46)  HR: 91 (12-06-23 @ 13:03) (91 - 97)  BP: 122/81 (12-06-23 @ 13:03) (105/71 - 123/82)  RR: 18 (12-06-23 @ 13:03) (18 - 19)  SpO2: 94% (12-06-23 @ 13:03) (94% - 97%)  Wt(kg): --  I&O's Summary    06 Dec 2023 07:01  -  06 Dec 2023 15:42  --------------------------------------------------------  IN: 0 mL / OUT: 1000 mL / NET: -1000 mL        GENERAL: NAD febrile   EYES: conjunctiva and sclera clear  ENMT: No tonsillar erythema, exudates, or enlargement   Cardiovascular: Normal S1 S2, No JVD, No murmurs  Respiratory: B/L basal ronchi   Gastrointestinal:  Soft, Non-tender, + BS	  Extremities: No edema      LABS:	 	    CARDIAC MARKERS:                                  9.7    6.57  )-----------( 103      ( 05 Dec 2023 07:00 )             31.6     12-05    137  |  102  |  17  ----------------------------<  199<H>  4.8   |  22  |  0.94    Ca    9.4      05 Dec 2023 07:00  Phos  3.0     12-05  Mg     2.4     12-05    TPro  6.9  /  Alb  3.4  /  TBili  0.3  /  DBili  x   /  AST  14  /  ALT  16  /  AlkPhos  70  12-05    proBNP:   Lipid Profile:   HgA1c:   TSH:     Consultant(s) Notes Reviewed:  [x ] YES  [ ] NO    Care Discussed with Consultants/Other Providers [ x] YES  [ ] NO    Imaging Personally Reviewed independently:  [x] YES  [ ] NO    All labs, radiologic studies, vitals, orders and medications list reviewed. Patient is seen and examined at bedside. Case discussed with medical team.    ASSESSMENT/PLAN: 	   Ino Davison MD  Interventional Cardiology / Endovascular Specialist  [place] Office : [address] [place] [address] [place] [address]  Tel:   [place] Office : [address] [facility] [place] 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), developed cough about a week ago, was given azithro which he took for 3 days with no improvement and became febrile so came to the hospital, he has had VELAZCO for 1-2 months now worse, cough is also productive   here pt afebrile, tachypneic requiring O2, no WBC  RSV positive  chest CT: multifocal pneumonia, also new pneumobilia and gas within gall bladder  blood and urine cx negative    PAST MEDICAL & SURGICAL HISTORY:  Hypothyroidism      Multiple myeloma      Asthma      No significant past surgical history          SOCIAL HISTORY: Substance Use (street drugs): ( x ) never used  (  ) other:    FAMILY HISTORY:  FHx: leukemia        REVIEW OF SYSTEMS:  CONSTITUTIONAL: No fever, weight loss, or fatigue  EYES: No eye pain, visual disturbances, or discharge  ENMT:  No difficulty hearing, tinnitus, vertigo; No sinus or throat pain  BREASTS: No pain, masses, or nipple discharge  GASTROINTESTINAL: No abdominal or epigastric pain. No nausea, vomiting, or hematemesis; No diarrhea or constipation. No melena or hematochezia.  GENITOURINARY: No dysuria, frequency, hematuria, or incontinence  NEUROLOGICAL: No headaches, memory loss, loss of strength, numbness, or tremors  ENDOCRINE: No heat or cold intolerance; No hair loss  MUSCULOSKELETAL: No joint pain or swelling; No muscle, back, or extremity pain  PSYCHIATRIC: No depression, anxiety, mood swings, or difficulty sleeping  HEME/LYMPH: No easy bruising, or bleeding gums  All others negative    MEDICATIONS:  enoxaparin Injectable 40 milliGRAM(s) SubCutaneous every 24 hours    piperacillin/tazobactam IVPB.- 3.375 Gram(s) IV Intermittent once    albuterol    90 MICROgram(s) HFA Inhaler 2 Puff(s) Inhalation every 6 hours  albuterol/ipratropium for Nebulization 3 milliLiter(s) Nebulizer every 6 hours  benzonatate 100 milliGRAM(s) Oral every 8 hours  guaiFENesin Oral Liquid (Sugar-Free) 200 milliGRAM(s) Oral every 6 hours PRN  loratadine 10 milliGRAM(s) Oral daily    acetaminophen     Tablet .. 650 milliGRAM(s) Oral every 6 hours PRN    famotidine    Tablet 40 milliGRAM(s) Oral daily  pantoprazole    Tablet 40 milliGRAM(s) Oral before breakfast  senna 2 Tablet(s) Oral at bedtime    dapagliflozin 10 milliGRAM(s) Oral every 24 hours  dextrose 50% Injectable 25 Gram(s) IV Push once  dextrose 50% Injectable 12.5 Gram(s) IV Push once  dextrose 50% Injectable 25 Gram(s) IV Push once  dextrose Oral Gel 15 Gram(s) Oral once PRN  glucagon  Injectable 1 milliGRAM(s) IntraMuscular once  insulin lispro (ADMELOG) corrective regimen sliding scale   SubCutaneous three times a day before meals  insulin lispro (ADMELOG) corrective regimen sliding scale   SubCutaneous at bedtime  levothyroxine 50 MICROGram(s) Oral daily  predniSONE   Tablet 40 milliGRAM(s) Oral daily    dextrose 5%. 1000 milliLiter(s) IV Continuous <Continuous>  dextrose 5%. 1000 milliLiter(s) IV Continuous <Continuous>  multivitamin 1 Tablet(s) Oral daily      FAMILY HISTORY:  FHx: leukemia          Allergies    sulfa drugs (Unknown)    Intolerances    	      PHYSICAL EXAM:  T(C): 36.6 (12-06-23 @ 13:03), Max: 36.9 (12-06-23 @ 04:46)  HR: 91 (12-06-23 @ 13:03) (91 - 97)  BP: 122/81 (12-06-23 @ 13:03) (105/71 - 123/82)  RR: 18 (12-06-23 @ 13:03) (18 - 19)  SpO2: 94% (12-06-23 @ 13:03) (94% - 97%)  Wt(kg): --  I&O's Summary    06 Dec 2023 07:01  -  06 Dec 2023 15:42  --------------------------------------------------------  IN: 0 mL / OUT: 1000 mL / NET: -1000 mL        GENERAL: NAD febrile   EYES: conjunctiva and sclera clear  ENMT: No tonsillar erythema, exudates, or enlargement   Cardiovascular: Normal S1 S2, No JVD, No murmurs  Respiratory: B/L basal ronchi   Gastrointestinal:  Soft, Non-tender, + BS	  Extremities: No edema      LABS:	 	    CARDIAC MARKERS:                                  9.7    6.57  )-----------( 103      ( 05 Dec 2023 07:00 )             31.6     12-05    137  |  102  |  17  ----------------------------<  199<H>  4.8   |  22  |  0.94    Ca    9.4      05 Dec 2023 07:00  Phos  3.0     12-05  Mg     2.4     12-05    TPro  6.9  /  Alb  3.4  /  TBili  0.3  /  DBili  x   /  AST  14  /  ALT  16  /  AlkPhos  70  12-05    proBNP:   Lipid Profile:   HgA1c:   TSH:     Consultant(s) Notes Reviewed:  [x ] YES  [ ] NO    Care Discussed with Consultants/Other Providers [ x] YES  [ ] NO    Imaging Personally Reviewed independently:  [x] YES  [ ] NO    All labs, radiologic studies, vitals, orders and medications list reviewed. Patient is seen and examined at bedside. Case discussed with medical team.    ASSESSMENT/PLAN:

## 2023-12-05 NOTE — CONSULT NOTE ADULT - ASSESSMENT
68 yo M with a h/o MM currently on daratumumab and waiting to receive pomalyst is admitted for SOB due to asthma exacerbation.    MM  - follows with Dr Block from [facility]  - previously on Dkd regimen c/b NICM/HFrEF   - On daratumumab now, waiting to start pomalyst  - no chemotherapy while inpatient    Asthma exacerbation  - management per primary team    Anemia  - Hb baseline around 10.5, now 9.7  - likely in the setting of MM and acute illness  - continue to monitor  - transfuse for Hb <7    Will follow          68 yo M with a h/o MM currently on daratumumab and waiting to receive pomalyst is admitted for SOB due to asthma exacerbation.    MM  - follows with Dr Block from [facility]  - previously on Dkd regimen c/b NICM/HFrEF   - On daratumumab now, waiting to start pomalyst  - no chemotherapy while inpatient    Asthma exacerbation  - management per primary team    Anemia  - Hb baseline around 10.5, now 9.7  - likely in the setting of MM and acute illness  - continue to monitor  - transfuse for Hb <7    Will follow

## 2023-12-05 NOTE — ED ADULT NURSE REASSESSMENT NOTE - NS ED NURSE REASSESS COMMENT FT1
Per daughter at bedside, pt is not diabetic and "should not receive insulin unless his sugar is very high." MD made aware

## 2023-12-05 NOTE — CONSULT NOTE ADULT - SUBJECTIVE AND OBJECTIVE BOX
PULMONARY CONSULT    HPI: 66 y/o Julisa-speaking M with PMH of MM (on weekly chemo/immunotherapy), chemo-induced NICM/HFrEF (EF 47% in10/2023), hypothyroidism. Presents with SOB/VELAZCO, productive cough, fevers, chills, and severe fatigue for the past ~1 wk. Reported Tmax of 102F at home yesterday. Was taking ibuprofen PRN at home, but fevers would quickly return. Also was taking azithromycin at home for the past 3 days without improvement and so was sent to the hospital. While in ED, pt noted to be diffusely wheezing and hypoxic to 80s on exertion. CXR grossly clear. Endorses white/clear sputum production. Denies CP, pleuritic CP.         PAST MEDICAL & SURGICAL HISTORY:  Hypothyroidism  Multiple myeloma  No significant past surgical history        Allergies  sulfa drugs (Unknown)        FAMILY HISTORY:  FHx: leukemia      Social history: never a smoker     Review of Systems:  CONSTITUTIONAL: Per above   EYES: No eye pain, visual disturbances, or discharge  ENMT:  No difficulty hearing, tinnitus, vertigo; No sinus or throat pain  NECK: No pain or stiffness  RESPIRATORY: Per above  CARDIOVASCULAR: No chest pain, palpitations, dizziness, or leg swelling  GASTROINTESTINAL: No abdominal or epigastric pain. No nausea, vomiting, or hematemesis; No diarrhea or constipation. No melena or hematochezia.  GENITOURINARY: No dysuria, frequency, hematuria, or incontinence  NEUROLOGICAL: No headaches, memory loss, loss of strength, numbness, or tremors  SKIN: No itching, burning, rashes, or lesions   MUSCULOSKELETAL: No joint pain or swelling; No muscle, back, or extremity pain  PSYCHIATRIC: No depression, anxiety, mood swings, or difficulty sleeping      Medications:  MEDICATIONS  (STANDING):  albuterol    90 MICROgram(s) HFA Inhaler 2 Puff(s) Inhalation every 6 hours  albuterol/ipratropium for Nebulization 3 milliLiter(s) Nebulizer every 6 hours  dapagliflozin 10 milliGRAM(s) Oral every 24 hours  dextrose 5%. 1000 milliLiter(s) (50 mL/Hr) IV Continuous <Continuous>  dextrose 5%. 1000 milliLiter(s) (100 mL/Hr) IV Continuous <Continuous>  dextrose 50% Injectable 25 Gram(s) IV Push once  dextrose 50% Injectable 25 Gram(s) IV Push once  dextrose 50% Injectable 12.5 Gram(s) IV Push once  enoxaparin Injectable 40 milliGRAM(s) SubCutaneous every 24 hours  famotidine    Tablet 40 milliGRAM(s) Oral daily  glucagon  Injectable 1 milliGRAM(s) IntraMuscular once  insulin lispro (ADMELOG) corrective regimen sliding scale   SubCutaneous three times a day before meals  insulin lispro (ADMELOG) corrective regimen sliding scale   SubCutaneous at bedtime  levothyroxine 50 MICROGram(s) Oral daily  loratadine 10 milliGRAM(s) Oral daily  predniSONE   Tablet 40 milliGRAM(s) Oral daily  senna 2 Tablet(s) Oral at bedtime    MEDICATIONS  (PRN):  acetaminophen     Tablet .. 650 milliGRAM(s) Oral every 6 hours PRN Temp greater or equal to 38C (100.4F), Mild Pain (1 - 3)  dextrose Oral Gel 15 Gram(s) Oral once PRN Blood Glucose LESS THAN 70 milliGRAM(s)/deciliter  guaiFENesin Oral Liquid (Sugar-Free) 200 milliGRAM(s) Oral every 6 hours PRN Cough            Vital Signs Last 24 Hrs  T(C): 36.9 (05 Dec 2023 09:03), Max: 37.7 (04 Dec 2023 17:15)  T(F): 98.4 (05 Dec 2023 09:03), Max: 99.8 (04 Dec 2023 17:15)  HR: 72 (05 Dec 2023 09:03) (72 - 103)  BP: 108/72 (05 Dec 2023 09:03) (108/72 - 133/72)  BP(mean): --  RR: 28 (05 Dec 2023 09:03) (18 - 28)  SpO2: 98% (05 Dec 2023 09:03) (95% - 100%)    Parameters below as of 05 Dec 2023 09:03  Patient On (Oxygen Delivery Method): nasal cannula  O2 Flow (L/min): 2      ABG - ( 05 Dec 2023 06:15 )  pH, Arterial: 7.38  pH, Blood: x     /  pCO2: 39    /  pO2: 135   / HCO3: 23    / Base Excess: -1.8  /  SaO2: 99.4              VBG pH 7.30 12-04 @ 15:55  VBG pCO2 51 12-04 @ 15:55  VBG O2 sat 23.8 12-04 @ 15:55  VBG lactate 2.0 12-04 @ 15:55            LABS:                        9.7    6.57  )-----------( 103      ( 05 Dec 2023 07:00 )             31.6     12-05    137  |  102  |  17  ----------------------------<  199<H>  4.8   |  22  |  0.94    Ca    9.4      05 Dec 2023 07:00  Phos  3.0     12-05  Mg     2.4     12-05    TPro  6.9  /  Alb  3.4  /  TBili  0.3  /  DBili  x   /  AST  14  /  ALT  16  /  AlkPhos  70  12-05          CAPILLARY BLOOD GLUCOSE      POCT Blood Glucose.: 128 mg/dL (05 Dec 2023 12:06)    PT/INR - ( 04 Dec 2023 16:21 )   PT: 11.4 sec;   INR: 1.09 ratio         PTT - ( 04 Dec 2023 16:21 )  PTT:28.4 sec  Urinalysis Basic - ( 05 Dec 2023 07:00 )    Color: x / Appearance: x / SG: x / pH: x  Gluc: 199 mg/dL / Ketone: x  / Bili: x / Urobili: x   Blood: x / Protein: x / Nitrite: x   Leuk Esterase: x / RBC: x / WBC x   Sq Epi: x / Non Sq Epi: x / Bacteria: x            Physical Examination:    General: No acute distress.      HEENT: Pupils equal, reactive to light.  Symmetric.    PULM: few scattered rhonchi, no wheezing     CVS: S1, S2    ABD: Soft, nondistended, nontender, normoactive bowel sounds, no masses    EXT: No edema, nontender    SKIN: Warm and well perfused, no rashes noted.    NEURO: Alert, oriented, interactive, nonfocal    RADIOLOGY REVIEWED  CXR: grossly clear

## 2023-12-05 NOTE — CONSULT NOTE ADULT - ASSESSMENT
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 Presents with SOB/VELAZCO, productive cough, fevers, chills, and severe fatigue for the past ~1 wk. RVP +RSV. While in ED, pt noted to be diffusely wheezing and hypoxic to 80s on exertion.  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 Presents with SOB/VELAZCO, productive cough, fevers, chills, and severe fatigue for the past ~1 wk. RVP +RSV. While in ED, pt noted to be diffusely wheezing and hypoxic to 80s on exertion.

## 2023-12-05 NOTE — PATIENT PROFILE ADULT - FALL HARM RISK - HARM RISK INTERVENTIONS
Communicate Risk of Fall with Harm to all staff/Monitor for mental status changes/Monitor gait and stability/Reinforce activity limits and safety measures with patient and family/Reorient to person, place and time as needed/Review medications for side effects contributing to fall risk/Tailored Fall Risk Interventions/Use of alarms - bed, chair and/or voice tab/Visual Cue: Yellow wristband and red socks/Bed in lowest position, wheels locked, appropriate side rails in place/Call bell, personal items and telephone in reach/Instruct patient to call for assistance before getting out of bed or chair/Non-slip footwear when patient is out of bed/[place] to call system/Physically safe environment - no spills, clutter or unnecessary equipment/Purposeful Proactive Rounding/Room/bathroom lighting operational, light cord in reach Communicate Risk of Fall with Harm to all staff/Monitor for mental status changes/Monitor gait and stability/Reinforce activity limits and safety measures with patient and family/Reorient to person, place and time as needed/Review medications for side effects contributing to fall risk/Tailored Fall Risk Interventions/Use of alarms - bed, chair and/or voice tab/Visual Cue: Yellow wristband and red socks/Bed in lowest position, wheels locked, appropriate side rails in place/Call bell, personal items and telephone in reach/Instruct patient to call for assistance before getting out of bed or chair/Non-slip footwear when patient is out of bed/[place] to call system/Physically safe environment - no spills, clutter or unnecessary equipment/Purposeful Proactive Rounding/Room/bathroom lighting operational, light cord in reach

## 2023-12-05 NOTE — PROGRESS NOTE ADULT - PROBLEM SELECTOR PLAN 4
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 On admission, proBNP wnl and clinically euvolemic appearing.   - not in active CHF exacerbation  - was started on ASA/statin last admission for "non-obstructive CAD" but [facility] (10/15/23) reported no disease; pt stated not taking theses medication at home, will continue to hold for now  - at home, not on previously prescribed valsartan and metoprolol/carvedilol due to drops in BP per family  - resume antihypertensives for GDMT if able to tolerate  - c/w home Farxiga Hx of 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). On admission, proBNP wnl and clinically euvolemic appearing.   - not in active CHF exacerbation  - was started on ASA/statin last admission for "non-obstructive CAD" but [facility] (10/15/23) reported no disease; pt stated not taking theses medication at home, will continue to hold for now  - at home, not on previously prescribed valsartan and metoprolol/carvedilol due to drops in BP per family  - resume antihypertensives for GDMT if able to tolerate  - c/w home Farxiga

## 2023-12-05 NOTE — CONSULT NOTE ADULT - SUBJECTIVE AND OBJECTIVE BOX
Reason for consult:    HPI:  67M Julisa-speaking w/ hx of MM (on weekly chemo/immunotherapy), 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), hypothyroidism, asthma, presenting with SOB/VELAZCO, productive cough, fevers, chills, and severe fatigue for the past ~1 wk. Reported Tmax of 102F at home yesterday. Was taking ibuprofen PRN at home, but fevers would quickly return. Also was taking azithromycin at home for the past 3 days without improvement and so was sent to the hospital. Denied n/v, runny nose, CP, abdominal pain, dysuria, hematuria, hematochezia, melena, diarrhea, constipation, recent travel, sick contacts. Of note, pt was started on several medications as GDMT for his CHF last admission in 10/2023, but son-in-law noted that he was taken off the antihypertensives because his blood pressure would drop to 70s.    In ED: Tmax 99.8F oral, HR 100s, SBP 100s-130s, RR 18-20, sating 95-96% on RA. However, per ED documentation, ambulatory O2 sat dropped to 80s and associated with worsened wheeze, tachypnea, and tachycardia. RSV positive. CXR w/o focal consolidation. Received ofirmev 1g and solumedrol 125mg. Admitted to Medicine for further management. (04 Dec 2023 20:29)      PAST MEDICAL & SURGICAL HISTORY:  Hypothyroidism      Multiple myeloma      Asthma      No significant past surgical history          FAMILY HISTORY:  FHx: leukemia        Alochol: Denied  Smoking: Nonsmoker  Drug Use: Denied  Marital Status:         Allergies    sulfa drugs (Unknown)    Intolerances        MEDICATIONS  (STANDING):  albuterol    90 MICROgram(s) HFA Inhaler 2 Puff(s) Inhalation every 6 hours  albuterol/ipratropium for Nebulization 3 milliLiter(s) Nebulizer every 6 hours  dapagliflozin 10 milliGRAM(s) Oral every 24 hours  dextrose 5%. 1000 milliLiter(s) (50 mL/Hr) IV Continuous <Continuous>  dextrose 5%. 1000 milliLiter(s) (100 mL/Hr) IV Continuous <Continuous>  dextrose 50% Injectable 25 Gram(s) IV Push once  dextrose 50% Injectable 12.5 Gram(s) IV Push once  dextrose 50% Injectable 25 Gram(s) IV Push once  enoxaparin Injectable 40 milliGRAM(s) SubCutaneous every 24 hours  famotidine    Tablet 40 milliGRAM(s) Oral daily  glucagon  Injectable 1 milliGRAM(s) IntraMuscular once  insulin lispro (ADMELOG) corrective regimen sliding scale   SubCutaneous three times a day before meals  insulin lispro (ADMELOG) corrective regimen sliding scale   SubCutaneous at bedtime  levothyroxine 50 MICROGram(s) Oral daily  loratadine 10 milliGRAM(s) Oral daily  predniSONE   Tablet 40 milliGRAM(s) Oral daily  senna 2 Tablet(s) Oral at bedtime    MEDICATIONS  (PRN):  acetaminophen     Tablet .. 650 milliGRAM(s) Oral every 6 hours PRN Temp greater or equal to 38C (100.4F), Mild Pain (1 - 3)  dextrose Oral Gel 15 Gram(s) Oral once PRN Blood Glucose LESS THAN 70 milliGRAM(s)/deciliter  guaiFENesin Oral Liquid (Sugar-Free) 200 milliGRAM(s) Oral every 6 hours PRN Cough      ROS  No fever, sweats, chills  No epistaxis, HA, sore throat  No CP, SOB, cough, sputum  No n/v/d, abd pain, melena, hematochezia  No edema  No rash  No anxiety  No back pain, joint pain  No bleeding, bruising  No dysuria, hematuria    T(C): 36.9 (12-05-23 @ 09:03), Max: 37.7 (12-04-23 @ 17:15)  HR: 72 (12-05-23 @ 09:03) (72 - 103)  BP: 108/72 (12-05-23 @ 09:03) (108/72 - 133/72)  RR: 28 (12-05-23 @ 09:03) (18 - 28)  SpO2: 98% (12-05-23 @ 09:03) (95% - 100%)  Wt(kg): --    PE  NAD  Awake, alert  Anicteric, MMM  RRR  CTAB  Abd soft, NT, ND  No c/c/e  No rash grossly  FROM                          9.7    6.57  )-----------( 103      ( 05 Dec 2023 07:00 )             31.6       12-05    137  |  102  |  17  ----------------------------<  199<H>  4.8   |  22  |  0.94    Ca    9.4      05 Dec 2023 07:00  Phos  3.0     12-05  Mg     2.4     12-05    TPro  6.9  /  Alb  3.4  /  TBili  0.3  /  DBili  x   /  AST  14  /  ALT  16  /  AlkPhos  70  12-05   Reason for consult: MM    HPI:    66 yo M with a h/o MM currently on daratumumab and waiting to receive pomalyst is admitted for SOB.  Patient follows with Dr Block from [facility], previously on Dkd regimen c/b NICM/HFrEF (EF 47% in 10/2023)  Now with productive cough, fevers, chills, and severe fatigue for the past one week.  Not improving following azithromycin  Workup with RSV positive , and focal consolidation on CXR. Started on steroids and antivirals.      PAST MEDICAL & SURGICAL HISTORY:  Hypothyroidism      Multiple myeloma      Asthma      No significant past surgical history          FAMILY HISTORY:  FHx: leukemia        Alochol: Denied  Smoking: Nonsmoker  Drug Use: Denied  Marital Status:         Allergies    sulfa drugs (Unknown)    Intolerances        MEDICATIONS  (STANDING):  albuterol    90 MICROgram(s) HFA Inhaler 2 Puff(s) Inhalation every 6 hours  albuterol/ipratropium for Nebulization 3 milliLiter(s) Nebulizer every 6 hours  dapagliflozin 10 milliGRAM(s) Oral every 24 hours  dextrose 5%. 1000 milliLiter(s) (50 mL/Hr) IV Continuous <Continuous>  dextrose 5%. 1000 milliLiter(s) (100 mL/Hr) IV Continuous <Continuous>  dextrose 50% Injectable 25 Gram(s) IV Push once  dextrose 50% Injectable 12.5 Gram(s) IV Push once  dextrose 50% Injectable 25 Gram(s) IV Push once  enoxaparin Injectable 40 milliGRAM(s) SubCutaneous every 24 hours  famotidine    Tablet 40 milliGRAM(s) Oral daily  glucagon  Injectable 1 milliGRAM(s) IntraMuscular once  insulin lispro (ADMELOG) corrective regimen sliding scale   SubCutaneous three times a day before meals  insulin lispro (ADMELOG) corrective regimen sliding scale   SubCutaneous at bedtime  levothyroxine 50 MICROGram(s) Oral daily  loratadine 10 milliGRAM(s) Oral daily  predniSONE   Tablet 40 milliGRAM(s) Oral daily  senna 2 Tablet(s) Oral at bedtime    MEDICATIONS  (PRN):  acetaminophen     Tablet .. 650 milliGRAM(s) Oral every 6 hours PRN Temp greater or equal to 38C (100.4F), Mild Pain (1 - 3)  dextrose Oral Gel 15 Gram(s) Oral once PRN Blood Glucose LESS THAN 70 milliGRAM(s)/deciliter  guaiFENesin Oral Liquid (Sugar-Free) 200 milliGRAM(s) Oral every 6 hours PRN Cough      ROS  PER HPI, Otherwise negative    T(C): 36.9 (12-05-23 @ 09:03), Max: 37.7 (12-04-23 @ 17:15)  HR: 72 (12-05-23 @ 09:03) (72 - 103)  BP: 108/72 (12-05-23 @ 09:03) (108/72 - 133/72)  RR: 28 (12-05-23 @ 09:03) (18 - 28)  SpO2: 98% (12-05-23 @ 09:03) (95% - 100%)  Wt(kg): --    PE  NAD, on oxygen NC  Awake, alert  Anicteric, MMM  RRR  CTAB  Abd soft, NT, ND  No c/c/e  No rash grossly  FROM                          9.7    6.57  )-----------( 103      ( 05 Dec 2023 07:00 )             31.6       12-05    137  |  102  |  17  ----------------------------<  199<H>  4.8   |  22  |  0.94    Ca    9.4      05 Dec 2023 07:00  Phos  3.0     12-05  Mg     2.4     12-05    TPro  6.9  /  Alb  3.4  /  TBili  0.3  /  DBili  x   /  AST  14  /  ALT  16  /  AlkPhos  70  12-05   Reason for consult: MM    HPI:    66 yo M with a h/o MM currently on daratumumab and waiting to receive pomalyst is admitted for SOB.  Patient follows with Dr Block from [facility], previously on Dkd regimen c/b NICM/HFrEF (EF 47% in 10/2023)  Now with productive cough, fevers, chills, and severe fatigue for the past one week.  Not improving following azithromycin  Workup with RSV positive , and focal consolidation on CXR. Started on steroids and antivirals.      PAST MEDICAL & SURGICAL HISTORY:  Hypothyroidism      Multiple myeloma      Asthma      No significant past surgical history          FAMILY HISTORY:  FHx: leukemia        Alochol: Denied  Smoking: Nonsmoker  Drug Use: Denied  Marital Status:         Allergies    sulfa drugs (Unknown)    Intolerances        MEDICATIONS  (STANDING):  albuterol    90 MICROgram(s) HFA Inhaler 2 Puff(s) Inhalation every 6 hours  albuterol/ipratropium for Nebulization 3 milliLiter(s) Nebulizer every 6 hours  dapagliflozin 10 milliGRAM(s) Oral every 24 hours  dextrose 5%. 1000 milliLiter(s) (50 mL/Hr) IV Continuous <Continuous>  dextrose 5%. 1000 milliLiter(s) (100 mL/Hr) IV Continuous <Continuous>  dextrose 50% Injectable 25 Gram(s) IV Push once  dextrose 50% Injectable 12.5 Gram(s) IV Push once  dextrose 50% Injectable 25 Gram(s) IV Push once  enoxaparin Injectable 40 milliGRAM(s) SubCutaneous every 24 hours  famotidine    Tablet 40 milliGRAM(s) Oral daily  glucagon  Injectable 1 milliGRAM(s) IntraMuscular once  insulin lispro (ADMELOG) corrective regimen sliding scale   SubCutaneous three times a day before meals  insulin lispro (ADMELOG) corrective regimen sliding scale   SubCutaneous at bedtime  levothyroxine 50 MICROGram(s) Oral daily  loratadine 10 milliGRAM(s) Oral daily  predniSONE   Tablet 40 milliGRAM(s) Oral daily  senna 2 Tablet(s) Oral at bedtime    MEDICATIONS  (PRN):  acetaminophen     Tablet .. 650 milliGRAM(s) Oral every 6 hours PRN Temp greater or equal to 38C (100.4F), Mild Pain (1 - 3)  dextrose Oral Gel 15 Gram(s) Oral once PRN Blood Glucose LESS THAN 70 milliGRAM(s)/deciliter  guaiFENesin Oral Liquid (Sugar-Free) 200 milliGRAM(s) Oral every 6 hours PRN Cough      ROS  PER HPI, Otherwise negative    T(C): 36.9 (12-05-23 @ 09:03), Max: 37.7 (12-04-23 @ 17:15)  HR: 72 (12-05-23 @ 09:03) (72 - 103)  BP: 108/72 (12-05-23 @ 09:03) (108/72 - 133/72)  RR: 28 (12-05-23 @ 09:03) (18 - 28)  SpO2: 98% (12-05-23 @ 09:03) (95% - 100%)  Wt(kg): --    PE  NAD, on oxygen NC  Awake, alert  Anicteric, MMM  RRR  CTAB  Abd soft, NT, ND  No c/c/e  No rash grossly  FROM                          9.7    6.57  )-----------( 103      ( 05 Dec 2023 07:00 )             31.6       12-05    137  |  102  |  17  ----------------------------<  199<H>  4.8   |  22  |  0.94    Ca    9.4      05 Dec 2023 07:00  Phos  3.0     12-05  Mg     2.4     12-05    TPro  6.9  /  Alb  3.4  /  TBili  0.3  /  DBili  x   /  AST  14  /  ALT  16  /  AlkPhos  70  12-05

## 2023-12-05 NOTE — CONSULT NOTE ADULT - PROBLEM SELECTOR RECOMMENDATION 9
RVP +RSV  -Per ED note, pt diffusely wheezing on admission. S/p solumedrol 125mg IVP x1.   -No wheezing at this time  -Continue prednisone 40mg PO qd x 5 days  -Duoneb q6h started  -Keep sats >90% with o2 PRN (currently RA-2LNC)  -CXR grossly clear. CT chest ordered to r/o PNA.

## 2023-12-06 DIAGNOSIS — J18.9 PNEUMONIA, UNSPECIFIED ORGANISM: ICD-10-CM

## 2023-12-06 DIAGNOSIS — R91.1 SOLITARY PULMONARY NODULE: ICD-10-CM

## 2023-12-06 LAB
CULTURE RESULTS: SIGNIFICANT CHANGE UP
CULTURE RESULTS: SIGNIFICANT CHANGE UP
GLUCOSE BLDC GLUCOMTR-MCNC: 114 MG/DL — HIGH (ref 70–99)
GLUCOSE BLDC GLUCOMTR-MCNC: 114 MG/DL — HIGH (ref 70–99)
GLUCOSE BLDC GLUCOMTR-MCNC: 125 MG/DL — HIGH (ref 70–99)
GLUCOSE BLDC GLUCOMTR-MCNC: 125 MG/DL — HIGH (ref 70–99)
GLUCOSE BLDC GLUCOMTR-MCNC: 153 MG/DL — HIGH (ref 70–99)
GLUCOSE BLDC GLUCOMTR-MCNC: 153 MG/DL — HIGH (ref 70–99)
GLUCOSE BLDC GLUCOMTR-MCNC: 170 MG/DL — HIGH (ref 70–99)
GLUCOSE BLDC GLUCOMTR-MCNC: 170 MG/DL — HIGH (ref 70–99)
SPECIMEN SOURCE: SIGNIFICANT CHANGE UP
SPECIMEN SOURCE: SIGNIFICANT CHANGE UP

## 2023-12-06 PROCEDURE — 99223 1ST HOSP IP/OBS HIGH 75: CPT

## 2023-12-06 PROCEDURE — 74177 CT ABD & PELVIS W/CONTRAST: CPT | Mod: 26

## 2023-12-06 RX ORDER — PIPERACILLIN AND TAZOBACTAM 4; .5 G/20ML; G/20ML
3.38 INJECTION, POWDER, LYOPHILIZED, FOR SOLUTION INTRAVENOUS EVERY 8 HOURS
Refills: 0 | Status: DISCONTINUED | OUTPATIENT
Start: 2023-12-07 | End: 2023-12-08

## 2023-12-06 RX ORDER — PIPERACILLIN AND TAZOBACTAM 4; .5 G/20ML; G/20ML
3.38 INJECTION, POWDER, LYOPHILIZED, FOR SOLUTION INTRAVENOUS ONCE
Refills: 0 | Status: COMPLETED | OUTPATIENT
Start: 2023-12-06 | End: 2023-12-06

## 2023-12-06 RX ORDER — PANTOPRAZOLE SODIUM 20 MG/1
40 TABLET, DELAYED RELEASE ORAL
Refills: 0 | Status: DISCONTINUED | OUTPATIENT
Start: 2023-12-06 | End: 2023-12-08

## 2023-12-06 RX ORDER — PIPERACILLIN AND TAZOBACTAM 4; .5 G/20ML; G/20ML
3.38 INJECTION, POWDER, LYOPHILIZED, FOR SOLUTION INTRAVENOUS ONCE
Refills: 0 | Status: COMPLETED | OUTPATIENT
Start: 2023-12-07 | End: 2023-12-07

## 2023-12-06 RX ADMIN — ENOXAPARIN SODIUM 40 MILLIGRAM(S): 100 INJECTION SUBCUTANEOUS at 06:26

## 2023-12-06 RX ADMIN — Medication 100 MILLIGRAM(S): at 12:42

## 2023-12-06 RX ADMIN — Medication 3 MILLILITER(S): at 17:24

## 2023-12-06 RX ADMIN — DAPAGLIFLOZIN 10 MILLIGRAM(S): 10 TABLET, FILM COATED ORAL at 06:35

## 2023-12-06 RX ADMIN — Medication 50 MICROGRAM(S): at 05:10

## 2023-12-06 RX ADMIN — Medication 3 MILLILITER(S): at 12:41

## 2023-12-06 RX ADMIN — FAMOTIDINE 40 MILLIGRAM(S): 10 INJECTION INTRAVENOUS at 12:41

## 2023-12-06 RX ADMIN — PIPERACILLIN AND TAZOBACTAM 200 GRAM(S): 4; .5 INJECTION, POWDER, LYOPHILIZED, FOR SOLUTION INTRAVENOUS at 14:42

## 2023-12-06 RX ADMIN — Medication 100 MILLIGRAM(S): at 22:31

## 2023-12-06 RX ADMIN — PIPERACILLIN AND TAZOBACTAM 25 GRAM(S): 4; .5 INJECTION, POWDER, LYOPHILIZED, FOR SOLUTION INTRAVENOUS at 17:24

## 2023-12-06 RX ADMIN — Medication 3 MILLILITER(S): at 06:13

## 2023-12-06 RX ADMIN — Medication 40 MILLIGRAM(S): at 05:10

## 2023-12-06 RX ADMIN — LORATADINE 10 MILLIGRAM(S): 10 TABLET ORAL at 12:42

## 2023-12-06 NOTE — CONSULT NOTE ADULT - SUBJECTIVE AND OBJECTIVE BOX
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), developed cough about a week ago, was given azithro which he took for 3 days with no improvement and became febrile so came to the hospital, he has had VELAZCO for 1-2 months now worse, cough is also productive   here pt afebrile, tachypneic requiring O2, no WBC  RSV positive  chest CT: multifocal pneumonia, also new pneumobilia and gas within gall bladder  blood and urine cx negative        PAST MEDICAL & SURGICAL HISTORY:  Hypothyroidism      Multiple myeloma      Asthma      No significant past surgical history          Allergies    sulfa drugs (Unknown)    Intolerances        ANTIMICROBIALS:      OTHER MEDS:  acetaminophen     Tablet .. 650 milliGRAM(s) Oral every 6 hours PRN  albuterol    90 MICROgram(s) HFA Inhaler 2 Puff(s) Inhalation every 6 hours  albuterol/ipratropium for Nebulization 3 milliLiter(s) Nebulizer every 6 hours  benzonatate 100 milliGRAM(s) Oral every 8 hours  dapagliflozin 10 milliGRAM(s) Oral every 24 hours  dextrose 5%. 1000 milliLiter(s) IV Continuous <Continuous>  dextrose 5%. 1000 milliLiter(s) IV Continuous <Continuous>  dextrose 50% Injectable 25 Gram(s) IV Push once  dextrose 50% Injectable 12.5 Gram(s) IV Push once  dextrose 50% Injectable 25 Gram(s) IV Push once  dextrose Oral Gel 15 Gram(s) Oral once PRN  enoxaparin Injectable 40 milliGRAM(s) SubCutaneous every 24 hours  famotidine    Tablet 40 milliGRAM(s) Oral daily  glucagon  Injectable 1 milliGRAM(s) IntraMuscular once  guaiFENesin Oral Liquid (Sugar-Free) 200 milliGRAM(s) Oral every 6 hours PRN  insulin lispro (ADMELOG) corrective regimen sliding scale   SubCutaneous at bedtime  insulin lispro (ADMELOG) corrective regimen sliding scale   SubCutaneous three times a day before meals  levothyroxine 50 MICROGram(s) Oral daily  loratadine 10 milliGRAM(s) Oral daily  multivitamin 1 Tablet(s) Oral daily  pantoprazole    Tablet 40 milliGRAM(s) Oral before breakfast  predniSONE   Tablet 40 milliGRAM(s) Oral daily  senna 2 Tablet(s) Oral at bedtime      SOCIAL HISTORY:  from [facility], lives with family  no smoking, alcohol or drug abuse  no recent travel    FAMILY HISTORY:  FHx: leukemia        ROS:    All other systems negative     Constitutional: + fever  Eye: no eye pain, no redness, no vision changes  ENT:  no sore throat, no rhinorrhea  Cardiovascular:  no chest pain, no palpitation  Respiratory:  +VELAZCO + cough  GI:  no abd pain, no vomiting, no diarrhea  urinary: no dysuria, no hematuria, no flank pain  : no penile discharge or bleeding  musculoskeletal:  no joint pain, no joint swelling  skin:  no rash  neurology:  no headache, no seizure, no change in mental status  psych: no anxiety, no depression     Physical Exam:    General:    NAD, non toxic  Head: atraumatic, normocephalic  Eyes: normal sclera and conjunctiva  ENT:   no oropharyngeal lesions, no LAD, neck supple  Cardio:    regular S1,S2  Respiratory:  diffuse wheezing, comfortable on NC  abd:   soft,  not tender  :     no CVAT, no suprapubic tenderness, no valdovinos  Musculoskeletal : no joint swelling, no edema  Skin:    no rash  vascular: no phlebitis  Neurologic:     no focal deficits  psych: normal affect      Drug Dosing Weight  Height (cm): 162.6 (04 Dec 2023 12:17)  Weight (kg): 85.3 (12 Oct 2023 13:17)  BMI (kg/m2): 32.3 (04 Dec 2023 12:17)  BSA (m2): 1.91 (04 Dec 2023 12:17)    Vital Signs Last 24 Hrs  T(F): 97.8 (12-06-23 @ 13:03), Max: 99.8 (12-04-23 @ 17:15)    Vital Signs Last 24 Hrs  HR: 91 (12-06-23 @ 13:03) (91 - 97)  BP: 122/81 (12-06-23 @ 13:03) (105/71 - 123/82)  RR: 18 (12-06-23 @ 13:03)  SpO2: 94% (12-06-23 @ 13:03) (94% - 97%)  Wt(kg): --                          9.7    6.57  )-----------( 103      ( 05 Dec 2023 07:00 )             31.6       12-05    137  |  102  |  17  ----------------------------<  199<H>  4.8   |  22  |  0.94    Ca    9.4      05 Dec 2023 07:00  Phos  3.0     12-05  Mg     2.4     12-05    TPro  6.9  /  Alb  3.4  /  TBili  0.3  /  DBili  x   /  AST  14  /  ALT  16  /  AlkPhos  70  12-05      Urinalysis Basic - ( 05 Dec 2023 07:00 )    Color: x / Appearance: x / SG: x / pH: x  Gluc: 199 mg/dL / Ketone: x  / Bili: x / Urobili: x   Blood: x / Protein: x / Nitrite: x   Leuk Esterase: x / RBC: x / WBC x   Sq Epi: x / Non Sq Epi: x / Bacteria: x        MICROBIOLOGY:  v  Clean Catch Clean Catch (Midstream)  12-04-23   <10,000 CFU/mL Normal Urogenital Genet  --  --      .Blood Blood-Peripheral  12-04-23   No growth at 24 hours  --  --      .Blood Blood-Peripheral  12-04-23   No growth at 24 hours  --  --                  RADIOLOGY:    Images independently visualized and reviewed personally,  findings as below    < from: CT Chest No Cont (12.05.23 @ 20:02) >    Findings compatible with multifocal pneumonia, new compared to   10/12/2023. Recommend CT chest follow-up in 3 months to ensure clearing.    Indeterminate pulmonary nodules measuring up to 0.7 cm; recommend   attention on follow-up examination.    Pneumobilia and gas within the gallbladder, new compared to 10/12/2023;   infection should be considered as an etiology if there has been no   procedure performed (such as ERCP).    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), developed cough about a week ago, was given azithro which he took for 3 days with no improvement and became febrile so came to the hospital, he has had VELAZCO for 1-2 months now worse, cough is also productive   here pt afebrile, tachypneic requiring O2, no WBC  RSV positive  chest CT: multifocal pneumonia, also new pneumobilia and gas within gall bladder  blood and urine cx negative        PAST MEDICAL & SURGICAL HISTORY:  Hypothyroidism      Multiple myeloma      Asthma      No significant past surgical history          Allergies    sulfa drugs (Unknown)    Intolerances        ANTIMICROBIALS:      OTHER MEDS:  acetaminophen     Tablet .. 650 milliGRAM(s) Oral every 6 hours PRN  albuterol    90 MICROgram(s) HFA Inhaler 2 Puff(s) Inhalation every 6 hours  albuterol/ipratropium for Nebulization 3 milliLiter(s) Nebulizer every 6 hours  benzonatate 100 milliGRAM(s) Oral every 8 hours  dapagliflozin 10 milliGRAM(s) Oral every 24 hours  dextrose 5%. 1000 milliLiter(s) IV Continuous <Continuous>  dextrose 5%. 1000 milliLiter(s) IV Continuous <Continuous>  dextrose 50% Injectable 25 Gram(s) IV Push once  dextrose 50% Injectable 12.5 Gram(s) IV Push once  dextrose 50% Injectable 25 Gram(s) IV Push once  dextrose Oral Gel 15 Gram(s) Oral once PRN  enoxaparin Injectable 40 milliGRAM(s) SubCutaneous every 24 hours  famotidine    Tablet 40 milliGRAM(s) Oral daily  glucagon  Injectable 1 milliGRAM(s) IntraMuscular once  guaiFENesin Oral Liquid (Sugar-Free) 200 milliGRAM(s) Oral every 6 hours PRN  insulin lispro (ADMELOG) corrective regimen sliding scale   SubCutaneous at bedtime  insulin lispro (ADMELOG) corrective regimen sliding scale   SubCutaneous three times a day before meals  levothyroxine 50 MICROGram(s) Oral daily  loratadine 10 milliGRAM(s) Oral daily  multivitamin 1 Tablet(s) Oral daily  pantoprazole    Tablet 40 milliGRAM(s) Oral before breakfast  predniSONE   Tablet 40 milliGRAM(s) Oral daily  senna 2 Tablet(s) Oral at bedtime      SOCIAL HISTORY:  from [facility], lives with family  no smoking, alcohol or drug abuse  no recent travel    FAMILY HISTORY:  FHx: leukemia        ROS:    All other systems negative     Constitutional: + fever  Eye: no eye pain, no redness, no vision changes  ENT:  no sore throat, no rhinorrhea  Cardiovascular:  no chest pain, no palpitation  Respiratory:  +VELAZCO + cough  GI:  no abd pain, no vomiting, no diarrhea  urinary: no dysuria, no hematuria, no flank pain  : no penile discharge or bleeding  musculoskeletal:  no joint pain, no joint swelling  skin:  no rash  neurology:  no headache, no seizure, no change in mental status  psych: no anxiety, no depression     Physical Exam:    General:    NAD, non toxic  Head: atraumatic, normocephalic  Eyes: normal sclera and conjunctiva  ENT:   no oropharyngeal lesions, no LAD, neck supple  Cardio:    regular S1,S2  Respiratory:  diffuse wheezing, comfortable on NC  abd:   soft,  not tender  :     no CVAT, no suprapubic tenderness, no valdovinos  Musculoskeletal : no joint swelling, no edema  Skin:    no rash  vascular: no phlebitis  Neurologic:     no focal deficits  psych: normal affect      Drug Dosing Weight  Height (cm): 162.6 (04 Dec 2023 12:17)  Weight (kg): 85.3 (12 Oct 2023 13:17)  BMI (kg/m2): 32.3 (04 Dec 2023 12:17)  BSA (m2): 1.91 (04 Dec 2023 12:17)    Vital Signs Last 24 Hrs  T(F): 97.8 (12-06-23 @ 13:03), Max: 99.8 (12-04-23 @ 17:15)    Vital Signs Last 24 Hrs  HR: 91 (12-06-23 @ 13:03) (91 - 97)  BP: 122/81 (12-06-23 @ 13:03) (105/71 - 123/82)  RR: 18 (12-06-23 @ 13:03)  SpO2: 94% (12-06-23 @ 13:03) (94% - 97%)  Wt(kg): --                          9.7    6.57  )-----------( 103      ( 05 Dec 2023 07:00 )             31.6       12-05    137  |  102  |  17  ----------------------------<  199<H>  4.8   |  22  |  0.94    Ca    9.4      05 Dec 2023 07:00  Phos  3.0     12-05  Mg     2.4     12-05    TPro  6.9  /  Alb  3.4  /  TBili  0.3  /  DBili  x   /  AST  14  /  ALT  16  /  AlkPhos  70  12-05      Urinalysis Basic - ( 05 Dec 2023 07:00 )    Color: x / Appearance: x / SG: x / pH: x  Gluc: 199 mg/dL / Ketone: x  / Bili: x / Urobili: x   Blood: x / Protein: x / Nitrite: x   Leuk Esterase: x / RBC: x / WBC x   Sq Epi: x / Non Sq Epi: x / Bacteria: x        MICROBIOLOGY:  v  Clean Catch Clean Catch (Midstream)  12-04-23   <10,000 CFU/mL Normal Urogenital Genet  --  --      .Blood Blood-Peripheral  12-04-23   No growth at 24 hours  --  --      .Blood Blood-Peripheral  12-04-23   No growth at 24 hours  --  --                  RADIOLOGY:    Images independently visualized and reviewed personally,  findings as below    < from: CT Chest No Cont (12.05.23 @ 20:02) >    Findings compatible with multifocal pneumonia, new compared to   10/12/2023. Recommend CT chest follow-up in 3 months to ensure clearing.    Indeterminate pulmonary nodules measuring up to 0.7 cm; recommend   attention on follow-up examination.    Pneumobilia and gas within the gallbladder, new compared to 10/12/2023;   infection should be considered as an etiology if there has been no   procedure performed (such as ERCP).    < end of copied text >

## 2023-12-06 NOTE — CONSULT NOTE ADULT - ASSESSMENT
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), developed cough about a week ago, was given azithro which he took for 3 days with no improvement and became febrile so came to the hospital, he has had VELAZCO for 1-2 months now worse, cough is also productive   here pt afebrile, tachypneic requiring O2, no WBC  RSV positive  chest CT: multifocal pneumonia, also new pneumobilia and gas within gall bladder  blood and urine cx negative    immunocompromised pt with MM on chemo with RSV pneumonia and asthma exacerbation, no WBC and the opacities are more patchy s/o viral pneumonia but pt has productive cough and immunocompromised  new pneumobilia and gas in gallbladder is concerning (s/p methylprednisolone 125 and now on prednisone 40 so could be asymptomatic)    * check sputum cx and procal  * would start zosyn in view of gas in the gallbladder and get abd/pelvis CT with contrast  * monitor CBC/diff and CMP    The above assessment and plan was discussed with the primary team    Santa Candelario MD  contact on teams  After 5pm and on weekends call [phone]   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), developed cough about a week ago, was given azithro which he took for 3 days with no improvement and became febrile so came to the hospital, he has had VELAZCO for 1-2 months now worse, cough is also productive   here pt afebrile, tachypneic requiring O2, no WBC  RSV positive  chest CT: multifocal pneumonia, also new pneumobilia and gas within gall bladder  blood and urine cx negative    immunocompromised pt with MM on chemo with RSV pneumonia and asthma exacerbation, no WBC and the opacities are more patchy s/o viral pneumonia but pt has productive cough and immunocompromised  new pneumobilia and gas in gallbladder is concerning (s/p methylprednisolone 125 and now on prednisone 40 so could be asymptomatic)    * check sputum cx and procal  * would start zosyn in view of gas in the gallbladder and get abd/pelvis CT with contrast  * monitor CBC/diff and CMP    The above assessment and plan was discussed with the primary team    Santa Candelario MD  contact on teams  After 5pm and on weekends call [phone]

## 2023-12-06 NOTE — PROGRESS NOTE ADULT - SUBJECTIVE AND OBJECTIVE BOX
Follow-up Pulm Progress Note    +Cough  Denies SOB at rest. VELAZCO unchanged  O2 sats 99% on 3LNC, lowered to 2LNC     Medications:  MEDICATIONS  (STANDING):  albuterol    90 MICROgram(s) HFA Inhaler 2 Puff(s) Inhalation every 6 hours  albuterol/ipratropium for Nebulization 3 milliLiter(s) Nebulizer every 6 hours  benzonatate 100 milliGRAM(s) Oral every 8 hours  dapagliflozin 10 milliGRAM(s) Oral every 24 hours  dextrose 5%. 1000 milliLiter(s) (50 mL/Hr) IV Continuous <Continuous>  dextrose 5%. 1000 milliLiter(s) (100 mL/Hr) IV Continuous <Continuous>  dextrose 50% Injectable 25 Gram(s) IV Push once  dextrose 50% Injectable 12.5 Gram(s) IV Push once  dextrose 50% Injectable 25 Gram(s) IV Push once  enoxaparin Injectable 40 milliGRAM(s) SubCutaneous every 24 hours  famotidine    Tablet 40 milliGRAM(s) Oral daily  glucagon  Injectable 1 milliGRAM(s) IntraMuscular once  insulin lispro (ADMELOG) corrective regimen sliding scale   SubCutaneous at bedtime  insulin lispro (ADMELOG) corrective regimen sliding scale   SubCutaneous three times a day before meals  levothyroxine 50 MICROGram(s) Oral daily  loratadine 10 milliGRAM(s) Oral daily  predniSONE   Tablet 40 milliGRAM(s) Oral daily  senna 2 Tablet(s) Oral at bedtime    MEDICATIONS  (PRN):  acetaminophen     Tablet .. 650 milliGRAM(s) Oral every 6 hours PRN Temp greater or equal to 38C (100.4F), Mild Pain (1 - 3)  dextrose Oral Gel 15 Gram(s) Oral once PRN Blood Glucose LESS THAN 70 milliGRAM(s)/deciliter  guaiFENesin Oral Liquid (Sugar-Free) 200 milliGRAM(s) Oral every 6 hours PRN Cough          Vital Signs Last 24 Hrs  T(C): 36.9 (06 Dec 2023 04:46), Max: 36.9 (06 Dec 2023 04:46)  T(F): 98.4 (06 Dec 2023 04:46), Max: 98.4 (06 Dec 2023 04:46)  HR: 97 (06 Dec 2023 04:46) (77 - 97)  BP: 105/71 (06 Dec 2023 04:46) (105/71 - 134/84)  BP(mean): --  RR: 19 (06 Dec 2023 04:46) (18 - 22)  SpO2: 97% (06 Dec 2023 04:46) (95% - 99%)    Parameters below as of 06 Dec 2023 04:46  Patient On (Oxygen Delivery Method): nasal cannula  O2 Flow (L/min): 2      ABG - ( 05 Dec 2023 06:15 )  pH, Arterial: 7.38  pH, Blood: x     /  pCO2: 39    /  pO2: 135   / HCO3: 23    / Base Excess: -1.8  /  SaO2: 99.4              VBG pH 7.30 12-04 @ 15:55    VBG pCO2 51 12-04 @ 15:55    VBG O2 sat 23.8 12-04 @ 15:55    VBG lactate 2.0 12-04 @ 15:55            LABS:                        9.7    6.57  )-----------( 103      ( 05 Dec 2023 07:00 )             31.6     12-05    137  |  102  |  17  ----------------------------<  199<H>  4.8   |  22  |  0.94    Ca    9.4      05 Dec 2023 07:00  Phos  3.0     12-05  Mg     2.4     12-05    TPro  6.9  /  Alb  3.4  /  TBili  0.3  /  DBili  x   /  AST  14  /  ALT  16  /  AlkPhos  70  12-05          CAPILLARY BLOOD GLUCOSE  POCT Blood Glucose.: 125 mg/dL (06 Dec 2023 08:29)    PT/INR - ( 04 Dec 2023 16:21 )   PT: 11.4 sec;   INR: 1.09 ratio         PTT - ( 04 Dec 2023 16:21 )  PTT:28.4 sec  Urinalysis Basic - ( 05 Dec 2023 07:00 )    Color: x / Appearance: x / SG: x / pH: x  Gluc: 199 mg/dL / Ketone: x  / Bili: x / Urobili: x   Blood: x / Protein: x / Nitrite: x   Leuk Esterase: x / RBC: x / WBC x   Sq Epi: x / Non Sq Epi: x / Bacteria: x                      CULTURES: (if applicable)    Culture - Blood (collected 12-04-23 @ 17:40)  Source: .Blood Blood-Peripheral  Preliminary Report (12-05-23 @ 22:02):    No growth at 24 hours    Culture - Blood (collected 12-04-23 @ 15:57)  Source: .Blood Blood-Peripheral  Preliminary Report (12-05-23 @ 22:02):    No growth at 24 hours        Culture - Urine (collected 12-04-23 @ 22:26)  Source: Clean Catch Clean Catch (Midstream)  Final Report (12-06-23 @ 07:39):    <10,000 CFU/mL Normal Urogenital Genet      Physical Examination:  PULM: Few scattered rhonchi, no wheeze   CVS: S1, S2 heard    RADIOLOGY REVIEWED      CT chest: < from: CT Chest No Cont (12.05.23 @ 20:02) >  FINDINGS:    AIRWAYS, LUNGS, PLEURA: Mild peribronchial thickening. No pleural   effusion.    Multifocal ground-glass opacities throughout all lung lobes are new   compared to 10/12/2023. Multiple additional subcentimeter nodular   opacities are unchanged; reference left apical 0.7 cm nodule (image 29,   series 3).    MEDIASTINUM: Normal heart size. No pericardial effusion. Dilated   ascending thoracic aorta measures 4.3 cm. Calcified mediastinal nodes.    IMAGED ABDOMEN: Pneumobilia and gas within the gallbladder, new compared   to 10/12/2023. Cholelithiasis.    SOFT TISSUES: Unremarkable.    BONES: No acute osseous abnormality.      IMPRESSION:.    Findings compatible with multifocal pneumonia, new compared to   10/12/2023. Recommend CT chest follow-up in 3 months to ensure clearing.    Indeterminate pulmonary nodules measuring up to 0.7 cm; recommend   attention on follow-up examination.    Pneumobilia and gas within the gallbladder, new compared to 10/12/2023;   infection should be considered as an etiology if there has been no   procedure performed (such as ERCP).    --- End of Report ---    < end of copied text >

## 2023-12-06 NOTE — PROGRESS NOTE ADULT - ASSESSMENT
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 Concern for AHRF, likely in setting of asthma exacerbation 2/2 RSV infection.      Acute respiratory failure with hypoxia.   - Ambulatory O2 sat dropped to 80s on ambulation in ER. Likely in setting of Asthma Exacerbation 2/2 RSV infection.  - CXR w/o focal consolidation   - CT Chest with PNA -GGO in B/L Lung fields, multiple nodular opacities, Dilated ascending aorta 4.3 CM, Pneumobilia and gas within the GB   - S/P Solumedrol 125mg IVP X 1 in ER for reported wheezing, transitioned to PO Prednisone 40 PO qd X 5 days w/ PPI for GI PPX   - C/w Duoneb q6h   - Incentive Spirometer   - Check Procal in AM   - Management of Asthma exacerbation as below   - Monitor respiratory status, wean supplemental O2 as tolerated (patient is not on O2 at baseline)  - Pulm evaluation appreciated; F/u recs      Acute asthma exacerbation.   - Hx of asthma, not on inhalers at home. Presented with worsening SOB/VELAZCO --> Exacerbated likely due to RSV    - S/P solumedrol 125mg IVP in ER, C/w prednisone 40mg PO x5 days  - C/w duonebs q6h   - Keep sats >90% with O2 PRN (currently 2LNC). Wean O2 as tolerated    RSV infection.   - Found to be RSV positive (12/4/23)  - C/w symptomatic management as above   - Monitor respiratory status  - Maintain isolation precautions    Pneumonia   - CT Chest as above with B/L GGO Opacities.  - Check Procal in AM   - ABX as per ID   - ID and pulm evaluations consulted and appreciated; F/u recs     NICM (nonischemic cardiomyopathy).   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 On admission, proBNP WNL and clinically euvolemic appearing.   - Not in active CHF exacerbation  - was started on ASA/statin last admission for "non-obstructive CAD" but [facility] (10/15/23) reported no disease; pt stated not taking theses medication at home, will continue to hold for now  - At home, not on previously prescribed valsartan and metoprolol/carvedilol due to drops in BP per family  - Resume antihypertensives for GDMT if able to tolerate  - C/w home Farxiga.    Anemia  - Monitor H/H closely  - Check Iron, B12, Folate, Ferritin, TIBC in AM   - Maintain Active T/S   - Transfuse for Hgb < 8.0 in view of NICM    GB Pneumobilia  - CT Chest with Pneumobilia/ Gas within GB  - Serial Abd examinations  - Pt asymptomatic LFTs WNL. Continue to monitor and trend    Multiple myeloma.   - Hx of MM (on weekly chemo/immunotherapy).   - Heme/Onc eval appreciated; F/u recs     Lung nodule   - CT Chest with multiple subcentimeter lung nodules  - Pt will require repeat CT chest in 3 months to re-assess. Discussed with Daughter and patient 12/06     Hypothyroidism.   - C/w home levothyroxine.    Prophylactic measure.   - DVT ppx: Lovenox  - Diet: Regular, low sodium  - Dispo: pending further improvement.      Discussed with Attending, Pulm, ACP and daughter at the bedside.  67M [facility]-speaking w/ hx of MM (on weekly chemo/immunotherapy), 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), hypothyroidism, asthma, presenting with SOB/VELAZCO, productive cough, fevers, chills, and severe fatigue. Concern for AHRF, likely in setting of asthma exacerbation 2/2 RSV infection.      Acute respiratory failure with hypoxia.   - Ambulatory O2 sat dropped to 80s on ambulation in ER. Likely in setting of Asthma Exacerbation 2/2 RSV infection.  - CXR w/o focal consolidation   - CT Chest with PNA -GGO in B/L Lung fields, multiple nodular opacities, Dilated ascending aorta 4.3 CM, Pneumobilia and gas within the GB   - S/P Solumedrol 125mg IVP X 1 in ER for reported wheezing, transitioned to PO Prednisone 40 PO qd X 5 days w/ PPI for GI PPX   - C/w Duoneb q6h   - Incentive Spirometer   - Check Procal in AM   - Management of Asthma exacerbation as below   - Monitor respiratory status, wean supplemental O2 as tolerated (patient is not on O2 at baseline)  - Pulm evaluation appreciated; F/u recs      Acute asthma exacerbation.   - Hx of asthma, not on inhalers at home. Presented with worsening SOB/VELAZCO --> Exacerbated likely due to RSV    - S/P solumedrol 125mg IVP in ER, C/w prednisone 40mg PO x5 days  - C/w duonebs q6h   - Keep sats >90% with O2 PRN (currently 2LNC). Wean O2 as tolerated    RSV infection.   - Found to be RSV positive (12/4/23)  - C/w symptomatic management as above   - Monitor respiratory status  - Maintain isolation precautions    Pneumonia   - CT Chest as above with B/L GGO Opacities.  - Check Procal in AM   - ABX as per ID   - ID and pulm evaluations consulted and appreciated; F/u recs     NICM (nonischemic cardiomyopathy).   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 On admission, proBNP WNL and clinically euvolemic appearing.   - Not in active CHF exacerbation  - was started on ASA/statin last admission for "non-obstructive CAD" but [facility] (10/15/23) reported no disease; pt stated not taking theses medication at home, will continue to hold for now  - At home, not on previously prescribed valsartan and metoprolol/carvedilol due to drops in BP per family  - Resume antihypertensives for GDMT if able to tolerate  - C/w home Farxiga.    Anemia  - Monitor H/H closely  - Check Iron, B12, Folate, Ferritin, TIBC in AM   - Maintain Active T/S   - Transfuse for Hgb < 8.0 in view of NICM    GB Pneumobilia  - CT Chest with Pneumobilia/ Gas within GB  - Serial Abd examinations  - Pt asymptomatic LFTs WNL. Continue to monitor and trend    Multiple myeloma.   - Hx of MM (on weekly chemo/immunotherapy).   - Heme/Onc eval appreciated; F/u recs     Lung nodule   - CT Chest with multiple subcentimeter lung nodules  - Pt will require repeat CT chest in 3 months to re-assess. Discussed with Daughter and patient 12/06     Hypothyroidism.   - C/w home levothyroxine.    Prophylactic measure.   - DVT ppx: Lovenox  - Diet: Regular, low sodium  - Dispo: pending further improvement.      Discussed with Attending, Pulm, ACP and daughter at the bedside.  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 Concern for AHRF, likely in setting of asthma exacerbation 2/2 RSV infection.      Acute respiratory failure with hypoxia.   - Ambulatory O2 sat dropped to 80s on ambulation in ER. Likely in setting of Asthma Exacerbation 2/2 RSV infection.  - CXR w/o focal consolidation   - CT Chest with PNA -GGO in B/L Lung fields, multiple nodular opacities, Dilated ascending aorta 4.3 CM, Pneumobilia and gas within the GB   - S/P Solumedrol 125mg IVP X 1 in ER for reported wheezing, transitioned to PO Prednisone 40 PO qd X 5 days w/ PPI for GI PPX   - C/w Duoneb q6h   - Incentive Spirometer   - Check Procal in AM   - Check Sputum culture   - Management of Asthma exacerbation as below   - Monitor respiratory status, wean supplemental O2 as tolerated (patient is not on O2 at baseline)  - Pulm evaluation appreciated; F/u recs      Acute asthma exacerbation.   - Hx of asthma, not on inhalers at home. Presented with worsening SOB/VELAZCO --> Exacerbated likely due to RSV    - S/P solumedrol 125mg IVP in ER, C/w prednisone 40mg PO x5 days  - C/w duonebs q6h   - Keep sats >90% with O2 PRN (currently 2LNC). Wean O2 as tolerated    RSV infection.   - Found to be RSV positive (12/4/23)  - C/w symptomatic management as above   - Monitor respiratory status  - Maintain isolation precautions    Pneumonia   - CT Chest as above with B/L GGO Opacities.  - Check Procal in AM   - ABX as per ID   - ID and pulm evaluations consulted and appreciated; F/u recs     NICM (nonischemic cardiomyopathy).   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 On admission, proBNP WNL and clinically euvolemic appearing.   - Not in active CHF exacerbation  - was started on ASA/statin last admission for "non-obstructive CAD" but [facility] (10/15/23) reported no disease; pt stated not taking theses medication at home, will continue to hold for now  - At home, not on previously prescribed valsartan and metoprolol/carvedilol due to drops in BP per family  - Resume antihypertensives for GDMT if able to tolerate  - C/w home Farxiga.    Anemia  - Monitor H/H closely  - Check Iron, B12, Folate, Ferritin, TIBC in AM   - Maintain Active T/S   - Transfuse for Hgb < 8.0 in view of NICM    GB Pneumobilia  - CT Chest with Pneumobilia/ Gas within GB  - Serial Abd examinations  - Check CT A/P w/ IV contrast   - Start Zosyn as per ID   - Pt asymptomatic LFTs WNL. Continue to monitor and trend    Multiple myeloma.   - Hx of MM (on weekly chemo/immunotherapy).   - Heme/Onc eval appreciated; F/u recs     Lung nodule   - CT Chest with multiple subcentimeter lung nodules  - Pt will require repeat CT chest in 3 months to re-assess. Discussed with Daughter and patient 12/06     Hypothyroidism.   - C/w home levothyroxine.    Prophylactic measure.   - DVT ppx: Lovenox  - Diet: Regular, low sodium  - Dispo: pending further improvement.      Discussed with Attending, Pulm, ACP and daughter at the bedside.  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 Concern for AHRF, likely in setting of asthma exacerbation 2/2 RSV infection.      Acute respiratory failure with hypoxia.   - Ambulatory O2 sat dropped to 80s on ambulation in ER. Likely in setting of Asthma Exacerbation 2/2 RSV infection.  - CXR w/o focal consolidation   - CT Chest with PNA -GGO in B/L Lung fields, multiple nodular opacities, Dilated ascending aorta 4.3 CM, Pneumobilia and gas within the GB   - S/P Solumedrol 125mg IVP X 1 in ER for reported wheezing, transitioned to PO Prednisone 40 PO qd X 5 days w/ PPI for GI PPX   - C/w Duoneb q6h   - Incentive Spirometer   - Check Procal in AM   - Check Sputum culture   - Management of Asthma exacerbation as below   - Monitor respiratory status, wean supplemental O2 as tolerated (patient is not on O2 at baseline)  - Pulm evaluation appreciated; F/u recs      Acute asthma exacerbation.   - Hx of asthma, not on inhalers at home. Presented with worsening SOB/VELAZCO --> Exacerbated likely due to RSV    - S/P solumedrol 125mg IVP in ER, C/w prednisone 40mg PO x5 days  - C/w duonebs q6h   - Keep sats >90% with O2 PRN (currently 2LNC). Wean O2 as tolerated    RSV infection.   - Found to be RSV positive (12/4/23)  - C/w symptomatic management as above   - Monitor respiratory status  - Maintain isolation precautions    Pneumonia   - CT Chest as above with B/L GGO Opacities.  - Check Procal in AM   - ABX as per ID   - ID and pulm evaluations consulted and appreciated; F/u recs     NICM (nonischemic cardiomyopathy).   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 On admission, proBNP WNL and clinically euvolemic appearing.   - Not in active CHF exacerbation  - was started on ASA/statin last admission for "non-obstructive CAD" but [facility] (10/15/23) reported no disease; pt stated not taking theses medication at home, will continue to hold for now  - At home, not on previously prescribed valsartan and metoprolol/carvedilol due to drops in BP per family  - Resume antihypertensives for GDMT if able to tolerate  - C/w home Farxiga.    Anemia  - Monitor H/H closely  - Check Iron, B12, Folate, Ferritin, TIBC in AM   - Maintain Active T/S   - Transfuse for Hgb < 8.0 in view of NICM    GB Pneumobilia  - CT Chest with Pneumobilia/ Gas within GB  - Serial Abd examinations  - Check CT A/P w/ IV contrast   - Start Zosyn as per ID   - Pt asymptomatic LFTs WNL. Continue to monitor and trend    Multiple myeloma.   - Hx of MM (on weekly chemo/immunotherapy).   - Heme/Onc eval appreciated; F/u recs     Lung nodule   - CT Chest with multiple subcentimeter lung nodules  - Pt will require repeat CT chest in 3 months to re-assess. Discussed with Daughter and patient 12/06     Hypothyroidism.   - C/w home levothyroxine.    Prophylactic measure.   - DVT ppx: Lovenox  - Diet: Regular, low sodium  - Dispo: pending further improvement.      Discussed with Attending, Pulm, ACP and daughter at the bedside.  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 Concern for AHRF, likely in setting of asthma exacerbation 2/2 RSV infection.      Acute respiratory failure with hypoxia.   - Ambulatory O2 sat dropped to 80s on ambulation in ER. Likely in setting of Asthma Exacerbation 2/2 RSV infection.  - CXR w/o focal consolidation   - CT Chest with PNA -GGO in B/L Lung fields, multiple nodular opacities, Dilated ascending aorta 4.3 CM, Pneumobilia and gas within the GB   - S/P Solumedrol 125mg IVP X 1 in ER for reported wheezing, transitioned to PO Prednisone 40 PO qd X 5 days w/ PPI for GI PPX   - C/w Duoneb q6h   - Incentive Spirometer   - Check Procal in AM   - Check Sputum culture   - Management of Asthma exacerbation as below   - Monitor respiratory status, wean supplemental O2 as tolerated (patient is not on O2 at baseline)  - Pulm evaluation appreciated; F/u recs      Acute asthma exacerbation.   - Hx of asthma, not on inhalers at home. Presented with worsening SOB/VELAZCO --> Exacerbated likely due to RSV    - S/P solumedrol 125mg IVP in ER, C/w prednisone 40mg PO x5 days  - C/w duonebs q6h   - Keep sats >90% with O2 PRN (currently 2LNC). Wean O2 as tolerated    RSV infection.   - Found to be RSV positive (12/4/23)  - C/w symptomatic management as above   - Monitor respiratory status  - Maintain isolation precautions    Pneumonia   - CT Chest as above with B/L GGO Opacities.  - Check Procal in AM   - ABX as per ID   - ID and pulm evaluations consulted and appreciated; F/u recs     NICM (nonischemic cardiomyopathy).   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 On admission, proBNP WNL and clinically euvolemic appearing.   - Not in active CHF exacerbation  - was started on ASA/statin last admission for "non-obstructive CAD" but [facility] (10/15/23) reported no disease; pt stated not taking theses medication at home, will continue to hold for now  - At home, not on previously prescribed valsartan and metoprolol/carvedilol due to drops in BP per family  - Resume antihypertensives for GDMT if able to tolerate pending repeat TTE  - Check Repeat TTE   - C/w home Farxiga.    Anemia  - Monitor H/H closely  - Check Iron, B12, Folate, Ferritin, TIBC in AM   - Maintain Active T/S   - Transfuse for Hgb < 8.0 in view of NICM    GB Pneumobilia  - CT Chest with Pneumobilia/ Gas within GB  - Serial Abd examinations  - Check CT A/P w/ IV contrast   - Start Zosyn as per ID   - Pt asymptomatic LFTs WNL. Continue to monitor and trend    Multiple myeloma.   - Hx of MM (on weekly chemo/immunotherapy).   - Heme/Onc eval appreciated; F/u recs     Lung nodule   - CT Chest with multiple subcentimeter lung nodules  - Pt will require repeat CT chest in 3 months to re-assess. Discussed with Daughter and patient 12/06     Dilated Ascending Thoracic Aorta  - BP control  - Repeat CT in 3-6 months as an outpatient    Hypothyroidism.   - C/w home levothyroxine.    Prophylactic measure.   - DVT ppx: Lovenox  - Diet: Regular, low sodium  - Dispo: pending further improvement.      Discussed with Attending, Pulm, ACP and daughter at the bedside.  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 Concern for AHRF, likely in setting of asthma exacerbation 2/2 RSV infection.      Acute respiratory failure with hypoxia.   - Ambulatory O2 sat dropped to 80s on ambulation in ER. Likely in setting of Asthma Exacerbation 2/2 RSV infection.  - CXR w/o focal consolidation   - CT Chest with PNA -GGO in B/L Lung fields, multiple nodular opacities, Dilated ascending aorta 4.3 CM, Pneumobilia and gas within the GB   - S/P Solumedrol 125mg IVP X 1 in ER for reported wheezing, transitioned to PO Prednisone 40 PO qd X 5 days w/ PPI for GI PPX   - C/w Duoneb q6h   - Incentive Spirometer   - Check Procal in AM   - Check Sputum culture   - Management of Asthma exacerbation as below   - Monitor respiratory status, wean supplemental O2 as tolerated (patient is not on O2 at baseline)  - Pulm evaluation appreciated; F/u recs      Acute asthma exacerbation.   - Hx of asthma, not on inhalers at home. Presented with worsening SOB/VELAZCO --> Exacerbated likely due to RSV    - S/P solumedrol 125mg IVP in ER, C/w prednisone 40mg PO x5 days  - C/w duonebs q6h   - Keep sats >90% with O2 PRN (currently 2LNC). Wean O2 as tolerated    RSV infection.   - Found to be RSV positive (12/4/23)  - C/w symptomatic management as above   - Monitor respiratory status  - Maintain isolation precautions    Pneumonia   - CT Chest as above with B/L GGO Opacities.  - Check Procal in AM   - ABX as per ID   - ID and pulm evaluations consulted and appreciated; F/u recs     NICM (nonischemic cardiomyopathy).   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 On admission, proBNP WNL and clinically euvolemic appearing.   - Not in active CHF exacerbation  - was started on ASA/statin last admission for "non-obstructive CAD" but [facility] (10/15/23) reported no disease; pt stated not taking theses medication at home, will continue to hold for now  - At home, not on previously prescribed valsartan and metoprolol/carvedilol due to drops in BP per family  - Resume antihypertensives for GDMT if able to tolerate pending repeat TTE  - Check Repeat TTE   - C/w home Farxiga.    Anemia  - Monitor H/H closely  - Check Iron, B12, Folate, Ferritin, TIBC in AM   - Maintain Active T/S   - Transfuse for Hgb < 8.0 in view of NICM    GB Pneumobilia  - CT Chest with Pneumobilia/ Gas within GB  - Serial Abd examinations  - Check CT A/P w/ IV contrast   - Start Zosyn as per ID   - Pt asymptomatic LFTs WNL. Continue to monitor and trend    Multiple myeloma.   - Hx of MM (on weekly chemo/immunotherapy).   - Heme/Onc eval appreciated; F/u recs     Lung nodule   - CT Chest with multiple subcentimeter lung nodules  - Pt will require repeat CT chest in 3 months to re-assess. Discussed with Daughter and patient 12/06     Dilated Ascending Thoracic Aorta  - BP control  - Repeat CT in 3-6 months as an outpatient    Hypothyroidism.   - C/w home levothyroxine.    Prophylactic measure.   - DVT ppx: Lovenox  - Diet: Regular, low sodium  - Dispo: pending further improvement.      Discussed with Attending, Pulm, ACP and daughter at the bedside.

## 2023-12-06 NOTE — PROGRESS NOTE ADULT - PROBLEM SELECTOR PLAN 2
CT chest with b/l opacities, viral vs bacterial  -RVP + RSV  -May consider covering with ABX given patient's hx  -Check procalcitonin. -CT chest with b/l opacities, viral vs bacterial.   -RVP + RSV  -Favor covering with ABX given patient's history.   -Check procalcitonin  -Suggest ID eval.

## 2023-12-06 NOTE — PROGRESS NOTE ADULT - SUBJECTIVE AND OBJECTIVE BOX
Ino Davison MD  Interventional Cardiology / Endovascular Specialist  [place] Office : [address] [place] [address] [place] [address]  Tel:   [place] Office : [address] [facility] [place] [address]  Tel: [phone]    Pt lying in bed in NAD denies   	  MEDICATIONS:  enoxaparin Injectable 40 milliGRAM(s) SubCutaneous every 24 hours    piperacillin/tazobactam IVPB.. 3.375 Gram(s) IV Intermittent every 8 hours    albuterol    90 MICROgram(s) HFA Inhaler 2 Puff(s) Inhalation every 6 hours  albuterol/ipratropium for Nebulization 3 milliLiter(s) Nebulizer every 6 hours  benzonatate 100 milliGRAM(s) Oral every 8 hours  guaiFENesin Oral Liquid (Sugar-Free) 200 milliGRAM(s) Oral every 6 hours PRN  loratadine 10 milliGRAM(s) Oral daily    acetaminophen     Tablet .. 650 milliGRAM(s) Oral every 6 hours PRN    aluminum hydroxide/magnesium hydroxide/simethicone Suspension 30 milliLiter(s) Oral every 4 hours PRN  famotidine    Tablet 40 milliGRAM(s) Oral daily  pantoprazole    Tablet 40 milliGRAM(s) Oral before breakfast  senna 2 Tablet(s) Oral at bedtime    dapagliflozin 10 milliGRAM(s) Oral every 24 hours  dextrose 50% Injectable 25 Gram(s) IV Push once  dextrose 50% Injectable 12.5 Gram(s) IV Push once  dextrose 50% Injectable 25 Gram(s) IV Push once  dextrose Oral Gel 15 Gram(s) Oral once PRN  glucagon  Injectable 1 milliGRAM(s) IntraMuscular once  insulin lispro (ADMELOG) corrective regimen sliding scale   SubCutaneous three times a day before meals  insulin lispro (ADMELOG) corrective regimen sliding scale   SubCutaneous at bedtime  levothyroxine 50 MICROGram(s) Oral daily  predniSONE   Tablet 40 milliGRAM(s) Oral daily    dextrose 5%. 1000 milliLiter(s) IV Continuous <Continuous>  dextrose 5%. 1000 milliLiter(s) IV Continuous <Continuous>  multivitamin 1 Tablet(s) Oral daily      PAST MEDICAL/SURGICAL HISTORY  PAST MEDICAL & SURGICAL HISTORY:  Hypothyroidism      Multiple myeloma      Asthma      No significant past surgical history          SOCIAL HISTORY: Substance Use (street drugs): ( x ) never used  (  ) other:    FAMILY HISTORY:  FHx: leukemia        REVIEW OF SYSTEMS:  CONSTITUTIONAL: No fever, weight loss, or fatigue  EYES: No eye pain, visual disturbances, or discharge  ENMT:  No difficulty hearing, tinnitus, vertigo; No sinus or throat pain  BREASTS: No pain, masses, or nipple discharge  GASTROINTESTINAL: No abdominal or epigastric pain. No nausea, vomiting, or hematemesis; No diarrhea or constipation. No melena or hematochezia.  GENITOURINARY: No dysuria, frequency, hematuria, or incontinence  NEUROLOGICAL: No headaches, memory loss, loss of strength, numbness, or tremors  ENDOCRINE: No heat or cold intolerance; No hair loss  MUSCULOSKELETAL: No joint pain or swelling; No muscle, back, or extremity pain  PSYCHIATRIC: No depression, anxiety, mood swings, or difficulty sleeping  HEME/LYMPH: No easy bruising, or bleeding gums  All others negative    PHYSICAL EXAM:  T(C): 36.6 (12-07-23 @ 20:42), Max: 37 (12-07-23 @ 12:09)  HR: 98 (12-07-23 @ 20:42) (88 - 98)  BP: 116/83 (12-07-23 @ 20:42) (111/71 - 116/83)  RR: 18 (12-07-23 @ 20:42) (18 - 18)  SpO2: 93% (12-07-23 @ 20:42) (93% - 97%)  Wt(kg): --  I&O's Summary    06 Dec 2023 07:01  -  07 Dec 2023 07:00  --------------------------------------------------------  IN: 240 mL / OUT: 2800 mL / NET: -2560 mL    07 Dec 2023 07:01  -  07 Dec 2023 23:00  --------------------------------------------------------  IN: 100 mL / OUT: 350 mL / NET: -250 mL    GENERAL: NAD febrile   EYES: conjunctiva and sclera clear  ENMT: No tonsillar erythema, exudates, or enlargement   Cardiovascular: Normal S1 S2, No JVD, No murmurs  Respiratory: B/L basal ronchi   Gastrointestinal:  Soft, Non-tender, + BS	  Extremities: No edema                              9.3    6.51  )-----------( 106      ( 07 Dec 2023 07:15 )             29.2     12-07    144  |  105  |  24<H>  ----------------------------<  86  4.0   |  23  |  1.16    Ca    9.0      07 Dec 2023 07:15  Phos  3.5     12-07  Mg     2.4     12-07    TPro  6.3  /  Alb  3.3  /  TBili  0.3  /  DBili  x   /  AST  12  /  ALT  18  /  AlkPhos  58  12-07    proBNP:   Lipid Profile:   HgA1c:   TSH:     Consultant(s) Notes Reviewed:  [x ] YES  [ ] NO    Care Discussed with Consultants/Other Providers [ x] YES  [ ] NO    Imaging Personally Reviewed independently:  [x] YES  [ ] NO    All labs, radiologic studies, vitals, orders and medications list reviewed. Patient is seen and examined at bedside. Case discussed with medical team.                 Ino Davison MD  Interventional Cardiology / Endovascular Specialist  [place] Office : [phone] [place] [address] [place] [address]  Tel:   [place] Office : [address] [facility] [place] [address]  Tel: [phone]    Pt lying in bed in NAD denies   	  MEDICATIONS:  enoxaparin Injectable 40 milliGRAM(s) SubCutaneous every 24 hours    piperacillin/tazobactam IVPB.. 3.375 Gram(s) IV Intermittent every 8 hours    albuterol    90 MICROgram(s) HFA Inhaler 2 Puff(s) Inhalation every 6 hours  albuterol/ipratropium for Nebulization 3 milliLiter(s) Nebulizer every 6 hours  benzonatate 100 milliGRAM(s) Oral every 8 hours  guaiFENesin Oral Liquid (Sugar-Free) 200 milliGRAM(s) Oral every 6 hours PRN  loratadine 10 milliGRAM(s) Oral daily    acetaminophen     Tablet .. 650 milliGRAM(s) Oral every 6 hours PRN    aluminum hydroxide/magnesium hydroxide/simethicone Suspension 30 milliLiter(s) Oral every 4 hours PRN  famotidine    Tablet 40 milliGRAM(s) Oral daily  pantoprazole    Tablet 40 milliGRAM(s) Oral before breakfast  senna 2 Tablet(s) Oral at bedtime    dapagliflozin 10 milliGRAM(s) Oral every 24 hours  dextrose 50% Injectable 25 Gram(s) IV Push once  dextrose 50% Injectable 12.5 Gram(s) IV Push once  dextrose 50% Injectable 25 Gram(s) IV Push once  dextrose Oral Gel 15 Gram(s) Oral once PRN  glucagon  Injectable 1 milliGRAM(s) IntraMuscular once  insulin lispro (ADMELOG) corrective regimen sliding scale   SubCutaneous three times a day before meals  insulin lispro (ADMELOG) corrective regimen sliding scale   SubCutaneous at bedtime  levothyroxine 50 MICROGram(s) Oral daily  predniSONE   Tablet 40 milliGRAM(s) Oral daily    dextrose 5%. 1000 milliLiter(s) IV Continuous <Continuous>  dextrose 5%. 1000 milliLiter(s) IV Continuous <Continuous>  multivitamin 1 Tablet(s) Oral daily      PAST MEDICAL/SURGICAL HISTORY  PAST MEDICAL & SURGICAL HISTORY:  Hypothyroidism      Multiple myeloma      Asthma      No significant past surgical history          SOCIAL HISTORY: Substance Use (street drugs): ( x ) never used  (  ) other:    FAMILY HISTORY:  FHx: leukemia        REVIEW OF SYSTEMS:  CONSTITUTIONAL: No fever, weight loss, or fatigue  EYES: No eye pain, visual disturbances, or discharge  ENMT:  No difficulty hearing, tinnitus, vertigo; No sinus or throat pain  BREASTS: No pain, masses, or nipple discharge  GASTROINTESTINAL: No abdominal or epigastric pain. No nausea, vomiting, or hematemesis; No diarrhea or constipation. No melena or hematochezia.  GENITOURINARY: No dysuria, frequency, hematuria, or incontinence  NEUROLOGICAL: No headaches, memory loss, loss of strength, numbness, or tremors  ENDOCRINE: No heat or cold intolerance; No hair loss  MUSCULOSKELETAL: No joint pain or swelling; No muscle, back, or extremity pain  PSYCHIATRIC: No depression, anxiety, mood swings, or difficulty sleeping  HEME/LYMPH: No easy bruising, or bleeding gums  All others negative    PHYSICAL EXAM:  T(C): 36.6 (12-07-23 @ 20:42), Max: 37 (12-07-23 @ 12:09)  HR: 98 (12-07-23 @ 20:42) (88 - 98)  BP: 116/83 (12-07-23 @ 20:42) (111/71 - 116/83)  RR: 18 (12-07-23 @ 20:42) (18 - 18)  SpO2: 93% (12-07-23 @ 20:42) (93% - 97%)  Wt(kg): --  I&O's Summary    06 Dec 2023 07:01  -  07 Dec 2023 07:00  --------------------------------------------------------  IN: 240 mL / OUT: 2800 mL / NET: -2560 mL    07 Dec 2023 07:01  -  07 Dec 2023 23:00  --------------------------------------------------------  IN: 100 mL / OUT: 350 mL / NET: -250 mL    GENERAL: NAD febrile   EYES: conjunctiva and sclera clear  ENMT: No tonsillar erythema, exudates, or enlargement   Cardiovascular: Normal S1 S2, No JVD, No murmurs  Respiratory: B/L basal ronchi   Gastrointestinal:  Soft, Non-tender, + BS	  Extremities: No edema                              9.3    6.51  )-----------( 106      ( 07 Dec 2023 07:15 )             29.2     12-07    144  |  105  |  24<H>  ----------------------------<  86  4.0   |  23  |  1.16    Ca    9.0      07 Dec 2023 07:15  Phos  3.5     12-07  Mg     2.4     12-07    TPro  6.3  /  Alb  3.3  /  TBili  0.3  /  DBili  x   /  AST  12  /  ALT  18  /  AlkPhos  58  12-07    proBNP:   Lipid Profile:   HgA1c:   TSH:     Consultant(s) Notes Reviewed:  [x ] YES  [ ] NO    Care Discussed with Consultants/Other Providers [ x] YES  [ ] NO    Imaging Personally Reviewed independently:  [x] YES  [ ] NO    All labs, radiologic studies, vitals, orders and medications list reviewed. Patient is seen and examined at bedside. Case discussed with medical team.

## 2023-12-06 NOTE — PROGRESS NOTE ADULT - SUBJECTIVE AND OBJECTIVE BOX
Name of Patient : CARMEN BERNSTEIN  MRN: [id number]  Date of visit: 12-06-23 @ 13:12      Subjective: Patient seen and examined. No new events except as noted.   Patient seen earlier this AM. Sitting up in bed. Daughter at the bedside.  Patient reports ongoing cough and VELAZCO. Denies SOB at rest.   Asking for multi-vitamin.   Denies chest pain, palpitations, shortness of breath or dyspnea.   Denies abdominal pain or discomfort. Denies nausea or vomiting.     REVIEW OF SYSTEMS:    CONSTITUTIONAL: Generalized weakness; Afebrile   EYES/ENT: No visual changes;  No vertigo or throat pain   NECK: No pain or stiffness  RESPIRATORY: +Ongoing cough and VELAZCO; Denies SOB at rest; On NC   CARDIOVASCULAR: No chest pain or palpitations  GASTROINTESTINAL: No abdominal or epigastric pain. No tenderness. No nausea, vomiting, or hematemesis; No diarrhea or constipation. No melena or hematochezia.  GENITOURINARY: No dysuria, frequency or hematuria  NEUROLOGICAL: No numbness or weakness  SKIN: No itching, burning, rashes, or lesions   All other review of systems is negative unless indicated above.    MEDICATIONS:  MEDICATIONS  (STANDING):  albuterol    90 MICROgram(s) HFA Inhaler 2 Puff(s) Inhalation every 6 hours  albuterol/ipratropium for Nebulization 3 milliLiter(s) Nebulizer every 6 hours  benzonatate 100 milliGRAM(s) Oral every 8 hours  dapagliflozin 10 milliGRAM(s) Oral every 24 hours  dextrose 5%. 1000 milliLiter(s) (50 mL/Hr) IV Continuous <Continuous>  dextrose 5%. 1000 milliLiter(s) (100 mL/Hr) IV Continuous <Continuous>  dextrose 50% Injectable 25 Gram(s) IV Push once  dextrose 50% Injectable 25 Gram(s) IV Push once  dextrose 50% Injectable 12.5 Gram(s) IV Push once  enoxaparin Injectable 40 milliGRAM(s) SubCutaneous every 24 hours  famotidine    Tablet 40 milliGRAM(s) Oral daily  glucagon  Injectable 1 milliGRAM(s) IntraMuscular once  insulin lispro (ADMELOG) corrective regimen sliding scale   SubCutaneous three times a day before meals  insulin lispro (ADMELOG) corrective regimen sliding scale   SubCutaneous at bedtime  levothyroxine 50 MICROGram(s) Oral daily  loratadine 10 milliGRAM(s) Oral daily  predniSONE   Tablet 40 milliGRAM(s) Oral daily  senna 2 Tablet(s) Oral at bedtime      PHYSICAL EXAM:  T(C): 36.6 (12-06-23 @ 13:03), Max: 36.9 (12-06-23 @ 04:46)  HR: 91 (12-06-23 @ 13:03) (91 - 97)  BP: 122/81 (12-06-23 @ 13:03) (105/71 - 123/82)  RR: 18 (12-06-23 @ 13:03) (18 - 19)  SpO2: 94% (12-06-23 @ 13:03) (94% - 97%)  Wt(kg): --  I&O's Summary    06 Dec 2023 07:01  -  06 Dec 2023 13:12  --------------------------------------------------------  IN: 0 mL / OUT: 1000 mL / NET: -1000 mL          Appearance: Awake, sitting up in bed, on NC   HEENT:  Eyes are open; NC   Lymphatic: No lymphadenopathy grossly   Cardiovascular: Normal S1 S2   Respiratory: + Rhonchi B/L; No Wheezing   Gastrointestinal:  Soft, Non-tender to palpitation throughout abdomen or RUQ; No Rebound, No guarding, Negative Herr's sign   Skin: No rashes,  warm to touch  Psychiatry:  Mood & affect appropriate  Musculoskeletal: No edema      12-06-23 @ 07:01  -  12-06-23 @ 13:12  --------------------------------------------------------  IN: 0 mL / OUT: 1000 mL / NET: -1000 mL                                  9.7    6.57  )-----------( 103      ( 05 Dec 2023 07:00 )             31.6               12-05    137  |  102  |  17  ----------------------------<  199<H>  4.8   |  22  |  0.94    Ca    9.4      05 Dec 2023 07:00  Phos  3.0     12-05  Mg     2.4     12-05    TPro  6.9  /  Alb  3.4  /  TBili  0.3  /  DBili  x   /  AST  14  /  ALT  16  /  AlkPhos  70  12-05    PT/INR - ( 04 Dec 2023 16:21 )   PT: 11.4 sec;   INR: 1.09 ratio         PTT - ( 04 Dec 2023 16:21 )  PTT:28.4 sec                 ABG - ( 05 Dec 2023 06:15 )  pH, Arterial: 7.38  pH, Blood: x     /  pCO2: 39    /  pO2: 135   / HCO3: 23    / Base Excess: -1.8  /  SaO2: 99.4        Urinalysis Basic - ( 05 Dec 2023 07:00 )    Color: x / Appearance: x / SG: x / pH: x  Gluc: 199 mg/dL / Ketone: x  / Bili: x / Urobili: x   Blood: x / Protein: x / Nitrite: x   Leuk Esterase: x / RBC: x / WBC x   Sq Epi: x / Non Sq Epi: x / Bacteria: x      Culture - Urine (12.04.23 @ 22:26)   Specimen Source: Clean Catch Clean Catch (Midstream)  Culture Results: <10,000 CFU/mL Normal Urogenital Genet    Culture - Blood (12.04.23 @ 17:40)   Specimen Source: .Blood Blood-Peripheral  Culture Results: No growth at 24 hours    Culture - Blood (12.04.23 @ 15:57)   Specimen Source: .Blood Blood-Peripheral  Culture Results: No growth at 24 hours    < from: CT Chest No Cont (12.05.23 @ 20:02) >    ACC: [id number] EXAM:  CT CHEST   ORDERED BY: ANIA JENSEN     PROCEDURE DATE:  12/05/2023          INTERPRETATION:  HISTORY: Admitting Dxs: R06.02 FEVER    EXAMINATION: CT CHEST was performed without IV contrast.    COMPARISON: 10/12/2023.    FINDINGS:    AIRWAYS, LUNGS, PLEURA: Mild peribronchial thickening. No pleural   effusion.    Multifocal ground-glass opacities throughout all lung lobes are new   compared to 10/12/2023. Multiple additional subcentimeter nodular   opacities are unchanged; reference left apical 0.7 cm nodule (image 29,   series 3).    MEDIASTINUM: Normal heart size. No pericardial effusion. Dilated   ascending thoracic aorta measures 4.3 cm. Calcified mediastinal nodes.    IMAGED ABDOMEN: Pneumobilia and gas within the gallbladder, new compared   to 10/12/2023. Cholelithiasis.    SOFT TISSUES: Unremarkable.    BONES: No acute osseous abnormality.      IMPRESSION:.    Findings compatible with multifocal pneumonia, new compared to   10/12/2023. Recommend CT chest follow-up in 3 months to ensure clearing.    Indeterminate pulmonary nodules measuring up to 0.7 cm; recommend   attention on follow-up examination.    Pneumobilia and gas within the gallbladder, new compared to 10/12/2023;   infection should be considered as an etiology if there has been no   procedure performed (such as ERCP).    --- End of Report ---             BARAK SOTO MD; Attending Radiologist  This document has been electronically signed. Dec  6 2023  6:07AM    < end of copied text >   Name of Patient : CARMEN BERNSTEIN  MRN: [id number]  Date of visit: 12-06-23 @ 13:12      Subjective: Patient seen and examined. No new events except as noted.   Patient seen earlier this AM. Sitting up in bed. Daughter at the bedside.  Patient reports ongoing cough and VELAZCO. Denies SOB at rest.   Asking for multi-vitamin.   Denies chest pain, palpitations, shortness of breath or dyspnea.   Denies abdominal pain or discomfort. Denies nausea or vomiting.     REVIEW OF SYSTEMS:    CONSTITUTIONAL: Generalized weakness; Afebrile   EYES/ENT: No visual changes;  No vertigo or throat pain   NECK: No pain or stiffness  RESPIRATORY: +Ongoing cough and EVLAZCO; Denies SOB at rest; On NC   CARDIOVASCULAR: No chest pain or palpitations  GASTROINTESTINAL: No abdominal or epigastric pain. No tenderness. No nausea, vomiting, or hematemesis; No diarrhea or constipation. No melena or hematochezia.  GENITOURINARY: No dysuria, frequency or hematuria  NEUROLOGICAL: No numbness or weakness  SKIN: No itching, burning, rashes, or lesions   All other review of systems is negative unless indicated above.    MEDICATIONS:  MEDICATIONS  (STANDING):  albuterol    90 MICROgram(s) HFA Inhaler 2 Puff(s) Inhalation every 6 hours  albuterol/ipratropium for Nebulization 3 milliLiter(s) Nebulizer every 6 hours  benzonatate 100 milliGRAM(s) Oral every 8 hours  dapagliflozin 10 milliGRAM(s) Oral every 24 hours  dextrose 5%. 1000 milliLiter(s) (50 mL/Hr) IV Continuous <Continuous>  dextrose 5%. 1000 milliLiter(s) (100 mL/Hr) IV Continuous <Continuous>  dextrose 50% Injectable 25 Gram(s) IV Push once  dextrose 50% Injectable 25 Gram(s) IV Push once  dextrose 50% Injectable 12.5 Gram(s) IV Push once  enoxaparin Injectable 40 milliGRAM(s) SubCutaneous every 24 hours  famotidine    Tablet 40 milliGRAM(s) Oral daily  glucagon  Injectable 1 milliGRAM(s) IntraMuscular once  insulin lispro (ADMELOG) corrective regimen sliding scale   SubCutaneous three times a day before meals  insulin lispro (ADMELOG) corrective regimen sliding scale   SubCutaneous at bedtime  levothyroxine 50 MICROGram(s) Oral daily  loratadine 10 milliGRAM(s) Oral daily  predniSONE   Tablet 40 milliGRAM(s) Oral daily  senna 2 Tablet(s) Oral at bedtime      PHYSICAL EXAM:  T(C): 36.6 (12-06-23 @ 13:03), Max: 36.9 (12-06-23 @ 04:46)  HR: 91 (12-06-23 @ 13:03) (91 - 97)  BP: 122/81 (12-06-23 @ 13:03) (105/71 - 123/82)  RR: 18 (12-06-23 @ 13:03) (18 - 19)  SpO2: 94% (12-06-23 @ 13:03) (94% - 97%)  Wt(kg): --  I&O's Summary    06 Dec 2023 07:01  -  06 Dec 2023 13:12  --------------------------------------------------------  IN: 0 mL / OUT: 1000 mL / NET: -1000 mL          Appearance: Awake, sitting up in bed, on NC   HEENT:  Eyes are open; NC   Lymphatic: No lymphadenopathy grossly   Cardiovascular: Normal S1 S2   Respiratory: + Rhonchi B/L; No Wheezing   Gastrointestinal:  Soft, Non-tender to palpitation throughout abdomen or RUQ; No Rebound, No guarding, Negative Herr's sign   Skin: No rashes,  warm to touch  Psychiatry:  Mood & affect appropriate  Musculoskeletal: No edema      12-06-23 @ 07:01  -  12-06-23 @ 13:12  --------------------------------------------------------  IN: 0 mL / OUT: 1000 mL / NET: -1000 mL                                  9.7    6.57  )-----------( 103      ( 05 Dec 2023 07:00 )             31.6               12-05    137  |  102  |  17  ----------------------------<  199<H>  4.8   |  22  |  0.94    Ca    9.4      05 Dec 2023 07:00  Phos  3.0     12-05  Mg     2.4     12-05    TPro  6.9  /  Alb  3.4  /  TBili  0.3  /  DBili  x   /  AST  14  /  ALT  16  /  AlkPhos  70  12-05    PT/INR - ( 04 Dec 2023 16:21 )   PT: 11.4 sec;   INR: 1.09 ratio         PTT - ( 04 Dec 2023 16:21 )  PTT:28.4 sec                 ABG - ( 05 Dec 2023 06:15 )  pH, Arterial: 7.38  pH, Blood: x     /  pCO2: 39    /  pO2: 135   / HCO3: 23    / Base Excess: -1.8  /  SaO2: 99.4        Urinalysis Basic - ( 05 Dec 2023 07:00 )    Color: x / Appearance: x / SG: x / pH: x  Gluc: 199 mg/dL / Ketone: x  / Bili: x / Urobili: x   Blood: x / Protein: x / Nitrite: x   Leuk Esterase: x / RBC: x / WBC x   Sq Epi: x / Non Sq Epi: x / Bacteria: x      Culture - Urine (12.04.23 @ 22:26)   Specimen Source: Clean Catch Clean Catch (Midstream)  Culture Results: <10,000 CFU/mL Normal Urogenital Genet    Culture - Blood (12.04.23 @ 17:40)   Specimen Source: .Blood Blood-Peripheral  Culture Results: No growth at 24 hours    Culture - Blood (12.04.23 @ 15:57)   Specimen Source: .Blood Blood-Peripheral  Culture Results: No growth at 24 hours    < from: CT Chest No Cont (12.05.23 @ 20:02) >    ACC: [id number] EXAM:  CT CHEST   ORDERED BY: ANIA JENSEN     PROCEDURE DATE:  12/05/2023          INTERPRETATION:  HISTORY: Admitting Dxs: R06.02 FEVER    EXAMINATION: CT CHEST was performed without IV contrast.    COMPARISON: 10/12/2023.    FINDINGS:    AIRWAYS, LUNGS, PLEURA: Mild peribronchial thickening. No pleural   effusion.    Multifocal ground-glass opacities throughout all lung lobes are new   compared to 10/12/2023. Multiple additional subcentimeter nodular   opacities are unchanged; reference left apical 0.7 cm nodule (image 29,   series 3).    MEDIASTINUM: Normal heart size. No pericardial effusion. Dilated   ascending thoracic aorta measures 4.3 cm. Calcified mediastinal nodes.    IMAGED ABDOMEN: Pneumobilia and gas within the gallbladder, new compared   to 10/12/2023. Cholelithiasis.    SOFT TISSUES: Unremarkable.    BONES: No acute osseous abnormality.      IMPRESSION:.    Findings compatible with multifocal pneumonia, new compared to   10/12/2023. Recommend CT chest follow-up in 3 months to ensure clearing.    Indeterminate pulmonary nodules measuring up to 0.7 cm; recommend   attention on follow-up examination.    Pneumobilia and gas within the gallbladder, new compared to 10/12/2023;   infection should be considered as an etiology if there has been no   procedure performed (such as ERCP).    --- End of Report ---             BARAK SOTO MD; Attending Radiologist  This document has been electronically signed. Dec  6 2023  6:07AM    < end of copied text >

## 2023-12-06 NOTE — PROGRESS NOTE ADULT - ASSESSMENT
EKG SR no ischemic changes     Echo < from: TTE W or WO Ultrasound Enhancing Agent (10.13.23 @ 15:23) >   1. Left ventricular systolic function is mildly decreased with an ejection fraction of 47 % by Frances's method of disks. Regional wall motion abnormalities consistent with ischemic heart disease.   2. Normal right ventricular cavity size and systolic function. Tricuspid annular plane systolic excursion (TAPSE) is 1.9 cm (normal >=1.7 cm). Tricuspid annular tissue Doppler S' is 11.0 cm/s (normal >10 cm/s).   3. Mildmitral regurgitation.   4. Estimated pulmonary artery systolic pressure is 41 mmHg, consistent with mild pulmonary hypertension.   5. Mild to moderate tricuspid regurgitation.   6. Trace pulmonic regurgitation.   7. No pericardial effusion seen.    < end of copied text >      Assessment and Plan     1) SOB:  2/2 RSV inf.  with superimposed PNA on abx  improving   T/t per pulm     2) Mild LV dysfunction:  Euvolemic on exam   hold any diuresis     3) DVT PPX lovenox

## 2023-12-06 NOTE — PROGRESS NOTE ADULT - PROBLEM SELECTOR PLAN 1
RVP +RSV  -Per ED note, pt diffusely wheezing on admission. S/p solumedrol 125mg IVP x1.   -No wheezing at this time  -Continue Prednisone 40mg PO qd x 5 days  -Duoneb q6h started  -Keep sats >90% with o2 PRN (currently 2LNC). Wean O2 as tolerated  -CT chest with b/l opacities, viral vs bacterial. May consider covering with ABX given patient's hx RVP +RSV  -Per ED note, pt diffusely wheezing on admission. S/p solumedrol 125mg IVP x1.   -No wheezing at this time  -Continue Prednisone 40mg PO qd x 5 days  -Duoneb q6h started  -Keep sats >90% with o2 PRN (currently 2LNC). Wean O2 as tolerated  -CT chest with b/l opacities, viral vs bacterial. Favor covering with ABX given patient's history.

## 2023-12-07 LAB
ALBUMIN SERPL ELPH-MCNC: 3.3 G/DL — SIGNIFICANT CHANGE UP (ref 3.3–5)
ALBUMIN SERPL ELPH-MCNC: 3.3 G/DL — SIGNIFICANT CHANGE UP (ref 3.3–5)
ALP SERPL-CCNC: 58 U/L — SIGNIFICANT CHANGE UP (ref 40–120)
ALP SERPL-CCNC: 58 U/L — SIGNIFICANT CHANGE UP (ref 40–120)
ALT FLD-CCNC: 18 U/L — SIGNIFICANT CHANGE UP (ref 10–45)
ALT FLD-CCNC: 18 U/L — SIGNIFICANT CHANGE UP (ref 10–45)
ANION GAP SERPL CALC-SCNC: 16 MMOL/L — SIGNIFICANT CHANGE UP (ref 5–17)
ANION GAP SERPL CALC-SCNC: 16 MMOL/L — SIGNIFICANT CHANGE UP (ref 5–17)
AST SERPL-CCNC: 12 U/L — SIGNIFICANT CHANGE UP (ref 10–40)
AST SERPL-CCNC: 12 U/L — SIGNIFICANT CHANGE UP (ref 10–40)
BASOPHILS # BLD AUTO: 0.01 K/UL — SIGNIFICANT CHANGE UP (ref 0–0.2)
BASOPHILS # BLD AUTO: 0.01 K/UL — SIGNIFICANT CHANGE UP (ref 0–0.2)
BASOPHILS NFR BLD AUTO: 0.2 % — SIGNIFICANT CHANGE UP (ref 0–2)
BASOPHILS NFR BLD AUTO: 0.2 % — SIGNIFICANT CHANGE UP (ref 0–2)
BILIRUB SERPL-MCNC: 0.3 MG/DL — SIGNIFICANT CHANGE UP (ref 0.2–1.2)
BILIRUB SERPL-MCNC: 0.3 MG/DL — SIGNIFICANT CHANGE UP (ref 0.2–1.2)
BUN SERPL-MCNC: 24 MG/DL — HIGH (ref 7–23)
BUN SERPL-MCNC: 24 MG/DL — HIGH (ref 7–23)
CALCIUM SERPL-MCNC: 9 MG/DL — SIGNIFICANT CHANGE UP (ref 8.4–10.5)
CALCIUM SERPL-MCNC: 9 MG/DL — SIGNIFICANT CHANGE UP (ref 8.4–10.5)
CHLORIDE SERPL-SCNC: 105 MMOL/L — SIGNIFICANT CHANGE UP (ref 96–108)
CHLORIDE SERPL-SCNC: 105 MMOL/L — SIGNIFICANT CHANGE UP (ref 96–108)
CO2 SERPL-SCNC: 23 MMOL/L — SIGNIFICANT CHANGE UP (ref 22–31)
CO2 SERPL-SCNC: 23 MMOL/L — SIGNIFICANT CHANGE UP (ref 22–31)
CREAT SERPL-MCNC: 1.16 MG/DL — SIGNIFICANT CHANGE UP (ref 0.5–1.3)
CREAT SERPL-MCNC: 1.16 MG/DL — SIGNIFICANT CHANGE UP (ref 0.5–1.3)
DAT C3-SP REAG RBC QL: POSITIVE — SIGNIFICANT CHANGE UP
DAT C3-SP REAG RBC QL: POSITIVE — SIGNIFICANT CHANGE UP
EGFR: 69 ML/MIN/1.73M2 — SIGNIFICANT CHANGE UP
EGFR: 69 ML/MIN/1.73M2 — SIGNIFICANT CHANGE UP
EOSINOPHIL # BLD AUTO: 0.01 K/UL — SIGNIFICANT CHANGE UP (ref 0–0.5)
EOSINOPHIL # BLD AUTO: 0.01 K/UL — SIGNIFICANT CHANGE UP (ref 0–0.5)
EOSINOPHIL NFR BLD AUTO: 0.2 % — SIGNIFICANT CHANGE UP (ref 0–6)
EOSINOPHIL NFR BLD AUTO: 0.2 % — SIGNIFICANT CHANGE UP (ref 0–6)
FERRITIN SERPL-MCNC: 477 NG/ML — HIGH (ref 30–400)
FERRITIN SERPL-MCNC: 477 NG/ML — HIGH (ref 30–400)
FOLATE SERPL-MCNC: 4 NG/ML — LOW
FOLATE SERPL-MCNC: 4 NG/ML — LOW
GLUCOSE BLDC GLUCOMTR-MCNC: 143 MG/DL — HIGH (ref 70–99)
GLUCOSE BLDC GLUCOMTR-MCNC: 143 MG/DL — HIGH (ref 70–99)
GLUCOSE BLDC GLUCOMTR-MCNC: 148 MG/DL — HIGH (ref 70–99)
GLUCOSE BLDC GLUCOMTR-MCNC: 148 MG/DL — HIGH (ref 70–99)
GLUCOSE BLDC GLUCOMTR-MCNC: 165 MG/DL — HIGH (ref 70–99)
GLUCOSE BLDC GLUCOMTR-MCNC: 165 MG/DL — HIGH (ref 70–99)
GLUCOSE BLDC GLUCOMTR-MCNC: 93 MG/DL — SIGNIFICANT CHANGE UP (ref 70–99)
GLUCOSE BLDC GLUCOMTR-MCNC: 93 MG/DL — SIGNIFICANT CHANGE UP (ref 70–99)
GLUCOSE SERPL-MCNC: 86 MG/DL — SIGNIFICANT CHANGE UP (ref 70–99)
GLUCOSE SERPL-MCNC: 86 MG/DL — SIGNIFICANT CHANGE UP (ref 70–99)
HCT VFR BLD CALC: 29.2 % — LOW (ref 39–50)
HCT VFR BLD CALC: 29.2 % — LOW (ref 39–50)
HCT VFR BLD CALC: 29.3 % — LOW (ref 39–50)
HCT VFR BLD CALC: 29.3 % — LOW (ref 39–50)
HGB BLD-MCNC: 9.3 G/DL — LOW (ref 13–17)
IMM GRANULOCYTES NFR BLD AUTO: 0.8 % — SIGNIFICANT CHANGE UP (ref 0–0.9)
IMM GRANULOCYTES NFR BLD AUTO: 0.8 % — SIGNIFICANT CHANGE UP (ref 0–0.9)
IRON SATN MFR SERPL: 16 % — SIGNIFICANT CHANGE UP (ref 16–55)
IRON SATN MFR SERPL: 16 % — SIGNIFICANT CHANGE UP (ref 16–55)
IRON SATN MFR SERPL: 37 UG/DL — LOW (ref 45–165)
IRON SATN MFR SERPL: 37 UG/DL — LOW (ref 45–165)
LYMPHOCYTES # BLD AUTO: 2.47 K/UL — SIGNIFICANT CHANGE UP (ref 1–3.3)
LYMPHOCYTES # BLD AUTO: 2.47 K/UL — SIGNIFICANT CHANGE UP (ref 1–3.3)
LYMPHOCYTES # BLD AUTO: 37.9 % — SIGNIFICANT CHANGE UP (ref 13–44)
LYMPHOCYTES # BLD AUTO: 37.9 % — SIGNIFICANT CHANGE UP (ref 13–44)
MAGNESIUM SERPL-MCNC: 2.4 MG/DL — SIGNIFICANT CHANGE UP (ref 1.6–2.6)
MAGNESIUM SERPL-MCNC: 2.4 MG/DL — SIGNIFICANT CHANGE UP (ref 1.6–2.6)
MCHC RBC-ENTMCNC: 29.8 PG — SIGNIFICANT CHANGE UP (ref 27–34)
MCHC RBC-ENTMCNC: 31.7 GM/DL — LOW (ref 32–36)
MCHC RBC-ENTMCNC: 31.7 GM/DL — LOW (ref 32–36)
MCHC RBC-ENTMCNC: 31.8 GM/DL — LOW (ref 32–36)
MCHC RBC-ENTMCNC: 31.8 GM/DL — LOW (ref 32–36)
MCV RBC AUTO: 93.6 FL — SIGNIFICANT CHANGE UP (ref 80–100)
MCV RBC AUTO: 93.6 FL — SIGNIFICANT CHANGE UP (ref 80–100)
MCV RBC AUTO: 93.9 FL — SIGNIFICANT CHANGE UP (ref 80–100)
MCV RBC AUTO: 93.9 FL — SIGNIFICANT CHANGE UP (ref 80–100)
MONOCYTES # BLD AUTO: 0.79 K/UL — SIGNIFICANT CHANGE UP (ref 0–0.9)
MONOCYTES # BLD AUTO: 0.79 K/UL — SIGNIFICANT CHANGE UP (ref 0–0.9)
MONOCYTES NFR BLD AUTO: 12.1 % — SIGNIFICANT CHANGE UP (ref 2–14)
MONOCYTES NFR BLD AUTO: 12.1 % — SIGNIFICANT CHANGE UP (ref 2–14)
NEUTROPHILS # BLD AUTO: 3.18 K/UL — SIGNIFICANT CHANGE UP (ref 1.8–7.4)
NEUTROPHILS # BLD AUTO: 3.18 K/UL — SIGNIFICANT CHANGE UP (ref 1.8–7.4)
NEUTROPHILS NFR BLD AUTO: 48.8 % — SIGNIFICANT CHANGE UP (ref 43–77)
NEUTROPHILS NFR BLD AUTO: 48.8 % — SIGNIFICANT CHANGE UP (ref 43–77)
NRBC # BLD: 0 /100 WBCS — SIGNIFICANT CHANGE UP (ref 0–0)
PHOSPHATE SERPL-MCNC: 3.5 MG/DL — SIGNIFICANT CHANGE UP (ref 2.5–4.5)
PHOSPHATE SERPL-MCNC: 3.5 MG/DL — SIGNIFICANT CHANGE UP (ref 2.5–4.5)
PLATELET # BLD AUTO: 103 K/UL — LOW (ref 150–400)
PLATELET # BLD AUTO: 103 K/UL — LOW (ref 150–400)
PLATELET # BLD AUTO: 106 K/UL — LOW (ref 150–400)
PLATELET # BLD AUTO: 106 K/UL — LOW (ref 150–400)
POTASSIUM SERPL-MCNC: 4 MMOL/L — SIGNIFICANT CHANGE UP (ref 3.5–5.3)
POTASSIUM SERPL-MCNC: 4 MMOL/L — SIGNIFICANT CHANGE UP (ref 3.5–5.3)
POTASSIUM SERPL-SCNC: 4 MMOL/L — SIGNIFICANT CHANGE UP (ref 3.5–5.3)
POTASSIUM SERPL-SCNC: 4 MMOL/L — SIGNIFICANT CHANGE UP (ref 3.5–5.3)
PROCALCITONIN SERPL-MCNC: 0.19 NG/ML — HIGH (ref 0.02–0.1)
PROCALCITONIN SERPL-MCNC: 0.19 NG/ML — HIGH (ref 0.02–0.1)
PROCALCITONIN SERPL-MCNC: 0.21 NG/ML — HIGH (ref 0.02–0.1)
PROCALCITONIN SERPL-MCNC: 0.21 NG/ML — HIGH (ref 0.02–0.1)
PROT SERPL-MCNC: 6.3 G/DL — SIGNIFICANT CHANGE UP (ref 6–8.3)
PROT SERPL-MCNC: 6.3 G/DL — SIGNIFICANT CHANGE UP (ref 6–8.3)
RBC # BLD: 3.12 M/UL — LOW (ref 4.2–5.8)
RBC # FLD: 16.1 % — HIGH (ref 10.3–14.5)
RBC # FLD: 16.1 % — HIGH (ref 10.3–14.5)
RBC # FLD: 16.4 % — HIGH (ref 10.3–14.5)
RBC # FLD: 16.4 % — HIGH (ref 10.3–14.5)
SODIUM SERPL-SCNC: 144 MMOL/L — SIGNIFICANT CHANGE UP (ref 135–145)
SODIUM SERPL-SCNC: 144 MMOL/L — SIGNIFICANT CHANGE UP (ref 135–145)
TIBC SERPL-MCNC: 228 UG/DL — SIGNIFICANT CHANGE UP (ref 220–430)
TIBC SERPL-MCNC: 228 UG/DL — SIGNIFICANT CHANGE UP (ref 220–430)
UIBC SERPL-MCNC: 191 UG/DL — SIGNIFICANT CHANGE UP (ref 110–370)
UIBC SERPL-MCNC: 191 UG/DL — SIGNIFICANT CHANGE UP (ref 110–370)
VIT B12 SERPL-MCNC: 619 PG/ML — SIGNIFICANT CHANGE UP (ref 232–1245)
VIT B12 SERPL-MCNC: 619 PG/ML — SIGNIFICANT CHANGE UP (ref 232–1245)
WBC # BLD: 6.44 K/UL — SIGNIFICANT CHANGE UP (ref 3.8–10.5)
WBC # BLD: 6.44 K/UL — SIGNIFICANT CHANGE UP (ref 3.8–10.5)
WBC # BLD: 6.51 K/UL — SIGNIFICANT CHANGE UP (ref 3.8–10.5)
WBC # BLD: 6.51 K/UL — SIGNIFICANT CHANGE UP (ref 3.8–10.5)
WBC # FLD AUTO: 6.44 K/UL — SIGNIFICANT CHANGE UP (ref 3.8–10.5)
WBC # FLD AUTO: 6.44 K/UL — SIGNIFICANT CHANGE UP (ref 3.8–10.5)
WBC # FLD AUTO: 6.51 K/UL — SIGNIFICANT CHANGE UP (ref 3.8–10.5)
WBC # FLD AUTO: 6.51 K/UL — SIGNIFICANT CHANGE UP (ref 3.8–10.5)

## 2023-12-07 PROCEDURE — 99232 SBSQ HOSP IP/OBS MODERATE 35: CPT

## 2023-12-07 PROCEDURE — 93308 TTE F-UP OR LMTD: CPT | Mod: 26

## 2023-12-07 PROCEDURE — 93321 DOPPLER ECHO F-UP/LMTD STD: CPT | Mod: 26

## 2023-12-07 RX ADMIN — Medication 650 MILLIGRAM(S): at 22:00

## 2023-12-07 RX ADMIN — Medication 650 MILLIGRAM(S): at 21:31

## 2023-12-07 RX ADMIN — Medication 3 MILLILITER(S): at 17:44

## 2023-12-07 RX ADMIN — Medication 100 MILLIGRAM(S): at 13:15

## 2023-12-07 RX ADMIN — SENNA PLUS 2 TABLET(S): 8.6 TABLET ORAL at 21:31

## 2023-12-07 RX ADMIN — PANTOPRAZOLE SODIUM 40 MILLIGRAM(S): 20 TABLET, DELAYED RELEASE ORAL at 06:37

## 2023-12-07 RX ADMIN — Medication 200 MILLIGRAM(S): at 19:56

## 2023-12-07 RX ADMIN — Medication 50 MICROGRAM(S): at 06:37

## 2023-12-07 RX ADMIN — Medication 3 MILLILITER(S): at 01:04

## 2023-12-07 RX ADMIN — PIPERACILLIN AND TAZOBACTAM 25 GRAM(S): 4; .5 INJECTION, POWDER, LYOPHILIZED, FOR SOLUTION INTRAVENOUS at 13:15

## 2023-12-07 RX ADMIN — Medication 3 MILLILITER(S): at 06:37

## 2023-12-07 RX ADMIN — Medication 40 MILLIGRAM(S): at 06:37

## 2023-12-07 RX ADMIN — Medication 3 MILLILITER(S): at 11:43

## 2023-12-07 RX ADMIN — PIPERACILLIN AND TAZOBACTAM 25 GRAM(S): 4; .5 INJECTION, POWDER, LYOPHILIZED, FOR SOLUTION INTRAVENOUS at 06:43

## 2023-12-07 RX ADMIN — DAPAGLIFLOZIN 10 MILLIGRAM(S): 10 TABLET, FILM COATED ORAL at 06:42

## 2023-12-07 RX ADMIN — Medication 100 MILLIGRAM(S): at 21:31

## 2023-12-07 RX ADMIN — Medication 30 MILLILITER(S): at 21:31

## 2023-12-07 RX ADMIN — LORATADINE 10 MILLIGRAM(S): 10 TABLET ORAL at 11:43

## 2023-12-07 RX ADMIN — Medication 100 MILLIGRAM(S): at 06:37

## 2023-12-07 RX ADMIN — PIPERACILLIN AND TAZOBACTAM 25 GRAM(S): 4; .5 INJECTION, POWDER, LYOPHILIZED, FOR SOLUTION INTRAVENOUS at 21:29

## 2023-12-07 RX ADMIN — FAMOTIDINE 40 MILLIGRAM(S): 10 INJECTION INTRAVENOUS at 11:43

## 2023-12-07 RX ADMIN — Medication 3 MILLILITER(S): at 23:13

## 2023-12-07 RX ADMIN — ENOXAPARIN SODIUM 40 MILLIGRAM(S): 100 INJECTION SUBCUTANEOUS at 06:37

## 2023-12-07 RX ADMIN — Medication 1 TABLET(S): at 11:43

## 2023-12-07 RX ADMIN — PIPERACILLIN AND TAZOBACTAM 25 GRAM(S): 4; .5 INJECTION, POWDER, LYOPHILIZED, FOR SOLUTION INTRAVENOUS at 01:06

## 2023-12-07 NOTE — PROGRESS NOTE ADULT - SUBJECTIVE AND OBJECTIVE BOX
Follow-up Pulm Progress Note    Cough improving  O2 sats 98% on 2LNC this AM, placed on room air  Denies CP, SOB     Medications:  MEDICATIONS  (STANDING):  albuterol    90 MICROgram(s) HFA Inhaler 2 Puff(s) Inhalation every 6 hours  albuterol/ipratropium for Nebulization 3 milliLiter(s) Nebulizer every 6 hours  benzonatate 100 milliGRAM(s) Oral every 8 hours  dapagliflozin 10 milliGRAM(s) Oral every 24 hours  dextrose 5%. 1000 milliLiter(s) (50 mL/Hr) IV Continuous <Continuous>  dextrose 5%. 1000 milliLiter(s) (100 mL/Hr) IV Continuous <Continuous>  dextrose 50% Injectable 25 Gram(s) IV Push once  dextrose 50% Injectable 25 Gram(s) IV Push once  dextrose 50% Injectable 12.5 Gram(s) IV Push once  enoxaparin Injectable 40 milliGRAM(s) SubCutaneous every 24 hours  famotidine    Tablet 40 milliGRAM(s) Oral daily  glucagon  Injectable 1 milliGRAM(s) IntraMuscular once  insulin lispro (ADMELOG) corrective regimen sliding scale   SubCutaneous three times a day before meals  insulin lispro (ADMELOG) corrective regimen sliding scale   SubCutaneous at bedtime  levothyroxine 50 MICROGram(s) Oral daily  loratadine 10 milliGRAM(s) Oral daily  multivitamin 1 Tablet(s) Oral daily  pantoprazole    Tablet 40 milliGRAM(s) Oral before breakfast  piperacillin/tazobactam IVPB.. 3.375 Gram(s) IV Intermittent every 8 hours  predniSONE   Tablet 40 milliGRAM(s) Oral daily  senna 2 Tablet(s) Oral at bedtime    MEDICATIONS  (PRN):  acetaminophen     Tablet .. 650 milliGRAM(s) Oral every 6 hours PRN Temp greater or equal to 38C (100.4F), Mild Pain (1 - 3)  dextrose Oral Gel 15 Gram(s) Oral once PRN Blood Glucose LESS THAN 70 milliGRAM(s)/deciliter  guaiFENesin Oral Liquid (Sugar-Free) 200 milliGRAM(s) Oral every 6 hours PRN Cough          Vital Signs Last 24 Hrs  T(C): 37 (07 Dec 2023 12:09), Max: 37 (07 Dec 2023 12:09)  T(F): 98.6 (07 Dec 2023 12:09), Max: 98.6 (07 Dec 2023 12:09)  HR: 88 (07 Dec 2023 12:09) (88 - 98)  BP: 114/78 (07 Dec 2023 12:09) (111/71 - 127/85)  BP(mean): --  RR: 18 (07 Dec 2023 12:09) (18 - 18)  SpO2: 97% (07 Dec 2023 12:09) (94% - 97%)    Parameters below as of 07 Dec 2023 12:09  Patient On (Oxygen Delivery Method): room air              12-06 @ 07:01  -  12-07 @ 07:00  --------------------------------------------------------  IN: 240 mL / OUT: 2800 mL / NET: -2560 mL          LABS:                        9.3    6.51  )-----------( 106      ( 07 Dec 2023 07:15 )             29.2     12-07    144  |  105  |  24<H>  ----------------------------<  86  4.0   |  23  |  1.16    Ca    9.0      07 Dec 2023 07:15  Phos  3.5     12-07  Mg     2.4     12-07    TPro  6.3  /  Alb  3.3  /  TBili  0.3  /  DBili  x   /  AST  12  /  ALT  18  /  AlkPhos  58  12-07          CAPILLARY BLOOD GLUCOSE      POCT Blood Glucose.: 143 mg/dL (07 Dec 2023 12:32)      Urinalysis Basic - ( 07 Dec 2023 07:15 )    Color: x / Appearance: x / SG: x / pH: x  Gluc: 86 mg/dL / Ketone: x  / Bili: x / Urobili: x   Blood: x / Protein: x / Nitrite: x   Leuk Esterase: x / RBC: x / WBC x   Sq Epi: x / Non Sq Epi: x / Bacteria: x      Procalcitonin, Serum: 0.21 ng/mL (12-07-23 @ 07:15)  Procalcitonin, Serum: 0.19 ng/mL (12-07-23 @ 07:15)                  CULTURES: (if applicable)    Culture - Blood (collected 12-04-23 @ 17:40)  Source: .Blood Blood-Peripheral  Preliminary Report (12-06-23 @ 22:02):    No growth at 48 Hours    Culture - Blood (collected 12-04-23 @ 15:57)  Source: .Blood Blood-Peripheral  Preliminary Report (12-06-23 @ 22:02):    No growth at 48 Hours        Culture - Urine (collected 12-04-23 @ 22:26)  Source: Clean Catch Clean Catch (Midstream)  Final Report (12-06-23 @ 07:39):    <10,000 CFU/mL Normal Urogenital Genet      Physical Examination:  PULM: few scattered rhonchi, no wheeze  CVS: S1, S2 heard    RADIOLOGY REVIEWED  CT chest: < from: CT Chest No Cont (12.05.23 @ 20:02) >  FINDINGS:    AIRWAYS, LUNGS, PLEURA: Mild peribronchial thickening. No pleural   effusion.    Multifocal ground-glass opacities throughout all lung lobes are new   compared to 10/12/2023. Multiple additional subcentimeter nodular   opacities are unchanged; reference left apical 0.7 cm nodule (image 29,   series 3).    MEDIASTINUM: Normal heart size. No pericardial effusion. Dilated   ascending thoracic aorta measures 4.3 cm. Calcified mediastinal nodes.    IMAGED ABDOMEN: Pneumobilia and gas within the gallbladder, new compared   to 10/12/2023. Cholelithiasis.    SOFT TISSUES: Unremarkable.    BONES: No acute osseous abnormality.      IMPRESSION:.    Findings compatible with multifocal pneumonia, new compared to   10/12/2023. Recommend CT chest follow-up in 3 months to ensure clearing.    Indeterminate pulmonary nodules measuring up to 0.7 cm; recommend   attention on follow-up examination.    Pneumobilia and gas within the gallbladder, new compared to 10/12/2023;   infection should be considered as an etiology if there has been no   procedure performed (such as ERCP).    --- End of Report ---        < end of copied text >

## 2023-12-07 NOTE — PROGRESS NOTE ADULT - PROBLEM SELECTOR PLAN 2
-CT chest with b/l opacities, viral vs bacterial.   -RVP + RSV  -Favor covering with ABX given patient's history.   -Procalcitonin slightly elevated  -ABX per ID.

## 2023-12-07 NOTE — PROGRESS NOTE ADULT - ASSESSMENT
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), developed cough about a week ago, was given azithro which he took for 3 days with no improvement and became febrile so came to the hospital, he has had VELAZCO for 1-2 months now worse, cough is also productive   here pt afebrile, tachypneic requiring O2, no WBC  RSV positive  chest CT: multifocal pneumonia, also new pneumobilia and gas within gall bladder  blood and urine cx negative    immunocompromised pt with MM on chemo with RSV pneumonia and asthma exacerbation, no WBC and the opacities are more patchy s/o viral pneumonia and procal is 0.2  new pneumobilia and gas in gallbladder is concerning (s/p methylprednisolone 125 and now on prednisone 40 so could be asymptomatic), LFTs and exam unremarkable though  * supportive care for RSV  * abd CT showed decreased pneumobilia and gallbladder would still get surgery eval  * I don't think pt has a bacterial pneumonia but c/w zosyn for the gallbladder gas for now, started 12/6, now day 2  * monitor CBC/diff and CMP    The above assessment and plan was discussed with the primary team    Santa Candelario MD  contact on teams  After 5pm and on weekends call [phone]       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), developed cough about a week ago, was given azithro which he took for 3 days with no improvement and became febrile so came to the hospital, he has had VELAZCO for 1-2 months now worse, cough is also productive   here pt afebrile, tachypneic requiring O2, no WBC  RSV positive  chest CT: multifocal pneumonia, also new pneumobilia and gas within gall bladder  blood and urine cx negative    immunocompromised pt with MM on chemo with RSV pneumonia and asthma exacerbation, no WBC and the opacities are more patchy s/o viral pneumonia and procal is 0.2  new pneumobilia and gas in gallbladder is concerning (s/p methylprednisolone 125 and now on prednisone 40 so could be asymptomatic), LFTs and exam unremarkable though  * supportive care for RSV  * abd CT showed decreased pneumobilia and gallbladder would still get surgery eval  * I don't think pt has a bacterial pneumonia but c/w zosyn for the gallbladder gas for now, started 12/6, now day 2  * monitor CBC/diff and CMP    The above assessment and plan was discussed with the primary team    Santa Candelario MD  contact on teams  After 5pm and on weekends call [phone]

## 2023-12-07 NOTE — PROGRESS NOTE ADULT - SUBJECTIVE AND OBJECTIVE BOX
Name of Patient : CARMEN BERNSTEIN  MRN: [id number]        Subjective: Patient seen and examined. No new events except as noted.     REVIEW OF SYSTEMS:    CONSTITUTIONAL: No weakness, fevers or chills  EYES/ENT: No visual changes;  No vertigo or throat pain   NECK: No pain or stiffness  RESPIRATORY: No cough, wheezing, hemoptysis; No shortness of breath  CARDIOVASCULAR: No chest pain or palpitations  GASTROINTESTINAL: No abdominal or epigastric pain. No nausea, vomiting, or hematemesis; No diarrhea or constipation. No melena or hematochezia.  GENITOURINARY: No dysuria, frequency or hematuria  NEUROLOGICAL: No numbness or weakness  SKIN: No itching, burning, rashes, or lesions   All other review of systems is negative unless indicated above.    MEDICATIONS:  MEDICATIONS  (STANDING):  albuterol    90 MICROgram(s) HFA Inhaler 2 Puff(s) Inhalation every 6 hours  albuterol/ipratropium for Nebulization 3 milliLiter(s) Nebulizer every 6 hours  benzonatate 100 milliGRAM(s) Oral every 8 hours  dapagliflozin 10 milliGRAM(s) Oral every 24 hours  dextrose 5%. 1000 milliLiter(s) (50 mL/Hr) IV Continuous <Continuous>  dextrose 5%. 1000 milliLiter(s) (100 mL/Hr) IV Continuous <Continuous>  dextrose 50% Injectable 25 Gram(s) IV Push once  dextrose 50% Injectable 25 Gram(s) IV Push once  dextrose 50% Injectable 12.5 Gram(s) IV Push once  enoxaparin Injectable 40 milliGRAM(s) SubCutaneous every 24 hours  famotidine    Tablet 40 milliGRAM(s) Oral daily  glucagon  Injectable 1 milliGRAM(s) IntraMuscular once  insulin lispro (ADMELOG) corrective regimen sliding scale   SubCutaneous three times a day before meals  insulin lispro (ADMELOG) corrective regimen sliding scale   SubCutaneous at bedtime  levothyroxine 50 MICROGram(s) Oral daily  loratadine 10 milliGRAM(s) Oral daily  multivitamin 1 Tablet(s) Oral daily  pantoprazole    Tablet 40 milliGRAM(s) Oral before breakfast  piperacillin/tazobactam IVPB.. 3.375 Gram(s) IV Intermittent every 8 hours  predniSONE   Tablet 40 milliGRAM(s) Oral daily  senna 2 Tablet(s) Oral at bedtime      PHYSICAL EXAM:  T(C): 36.6 (12-07-23 @ 20:42), Max: 37 (12-07-23 @ 12:09)  HR: 98 (12-07-23 @ 20:42) (88 - 98)  BP: 116/83 (12-07-23 @ 20:42) (111/71 - 116/83)  RR: 18 (12-07-23 @ 20:42) (18 - 18)  SpO2: 93% (12-07-23 @ 20:42) (93% - 97%)  Wt(kg): --  I&O's Summary    06 Dec 2023 07:01  -  07 Dec 2023 07:00  --------------------------------------------------------  IN: 240 mL / OUT: 2800 mL / NET: -2560 mL    07 Dec 2023 07:01  -  08 Dec 2023 02:01  --------------------------------------------------------  IN: 100 mL / OUT: 350 mL / NET: -250 mL          Appearance: Normal	  HEENT:  PERRLA   Lymphatic: No lymphadenopathy   Cardiovascular: Normal S1 S2, no JVD  Respiratory: normal effort , clear  Gastrointestinal:  Soft, Non-tender  Skin: No rashes,  warm to touch  Psychiatry:  Mood & affect appropriate  Musculuskeletal: No edema    recent labs, Imaging and EKGs personally reviewed             12-06-23 @ 07:01  -  12-07-23 @ 07:00  --------------------------------------------------------  IN: 240 mL / OUT: 2800 mL / NET: -2560 mL    12-07-23 @ 07:01  -  12-08-23 @ 02:01  --------------------------------------------------------  IN: 100 mL / OUT: 350 mL / NET: -250 mL             Name of Patient : CARMEN BERNSTEIN  MRN: [id number]        Subjective: Patient seen and examined. No new events except as noted.     REVIEW OF SYSTEMS:    CONSTITUTIONAL: No weakness, fevers or chills  EYES/ENT: No visual changes;  No vertigo or throat pain   NECK: No pain or stiffness  RESPIRATORY: No cough, wheezing, hemoptysis; No shortness of breath  CARDIOVASCULAR: No chest pain or palpitations  GASTROINTESTINAL: No abdominal or epigastric pain. No nausea, vomiting, or hematemesis; No diarrhea or constipation. No melena or hematochezia.  GENITOURINARY: No dysuria, frequency or hematuria  NEUROLOGICAL: No numbness or weakness  SKIN: No itching, burning, rashes, or lesions   All other review of systems is negative unless indicated above.    MEDICATIONS:  MEDICATIONS  (STANDING):  albuterol    90 MICROgram(s) HFA Inhaler 2 Puff(s) Inhalation every 6 hours  albuterol/ipratropium for Nebulization 3 milliLiter(s) Nebulizer every 6 hours  benzonatate 100 milliGRAM(s) Oral every 8 hours  dapagliflozin 10 milliGRAM(s) Oral every 24 hours  dextrose 5%. 1000 milliLiter(s) (50 mL/Hr) IV Continuous <Continuous>  dextrose 5%. 1000 milliLiter(s) (100 mL/Hr) IV Continuous <Continuous>  dextrose 50% Injectable 25 Gram(s) IV Push once  dextrose 50% Injectable 25 Gram(s) IV Push once  dextrose 50% Injectable 12.5 Gram(s) IV Push once  enoxaparin Injectable 40 milliGRAM(s) SubCutaneous every 24 hours  famotidine    Tablet 40 milliGRAM(s) Oral daily  glucagon  Injectable 1 milliGRAM(s) IntraMuscular once  insulin lispro (ADMELOG) corrective regimen sliding scale   SubCutaneous three times a day before meals  insulin lispro (ADMELOG) corrective regimen sliding scale   SubCutaneous at bedtime  levothyroxine 50 MICROGram(s) Oral daily  loratadine 10 milliGRAM(s) Oral daily  multivitamin 1 Tablet(s) Oral daily  pantoprazole    Tablet 40 milliGRAM(s) Oral before breakfast  piperacillin/tazobactam IVPB.. 3.375 Gram(s) IV Intermittent every 8 hours  predniSONE   Tablet 40 milliGRAM(s) Oral daily  senna 2 Tablet(s) Oral at bedtime      PHYSICAL EXAM:  T(C): 36.6 (12-07-23 @ 20:42), Max: 37 (12-07-23 @ 12:09)  HR: 98 (12-07-23 @ 20:42) (88 - 98)  BP: 116/83 (12-07-23 @ 20:42) (111/71 - 116/83)  RR: 18 (12-07-23 @ 20:42) (18 - 18)  SpO2: 93% (12-07-23 @ 20:42) (93% - 97%)  Wt(kg): --  I&O's Summary    06 Dec 2023 07:01  -  07 Dec 2023 07:00  --------------------------------------------------------  IN: 240 mL / OUT: 2800 mL / NET: -2560 mL    07 Dec 2023 07:01  -  08 Dec 2023 02:01  --------------------------------------------------------  IN: 100 mL / OUT: 350 mL / NET: -250 mL          Appearance: Normal	  HEENT:  PERRLA   Lymphatic: No lymphadenopathy   Cardiovascular: Normal S1 S2, no JVD  Respiratory: normal effort , clear  Gastrointestinal:  Soft, Non-tender  Skin: No rashes,  warm to touch  Psychiatry:  Mood & affect appropriate  Musculuskeletal: No edema    recent labs, Imaging and EKGs personally reviewed             12-06-23 @ 07:01  -  12-07-23 @ 07:00  --------------------------------------------------------  IN: 240 mL / OUT: 2800 mL / NET: -2560 mL    12-07-23 @ 07:01  -  12-08-23 @ 02:01  --------------------------------------------------------  IN: 100 mL / OUT: 350 mL / NET: -250 mL

## 2023-12-07 NOTE — PROGRESS NOTE ADULT - ASSESSMENT
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 Concern for AHRF, likely in setting of asthma exacerbation 2/2 RSV infection.      Acute respiratory failure with hypoxia.   - Ambulatory O2 sat dropped to 80s on ambulation in ER. Likely in setting of Asthma Exacerbation 2/2 RSV infection.  - CXR w/o focal consolidation   - CT Chest with PNA -GGO in B/L Lung fields, multiple nodular opacities, Dilated ascending aorta 4.3 CM, Pneumobilia and gas within the GB   - S/P Solumedrol 125mg IVP X 1 in ER for reported wheezing, transitioned to PO Prednisone 40 PO qd X 5 days w/ PPI for GI PPX   - C/w Duoneb q6h   - Incentive Spirometer   - Check Procal in AM   - Check Sputum culture   - Management of Asthma exacerbation as below   - Monitor respiratory status, wean supplemental O2 as tolerated (patient is not on O2 at baseline)  - Pulm evaluation appreciated; F/u recs      Acute asthma exacerbation.   - Hx of asthma, not on inhalers at home. Presented with worsening SOB/VELAZCO --> Exacerbated likely due to RSV    - S/P solumedrol 125mg IVP in ER, C/w prednisone 40mg PO x5 days  - C/w duonebs q6h   - Keep sats >90% with O2 PRN (currently 2LNC). Wean O2 as tolerated    RSV infection.   - Found to be RSV positive (12/4/23)  - C/w symptomatic management as above   - Monitor respiratory status  - Maintain isolation precautions    Pneumonia   - CT Chest as above with B/L GGO Opacities.  - Check Procal in AM   - ABX as per ID   - ID and pulm evaluations consulted and appreciated; F/u recs     NICM (nonischemic cardiomyopathy).   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 On admission, proBNP WNL and clinically euvolemic appearing.   - Not in active CHF exacerbation  - was started on ASA/statin last admission for "non-obstructive CAD" but [facility] (10/15/23) reported no disease; pt stated not taking theses medication at home, will continue to hold for now  - At home, not on previously prescribed valsartan and metoprolol/carvedilol due to drops in BP per family  - Resume antihypertensives for GDMT if able to tolerate pending repeat TTE  - Check Repeat TTE   - C/w home Farxiga.    Anemia  - Monitor H/H closely  - Check Iron, B12, Folate, Ferritin, TIBC in AM   - Maintain Active T/S   - Transfuse for Hgb < 8.0 in view of NICM    GB Pneumobilia  - CT Chest with Pneumobilia/ Gas within GB  - Serial Abd examinations  - Check CT A/P w/ IV contrast   - Start Zosyn as per ID   - Pt asymptomatic LFTs WNL. Continue to monitor and trend    Multiple myeloma.   - Hx of MM (on weekly chemo/immunotherapy).   - Heme/Onc eval appreciated; F/u recs     Lung nodule   - CT Chest with multiple subcentimeter lung nodules  - Pt will require repeat CT chest in 3 months to re-assess. Discussed with Daughter and patient 12/06     Dilated Ascending Thoracic Aorta  - BP control  - Repeat CT in 3-6 months as an outpatient    Hypothyroidism.   - C/w home levothyroxine.    Prophylactic measure.   - DVT ppx: Lovenox  - Diet: Regular, low sodium  - Dispo: pending further improvement.     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 Concern for AHRF, likely in setting of asthma exacerbation 2/2 RSV infection.      Acute respiratory failure with hypoxia.   - Ambulatory O2 sat dropped to 80s on ambulation in ER. Likely in setting of Asthma Exacerbation 2/2 RSV infection.  - CXR w/o focal consolidation   - CT Chest with PNA -GGO in B/L Lung fields, multiple nodular opacities, Dilated ascending aorta 4.3 CM, Pneumobilia and gas within the GB   - S/P Solumedrol 125mg IVP X 1 in ER for reported wheezing, transitioned to PO Prednisone 40 PO qd X 5 days w/ PPI for GI PPX   - C/w Duoneb q6h   - Incentive Spirometer   - Check Procal in AM   - Check Sputum culture   - Management of Asthma exacerbation as below   - Monitor respiratory status, wean supplemental O2 as tolerated (patient is not on O2 at baseline)  - Pulm evaluation appreciated; F/u recs      Acute asthma exacerbation.   - Hx of asthma, not on inhalers at home. Presented with worsening SOB/VELAZCO --> Exacerbated likely due to RSV    - S/P solumedrol 125mg IVP in ER, C/w prednisone 40mg PO x5 days  - C/w duonebs q6h   - Keep sats >90% with O2 PRN (currently 2LNC). Wean O2 as tolerated    RSV infection.   - Found to be RSV positive (12/4/23)  - C/w symptomatic management as above   - Monitor respiratory status  - Maintain isolation precautions    Pneumonia   - CT Chest as above with B/L GGO Opacities.  - Check Procal in AM   - ABX as per ID   - ID and pulm evaluations consulted and appreciated; F/u recs     NICM (nonischemic cardiomyopathy).   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 On admission, proBNP WNL and clinically euvolemic appearing.   - Not in active CHF exacerbation  - was started on ASA/statin last admission for "non-obstructive CAD" but [facility] (10/15/23) reported no disease; pt stated not taking theses medication at home, will continue to hold for now  - At home, not on previously prescribed valsartan and metoprolol/carvedilol due to drops in BP per family  - Resume antihypertensives for GDMT if able to tolerate pending repeat TTE  - Check Repeat TTE   - C/w home Farxiga.    Anemia  - Monitor H/H closely  - Check Iron, B12, Folate, Ferritin, TIBC in AM   - Maintain Active T/S   - Transfuse for Hgb < 8.0 in view of NICM    GB Pneumobilia  - CT Chest with Pneumobilia/ Gas within GB  - Serial Abd examinations  - Check CT A/P w/ IV contrast   - Start Zosyn as per ID   - Pt asymptomatic LFTs WNL. Continue to monitor and trend    Multiple myeloma.   - Hx of MM (on weekly chemo/immunotherapy).   - Heme/Onc eval appreciated; F/u recs     Lung nodule   - CT Chest with multiple subcentimeter lung nodules  - Pt will require repeat CT chest in 3 months to re-assess. Discussed with Daughter and patient 12/06     Dilated Ascending Thoracic Aorta  - BP control  - Repeat CT in 3-6 months as an outpatient    Hypothyroidism.   - C/w home levothyroxine.    Prophylactic measure.   - DVT ppx: Lovenox  - Diet: Regular, low sodium  - Dispo: pending further improvement.

## 2023-12-07 NOTE — PROGRESS NOTE ADULT - SUBJECTIVE AND OBJECTIVE BOX
Follow Up:  fever, RSV, pneumobilia and gallbladder gas    Interval History/ROS: pt afebrile, no SOB now on RA, no vomiting          Allergies  sulfa drugs (Unknown)        ANTIMICROBIALS:  piperacillin/tazobactam IVPB.. 3.375 every 8 hours      OTHER MEDS:  acetaminophen     Tablet .. 650 milliGRAM(s) Oral every 6 hours PRN  albuterol    90 MICROgram(s) HFA Inhaler 2 Puff(s) Inhalation every 6 hours  albuterol/ipratropium for Nebulization 3 milliLiter(s) Nebulizer every 6 hours  benzonatate 100 milliGRAM(s) Oral every 8 hours  dapagliflozin 10 milliGRAM(s) Oral every 24 hours  dextrose 5%. 1000 milliLiter(s) IV Continuous <Continuous>  dextrose 5%. 1000 milliLiter(s) IV Continuous <Continuous>  dextrose 50% Injectable 25 Gram(s) IV Push once  dextrose 50% Injectable 25 Gram(s) IV Push once  dextrose 50% Injectable 12.5 Gram(s) IV Push once  dextrose Oral Gel 15 Gram(s) Oral once PRN  enoxaparin Injectable 40 milliGRAM(s) SubCutaneous every 24 hours  famotidine    Tablet 40 milliGRAM(s) Oral daily  glucagon  Injectable 1 milliGRAM(s) IntraMuscular once  guaiFENesin Oral Liquid (Sugar-Free) 200 milliGRAM(s) Oral every 6 hours PRN  insulin lispro (ADMELOG) corrective regimen sliding scale   SubCutaneous at bedtime  insulin lispro (ADMELOG) corrective regimen sliding scale   SubCutaneous three times a day before meals  levothyroxine 50 MICROGram(s) Oral daily  loratadine 10 milliGRAM(s) Oral daily  multivitamin 1 Tablet(s) Oral daily  pantoprazole    Tablet 40 milliGRAM(s) Oral before breakfast  predniSONE   Tablet 40 milliGRAM(s) Oral daily  senna 2 Tablet(s) Oral at bedtime      Vital Signs Last 24 Hrs  T(C): 37 (07 Dec 2023 12:09), Max: 37 (07 Dec 2023 12:09)  T(F): 98.6 (07 Dec 2023 12:09), Max: 98.6 (07 Dec 2023 12:09)  HR: 88 (07 Dec 2023 12:09) (88 - 98)  BP: 114/78 (07 Dec 2023 12:09) (111/71 - 127/85)  BP(mean): --  RR: 18 (07 Dec 2023 12:09) (18 - 18)  SpO2: 97% (07 Dec 2023 12:09) (95% - 97%)    Parameters below as of 07 Dec 2023 12:09  Patient On (Oxygen Delivery Method): room air        Physical Exam:  General:    NAD, non toxic  Respiratory:  comfortable on RA  abd:   soft,  not tender  :     no CVAT, no suprapubic tenderness, no valdovinos  Musculoskeletal : no joint swelling, no edema  Skin:    no rash  vascular: no phlebitis             9.3    6.51  )-----------( 106      ( 07 Dec 2023 07:15 )             29.2       12-07    144  |  105  |  24<H>  ----------------------------<  86  4.0   |  23  |  1.16    Ca    9.0      07 Dec 2023 07:15  Phos  3.5     12-07  Mg     2.4     12-07    TPro  6.3  /  Alb  3.3  /  TBili  0.3  /  DBili  x   /  AST  12  /  ALT  18  /  AlkPhos  58  12-07      Urinalysis Basic - ( 07 Dec 2023 07:15 )    Color: x / Appearance: x / SG: x / pH: x  Gluc: 86 mg/dL / Ketone: x  / Bili: x / Urobili: x   Blood: x / Protein: x / Nitrite: x   Leuk Esterase: x / RBC: x / WBC x   Sq Epi: x / Non Sq Epi: x / Bacteria: x        MICROBIOLOGY:  v  Clean Catch Clean Catch (Midstream)  12-04-23   <10,000 CFU/mL Normal Urogenital Genet  --  --      .Blood Blood-Peripheral  12-04-23   No growth at 48 Hours  --  --      .Blood Blood-Peripheral  12-04-23   No growth at 48 Hours  --  --                RADIOLOGY:  Images independently visualized and reviewed personally, findings as below  < from: CT Abdomen and Pelvis w/ IV Cont (12.06.23 @ 20:34) >  IMPRESSION:  Decreased central pneumobilia and air in the gallbladder, which may be   related to prior sphincterotomy. Correlate for recent instrumentation.  An indeterminate tiny lesion arising from the lower pole of left kidney   for which further characterization with MRI is advised on outpatient   basis.    < end of copied text >  < from: CT Chest No Cont (12.05.23 @ 20:02) >  IMPRESSION:.    Findings compatible with multifocal pneumonia, new compared to   10/12/2023. Recommend CT chest follow-up in 3 months to ensure clearing.    Indeterminate pulmonary nodules measuring up to 0.7 cm; recommend   attention on follow-up examination.    Pneumobilia and gas within the gallbladder, new compared to 10/12/2023;   infection should be considered as an etiology if there has been no   procedure performed (such as ERCP).    < end of copied text >

## 2023-12-07 NOTE — PROGRESS NOTE ADULT - SUBJECTIVE AND OBJECTIVE BOX
Ino Davison MD  Interventional Cardiology / Endovascular Specialist  [place] Office : [phone] [place] [address] [place] [address]  Tel:   [place] Office : [address] [facility] [place] [address]  Tel: [phone]    Pt lying in bed in NAD denies   	  MEDICATIONS:  enoxaparin Injectable 40 milliGRAM(s) SubCutaneous every 24 hours    piperacillin/tazobactam IVPB.. 3.375 Gram(s) IV Intermittent every 8 hours    albuterol    90 MICROgram(s) HFA Inhaler 2 Puff(s) Inhalation every 6 hours  albuterol/ipratropium for Nebulization 3 milliLiter(s) Nebulizer every 6 hours  benzonatate 100 milliGRAM(s) Oral every 8 hours  guaiFENesin Oral Liquid (Sugar-Free) 200 milliGRAM(s) Oral every 6 hours PRN  loratadine 10 milliGRAM(s) Oral daily    acetaminophen     Tablet .. 650 milliGRAM(s) Oral every 6 hours PRN    aluminum hydroxide/magnesium hydroxide/simethicone Suspension 30 milliLiter(s) Oral every 4 hours PRN  famotidine    Tablet 40 milliGRAM(s) Oral daily  pantoprazole    Tablet 40 milliGRAM(s) Oral before breakfast  senna 2 Tablet(s) Oral at bedtime    dapagliflozin 10 milliGRAM(s) Oral every 24 hours  dextrose 50% Injectable 25 Gram(s) IV Push once  dextrose 50% Injectable 25 Gram(s) IV Push once  dextrose 50% Injectable 12.5 Gram(s) IV Push once  dextrose Oral Gel 15 Gram(s) Oral once PRN  glucagon  Injectable 1 milliGRAM(s) IntraMuscular once  insulin lispro (ADMELOG) corrective regimen sliding scale   SubCutaneous at bedtime  insulin lispro (ADMELOG) corrective regimen sliding scale   SubCutaneous three times a day before meals  levothyroxine 50 MICROGram(s) Oral daily  predniSONE   Tablet 40 milliGRAM(s) Oral daily    dextrose 5%. 1000 milliLiter(s) IV Continuous <Continuous>  dextrose 5%. 1000 milliLiter(s) IV Continuous <Continuous>  multivitamin 1 Tablet(s) Oral daily      PAST MEDICAL/SURGICAL HISTORY  PAST MEDICAL & SURGICAL HISTORY:  Hypothyroidism      Multiple myeloma      Asthma      No significant past surgical history          SOCIAL HISTORY: Substance Use (street drugs): ( x ) never used  (  ) other:    FAMILY HISTORY:  FHx: leukemia        REVIEW OF SYSTEMS:  CONSTITUTIONAL: No fever, weight loss, or fatigue  EYES: No eye pain, visual disturbances, or discharge  ENMT:  No difficulty hearing, tinnitus, vertigo; No sinus or throat pain  BREASTS: No pain, masses, or nipple discharge  GASTROINTESTINAL: No abdominal or epigastric pain. No nausea, vomiting, or hematemesis; No diarrhea or constipation. No melena or hematochezia.  GENITOURINARY: No dysuria, frequency, hematuria, or incontinence  NEUROLOGICAL: No headaches, memory loss, loss of strength, numbness, or tremors  ENDOCRINE: No heat or cold intolerance; No hair loss  MUSCULOSKELETAL: No joint pain or swelling; No muscle, back, or extremity pain  PSYCHIATRIC: No depression, anxiety, mood swings, or difficulty sleeping  HEME/LYMPH: No easy bruising, or bleeding gums  All others negative    PHYSICAL EXAM:  T(C): 36.6 (12-07-23 @ 20:42), Max: 37 (12-07-23 @ 12:09)  HR: 98 (12-07-23 @ 20:42) (88 - 98)  BP: 116/83 (12-07-23 @ 20:42) (111/71 - 116/83)  RR: 18 (12-07-23 @ 20:42) (18 - 18)  SpO2: 93% (12-07-23 @ 20:42) (93% - 97%)  Wt(kg): --  I&O's Summary    06 Dec 2023 07:01  -  07 Dec 2023 07:00  --------------------------------------------------------  IN: 240 mL / OUT: 2800 mL / NET: -2560 mL    07 Dec 2023 07:01  -  07 Dec 2023 23:17  --------------------------------------------------------  IN: 100 mL / OUT: 350 mL / NET: -250 mL    GENERAL: NAD febrile   EYES: conjunctiva and sclera clear  ENMT: No tonsillar erythema, exudates, or enlargement   Cardiovascular: Normal S1 S2, No JVD, No murmurs  Respiratory: B/L basal ronchi   Gastrointestinal:  Soft, Non-tender, + BS	  Extremities: No edema                            9.3    6.51  )-----------( 106      ( 07 Dec 2023 07:15 )             29.2     12-07    144  |  105  |  24<H>  ----------------------------<  86  4.0   |  23  |  1.16    Ca    9.0      07 Dec 2023 07:15  Phos  3.5     12-07  Mg     2.4     12-07    TPro  6.3  /  Alb  3.3  /  TBili  0.3  /  DBili  x   /  AST  12  /  ALT  18  /  AlkPhos  58  12-07    proBNP:   Lipid Profile:   HgA1c:   TSH:     Consultant(s) Notes Reviewed:  [x ] YES  [ ] NO    Care Discussed with Consultants/Other Providers [ x] YES  [ ] NO    Imaging Personally Reviewed independently:  [x] YES  [ ] NO    All labs, radiologic studies, vitals, orders and medications list reviewed. Patient is seen and examined at bedside. Case discussed with medical team.                 Ino Davison MD  Interventional Cardiology / Endovascular Specialist  [place] Office : [phone] [place] [address] [place] [address]  Tel:   [place] Office : [address] [facility] [place] [address]  Tel: [phone]    Pt lying in bed in NAD denies   	  MEDICATIONS:  enoxaparin Injectable 40 milliGRAM(s) SubCutaneous every 24 hours    piperacillin/tazobactam IVPB.. 3.375 Gram(s) IV Intermittent every 8 hours    albuterol    90 MICROgram(s) HFA Inhaler 2 Puff(s) Inhalation every 6 hours  albuterol/ipratropium for Nebulization 3 milliLiter(s) Nebulizer every 6 hours  benzonatate 100 milliGRAM(s) Oral every 8 hours  guaiFENesin Oral Liquid (Sugar-Free) 200 milliGRAM(s) Oral every 6 hours PRN  loratadine 10 milliGRAM(s) Oral daily    acetaminophen     Tablet .. 650 milliGRAM(s) Oral every 6 hours PRN    aluminum hydroxide/magnesium hydroxide/simethicone Suspension 30 milliLiter(s) Oral every 4 hours PRN  famotidine    Tablet 40 milliGRAM(s) Oral daily  pantoprazole    Tablet 40 milliGRAM(s) Oral before breakfast  senna 2 Tablet(s) Oral at bedtime    dapagliflozin 10 milliGRAM(s) Oral every 24 hours  dextrose 50% Injectable 25 Gram(s) IV Push once  dextrose 50% Injectable 25 Gram(s) IV Push once  dextrose 50% Injectable 12.5 Gram(s) IV Push once  dextrose Oral Gel 15 Gram(s) Oral once PRN  glucagon  Injectable 1 milliGRAM(s) IntraMuscular once  insulin lispro (ADMELOG) corrective regimen sliding scale   SubCutaneous at bedtime  insulin lispro (ADMELOG) corrective regimen sliding scale   SubCutaneous three times a day before meals  levothyroxine 50 MICROGram(s) Oral daily  predniSONE   Tablet 40 milliGRAM(s) Oral daily    dextrose 5%. 1000 milliLiter(s) IV Continuous <Continuous>  dextrose 5%. 1000 milliLiter(s) IV Continuous <Continuous>  multivitamin 1 Tablet(s) Oral daily      PAST MEDICAL/SURGICAL HISTORY  PAST MEDICAL & SURGICAL HISTORY:  Hypothyroidism      Multiple myeloma      Asthma      No significant past surgical history          SOCIAL HISTORY: Substance Use (street drugs): ( x ) never used  (  ) other:    FAMILY HISTORY:  FHx: leukemia        REVIEW OF SYSTEMS:  CONSTITUTIONAL: No fever, weight loss, or fatigue  EYES: No eye pain, visual disturbances, or discharge  ENMT:  No difficulty hearing, tinnitus, vertigo; No sinus or throat pain  BREASTS: No pain, masses, or nipple discharge  GASTROINTESTINAL: No abdominal or epigastric pain. No nausea, vomiting, or hematemesis; No diarrhea or constipation. No melena or hematochezia.  GENITOURINARY: No dysuria, frequency, hematuria, or incontinence  NEUROLOGICAL: No headaches, memory loss, loss of strength, numbness, or tremors  ENDOCRINE: No heat or cold intolerance; No hair loss  MUSCULOSKELETAL: No joint pain or swelling; No muscle, back, or extremity pain  PSYCHIATRIC: No depression, anxiety, mood swings, or difficulty sleeping  HEME/LYMPH: No easy bruising, or bleeding gums  All others negative    PHYSICAL EXAM:  T(C): 36.6 (12-07-23 @ 20:42), Max: 37 (12-07-23 @ 12:09)  HR: 98 (12-07-23 @ 20:42) (88 - 98)  BP: 116/83 (12-07-23 @ 20:42) (111/71 - 116/83)  RR: 18 (12-07-23 @ 20:42) (18 - 18)  SpO2: 93% (12-07-23 @ 20:42) (93% - 97%)  Wt(kg): --  I&O's Summary    06 Dec 2023 07:01  -  07 Dec 2023 07:00  --------------------------------------------------------  IN: 240 mL / OUT: 2800 mL / NET: -2560 mL    07 Dec 2023 07:01  -  07 Dec 2023 23:17  --------------------------------------------------------  IN: 100 mL / OUT: 350 mL / NET: -250 mL    GENERAL: NAD febrile   EYES: conjunctiva and sclera clear  ENMT: No tonsillar erythema, exudates, or enlargement   Cardiovascular: Normal S1 S2, No JVD, No murmurs  Respiratory: B/L basal ronchi   Gastrointestinal:  Soft, Non-tender, + BS	  Extremities: No edema                            9.3    6.51  )-----------( 106      ( 07 Dec 2023 07:15 )             29.2     12-07    144  |  105  |  24<H>  ----------------------------<  86  4.0   |  23  |  1.16    Ca    9.0      07 Dec 2023 07:15  Phos  3.5     12-07  Mg     2.4     12-07    TPro  6.3  /  Alb  3.3  /  TBili  0.3  /  DBili  x   /  AST  12  /  ALT  18  /  AlkPhos  58  12-07    proBNP:   Lipid Profile:   HgA1c:   TSH:     Consultant(s) Notes Reviewed:  [x ] YES  [ ] NO    Care Discussed with Consultants/Other Providers [ x] YES  [ ] NO    Imaging Personally Reviewed independently:  [x] YES  [ ] NO    All labs, radiologic studies, vitals, orders and medications list reviewed. Patient is seen and examined at bedside. Case discussed with medical team.

## 2023-12-07 NOTE — PROGRESS NOTE ADULT - PROBLEM SELECTOR PLAN 1
RVP +RSV  -Per ED note, pt diffusely wheezing on admission. S/p solumedrol 125mg IVP x1.   -No wheezing at this time  -Continue Prednisone 40mg PO qd x 5 days  -Duoneb q6h started  -Keep sats >90% with o2 PRN (currently RA)  -CT chest with b/l opacities, viral vs bacterial. Favor covering with short course of ABX given patient's history.

## 2023-12-08 VITALS
DIASTOLIC BLOOD PRESSURE: 82 MMHG | RESPIRATION RATE: 18 BRPM | OXYGEN SATURATION: 94 % | SYSTOLIC BLOOD PRESSURE: 123 MMHG | TEMPERATURE: 98 F | HEART RATE: 93 BPM

## 2023-12-08 LAB
GLUCOSE BLDC GLUCOMTR-MCNC: 110 MG/DL — HIGH (ref 70–99)
GLUCOSE BLDC GLUCOMTR-MCNC: 110 MG/DL — HIGH (ref 70–99)
GLUCOSE BLDC GLUCOMTR-MCNC: 128 MG/DL — HIGH (ref 70–99)
GLUCOSE BLDC GLUCOMTR-MCNC: 128 MG/DL — HIGH (ref 70–99)
GLUCOSE BLDC GLUCOMTR-MCNC: 177 MG/DL — HIGH (ref 70–99)
GLUCOSE BLDC GLUCOMTR-MCNC: 177 MG/DL — HIGH (ref 70–99)

## 2023-12-08 PROCEDURE — 86850 RBC ANTIBODY SCREEN: CPT

## 2023-12-08 PROCEDURE — 87086 URINE CULTURE/COLONY COUNT: CPT

## 2023-12-08 PROCEDURE — 99222 1ST HOSP IP/OBS MODERATE 55: CPT

## 2023-12-08 PROCEDURE — 86880 COOMBS TEST DIRECT: CPT

## 2023-12-08 PROCEDURE — 84100 ASSAY OF PHOSPHORUS: CPT

## 2023-12-08 PROCEDURE — 82962 GLUCOSE BLOOD TEST: CPT

## 2023-12-08 PROCEDURE — 82607 VITAMIN B-12: CPT

## 2023-12-08 PROCEDURE — 86901 BLOOD TYPING SEROLOGIC RH(D): CPT

## 2023-12-08 PROCEDURE — 96374 THER/PROPH/DIAG INJ IV PUSH: CPT

## 2023-12-08 PROCEDURE — 84295 ASSAY OF SERUM SODIUM: CPT

## 2023-12-08 PROCEDURE — 87040 BLOOD CULTURE FOR BACTERIA: CPT

## 2023-12-08 PROCEDURE — 99232 SBSQ HOSP IP/OBS MODERATE 35: CPT

## 2023-12-08 PROCEDURE — 84132 ASSAY OF SERUM POTASSIUM: CPT

## 2023-12-08 PROCEDURE — 82947 ASSAY GLUCOSE BLOOD QUANT: CPT

## 2023-12-08 PROCEDURE — C8924: CPT

## 2023-12-08 PROCEDURE — 82746 ASSAY OF FOLIC ACID SERUM: CPT

## 2023-12-08 PROCEDURE — 74177 CT ABD & PELVIS W/CONTRAST: CPT

## 2023-12-08 PROCEDURE — 86922 COMPATIBILITY TEST ANTIGLOB: CPT

## 2023-12-08 PROCEDURE — 87070 CULTURE OTHR SPECIMN AEROBIC: CPT

## 2023-12-08 PROCEDURE — 81003 URINALYSIS AUTO W/O SCOPE: CPT

## 2023-12-08 PROCEDURE — 85014 HEMATOCRIT: CPT

## 2023-12-08 PROCEDURE — 85610 PROTHROMBIN TIME: CPT

## 2023-12-08 PROCEDURE — 83605 ASSAY OF LACTIC ACID: CPT

## 2023-12-08 PROCEDURE — 82435 ASSAY OF BLOOD CHLORIDE: CPT

## 2023-12-08 PROCEDURE — 86970 RBC PRETX INCUBATJ W/CHEMICL: CPT

## 2023-12-08 PROCEDURE — 86900 BLOOD TYPING SEROLOGIC ABO: CPT

## 2023-12-08 PROCEDURE — 83540 ASSAY OF IRON: CPT

## 2023-12-08 PROCEDURE — 83735 ASSAY OF MAGNESIUM: CPT

## 2023-12-08 PROCEDURE — 71046 X-RAY EXAM CHEST 2 VIEWS: CPT

## 2023-12-08 PROCEDURE — 82803 BLOOD GASES ANY COMBINATION: CPT

## 2023-12-08 PROCEDURE — 71250 CT THORAX DX C-: CPT

## 2023-12-08 PROCEDURE — 99285 EMERGENCY DEPT VISIT HI MDM: CPT | Mod: 25

## 2023-12-08 PROCEDURE — 83880 ASSAY OF NATRIURETIC PEPTIDE: CPT

## 2023-12-08 PROCEDURE — 80053 COMPREHEN METABOLIC PANEL: CPT

## 2023-12-08 PROCEDURE — 82728 ASSAY OF FERRITIN: CPT

## 2023-12-08 PROCEDURE — 87637 SARSCOV2&INF A&B&RSV AMP PRB: CPT

## 2023-12-08 PROCEDURE — 85730 THROMBOPLASTIN TIME PARTIAL: CPT

## 2023-12-08 PROCEDURE — 84145 PROCALCITONIN (PCT): CPT

## 2023-12-08 PROCEDURE — 85027 COMPLETE CBC AUTOMATED: CPT

## 2023-12-08 PROCEDURE — 85018 HEMOGLOBIN: CPT

## 2023-12-08 PROCEDURE — 93321 DOPPLER ECHO F-UP/LMTD STD: CPT

## 2023-12-08 PROCEDURE — 82330 ASSAY OF CALCIUM: CPT

## 2023-12-08 PROCEDURE — 85025 COMPLETE CBC W/AUTO DIFF WBC: CPT

## 2023-12-08 PROCEDURE — 83550 IRON BINDING TEST: CPT

## 2023-12-08 PROCEDURE — 84484 ASSAY OF TROPONIN QUANT: CPT

## 2023-12-08 PROCEDURE — 94640 AIRWAY INHALATION TREATMENT: CPT

## 2023-12-08 RX ORDER — LEVOFLOXACIN 5 MG/ML
1 INJECTION, SOLUTION INTRAVENOUS
Qty: 3 | Refills: 0
Start: 2023-12-08 | End: 2023-12-10

## 2023-12-08 RX ORDER — FOLIC ACID 0.8 MG
1 TABLET ORAL DAILY
Refills: 0 | Status: DISCONTINUED | OUTPATIENT
Start: 2023-12-08 | End: 2023-12-08

## 2023-12-08 RX ORDER — SIMETHICONE 80 MG/1
80 TABLET, CHEWABLE ORAL EVERY 12 HOURS
Refills: 0 | Status: DISCONTINUED | OUTPATIENT
Start: 2023-12-08 | End: 2023-12-08

## 2023-12-08 RX ORDER — AZITHROMYCIN 500 MG/1
1 TABLET, FILM COATED ORAL
Refills: 0 | DISCHARGE

## 2023-12-08 RX ADMIN — Medication 3 MILLILITER(S): at 12:39

## 2023-12-08 RX ADMIN — Medication 1 MILLIGRAM(S): at 12:38

## 2023-12-08 RX ADMIN — SIMETHICONE 80 MILLIGRAM(S): 80 TABLET, CHEWABLE ORAL at 15:17

## 2023-12-08 RX ADMIN — PIPERACILLIN AND TAZOBACTAM 25 GRAM(S): 4; .5 INJECTION, POWDER, LYOPHILIZED, FOR SOLUTION INTRAVENOUS at 05:35

## 2023-12-08 RX ADMIN — LORATADINE 10 MILLIGRAM(S): 10 TABLET ORAL at 12:38

## 2023-12-08 RX ADMIN — ENOXAPARIN SODIUM 40 MILLIGRAM(S): 100 INJECTION SUBCUTANEOUS at 05:34

## 2023-12-08 RX ADMIN — DAPAGLIFLOZIN 10 MILLIGRAM(S): 10 TABLET, FILM COATED ORAL at 09:05

## 2023-12-08 RX ADMIN — FAMOTIDINE 40 MILLIGRAM(S): 10 INJECTION INTRAVENOUS at 12:38

## 2023-12-08 RX ADMIN — Medication 50 MICROGRAM(S): at 05:34

## 2023-12-08 RX ADMIN — Medication 40 MILLIGRAM(S): at 05:34

## 2023-12-08 RX ADMIN — Medication 1200 MILLIGRAM(S): at 17:31

## 2023-12-08 RX ADMIN — PIPERACILLIN AND TAZOBACTAM 25 GRAM(S): 4; .5 INJECTION, POWDER, LYOPHILIZED, FOR SOLUTION INTRAVENOUS at 15:18

## 2023-12-08 RX ADMIN — Medication 100 MILLIGRAM(S): at 05:34

## 2023-12-08 RX ADMIN — Medication 1 TABLET(S): at 12:38

## 2023-12-08 RX ADMIN — PANTOPRAZOLE SODIUM 40 MILLIGRAM(S): 20 TABLET, DELAYED RELEASE ORAL at 05:35

## 2023-12-08 RX ADMIN — Medication 100 MILLIGRAM(S): at 15:17

## 2023-12-08 RX ADMIN — Medication 3 MILLILITER(S): at 17:31

## 2023-12-08 RX ADMIN — Medication 200 MILLIGRAM(S): at 15:17

## 2023-12-08 RX ADMIN — Medication 3 MILLILITER(S): at 05:34

## 2023-12-08 NOTE — PROGRESS NOTE ADULT - PROBLEM SELECTOR PLAN 5
CT chest with multiple subcentimeter nodular opacities  -Unchanged since Oct 2023 imaging  -Suggest repeat CT chest in 3 months.
CT chest with multiple subcentimeter nodular opacities  -Unchanged since Oct 2023 imaging  -Suggest repeat CT chest in 3 months.
Hx of MM (on weekly chemo/immunotherapy).   - Onc follow up
CT chest with multiple subcentimeter nodular opacities  -Unchanged since Oct 2023 imaging  -Suggest repeat CT chest in 3 months.

## 2023-12-08 NOTE — DISCHARGE NOTE PROVIDER - NSDCCAREPROVSEEN_GEN_ALL_CORE_FT
Rupinder, Galen Rendon, Laureano Rodríguez, Todd Le, Kamran Guzman, Melissa Sinha, Alondra Grewal, Galen Boudreaux, Preston CLAUDIO

## 2023-12-08 NOTE — PROGRESS NOTE ADULT - SUBJECTIVE AND OBJECTIVE BOX
INTERVAL HPI/OVERNIGHT EVENTS:  Patient S&E at bedside. No o/n events,     VITAL SIGNS:  T(F): 97.5 (12-08-23 @ 04:39)  HR: 96 (12-08-23 @ 04:39)  BP: 108/77 (12-08-23 @ 04:39)  RR: 18 (12-08-23 @ 04:39)  SpO2: 99% (12-08-23 @ 04:39)  Wt(kg): --    PHYSICAL EXAM:    Constitutional: NAD  Eyes: EOMI, sclera non-icteric  Neck: supple  Respiratory: CTAB, no wheezes or crackles   Cardiovascular: RRR  Gastrointestinal: soft, NTND, + BS  Extremities: no cyanosis, clubbing or edema   Neurological: awake and alert      MEDICATIONS  (STANDING):  albuterol    90 MICROgram(s) HFA Inhaler 2 Puff(s) Inhalation every 6 hours  albuterol/ipratropium for Nebulization 3 milliLiter(s) Nebulizer every 6 hours  benzonatate 100 milliGRAM(s) Oral every 8 hours  dapagliflozin 10 milliGRAM(s) Oral every 24 hours  dextrose 5%. 1000 milliLiter(s) (50 mL/Hr) IV Continuous <Continuous>  dextrose 5%. 1000 milliLiter(s) (100 mL/Hr) IV Continuous <Continuous>  dextrose 50% Injectable 25 Gram(s) IV Push once  dextrose 50% Injectable 12.5 Gram(s) IV Push once  dextrose 50% Injectable 25 Gram(s) IV Push once  enoxaparin Injectable 40 milliGRAM(s) SubCutaneous every 24 hours  famotidine    Tablet 40 milliGRAM(s) Oral daily  folic acid 1 milliGRAM(s) Oral daily  glucagon  Injectable 1 milliGRAM(s) IntraMuscular once  insulin lispro (ADMELOG) corrective regimen sliding scale   SubCutaneous three times a day before meals  insulin lispro (ADMELOG) corrective regimen sliding scale   SubCutaneous at bedtime  levothyroxine 50 MICROGram(s) Oral daily  loratadine 10 milliGRAM(s) Oral daily  multivitamin 1 Tablet(s) Oral daily  pantoprazole    Tablet 40 milliGRAM(s) Oral before breakfast  piperacillin/tazobactam IVPB.. 3.375 Gram(s) IV Intermittent every 8 hours  predniSONE   Tablet 40 milliGRAM(s) Oral daily  senna 2 Tablet(s) Oral at bedtime    MEDICATIONS  (PRN):  acetaminophen     Tablet .. 650 milliGRAM(s) Oral every 6 hours PRN Temp greater or equal to 38C (100.4F), Mild Pain (1 - 3)  aluminum hydroxide/magnesium hydroxide/simethicone Suspension 30 milliLiter(s) Oral every 4 hours PRN Gas  dextrose Oral Gel 15 Gram(s) Oral once PRN Blood Glucose LESS THAN 70 milliGRAM(s)/deciliter  guaiFENesin Oral Liquid (Sugar-Free) 200 milliGRAM(s) Oral every 6 hours PRN Cough      Allergies    sulfa drugs (Unknown)    Intolerances        LABS:                        9.3    6.51  )-----------( 106      ( 07 Dec 2023 07:15 )             29.2     12-07    144  |  105  |  24<H>  ----------------------------<  86  4.0   |  23  |  1.16    Ca    9.0      07 Dec 2023 07:15  Phos  3.5     12-07  Mg     2.4     12-07    TPro  6.3  /  Alb  3.3  /  TBili  0.3  /  DBili  x   /  AST  12  /  ALT  18  /  AlkPhos  58  12-07      Urinalysis Basic - ( 07 Dec 2023 07:15 )    Color: x / Appearance: x / SG: x / pH: x  Gluc: 86 mg/dL / Ketone: x  / Bili: x / Urobili: x   Blood: x / Protein: x / Nitrite: x   Leuk Esterase: x / RBC: x / WBC x   Sq Epi: x / Non Sq Epi: x / Bacteria: x        RADIOLOGY & ADDITIONAL TESTS:  Studies reviewed.

## 2023-12-08 NOTE — CONSULT NOTE ADULT - SUBJECTIVE AND OBJECTIVE BOX
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 Reported Tmax of 102F at home yesterday. Was taking ibuprofen PRN at home, but fevers would quickly return. Also was taking azithromycin at home for the past 3 days without improvement and so was sent to the hospital. Denied n/v, runny nose, CP, abdominal pain, dysuria, hematuria, hematochezia, melena, diarrhea, constipation, recent travel, sick contacts. Of note, pt was started on several medications as GDMT for his CHF last admission in 10/2023, but son-in-law noted that he was taken off the antihypertensives because his blood pressure would drop to 70s.    In ED: Tmax 99.8F oral, HR 100s, SBP 100s-130s, RR 18-20, sating 95-96% on RA. However, per ED documentation, ambulatory O2 sat dropped to 80s and associated with worsened wheeze, tachypnea, and tachycardia. RSV positive. CXR w/o focal consolidation. Received ofirmev 1g and solumedrol 125mg. Admitted to Medicine for further management. (04 Dec 2023 20:29)    On presentation, chest CT noted pneumobilia and subsequent abdominal CT for further evaluation showed decrease in air. Patient on Zosyn. ID recommended surgical evaluation.   Currently on isolation for RSV PNA.   Per patient, last instrumentation by GI was 3 years ago.   LFTs wnl  No leukocytosis  Exam benign.       PAST MEDICAL & SURGICAL HISTORY:  Hypothyroidism      Multiple myeloma      Asthma      No significant past surgical history        [  ] No significant past history as reviewed with the patient and family    FAMILY HISTORY:  FHx: leukemia      [  ] Family history not pertinent as reviewed with the patient and family    SOCIAL HISTORY:    MEDICATIONS  (STANDING):  albuterol    90 MICROgram(s) HFA Inhaler 2 Puff(s) Inhalation every 6 hours  albuterol/ipratropium for Nebulization 3 milliLiter(s) Nebulizer every 6 hours  benzonatate 100 milliGRAM(s) Oral every 8 hours  dapagliflozin 10 milliGRAM(s) Oral every 24 hours  dextrose 5%. 1000 milliLiter(s) (50 mL/Hr) IV Continuous <Continuous>  dextrose 5%. 1000 milliLiter(s) (100 mL/Hr) IV Continuous <Continuous>  dextrose 50% Injectable 25 Gram(s) IV Push once  dextrose 50% Injectable 12.5 Gram(s) IV Push once  dextrose 50% Injectable 25 Gram(s) IV Push once  enoxaparin Injectable 40 milliGRAM(s) SubCutaneous every 24 hours  famotidine    Tablet 40 milliGRAM(s) Oral daily  glucagon  Injectable 1 milliGRAM(s) IntraMuscular once  insulin lispro (ADMELOG) corrective regimen sliding scale   SubCutaneous at bedtime  insulin lispro (ADMELOG) corrective regimen sliding scale   SubCutaneous three times a day before meals  levothyroxine 50 MICROGram(s) Oral daily  loratadine 10 milliGRAM(s) Oral daily  multivitamin 1 Tablet(s) Oral daily  pantoprazole    Tablet 40 milliGRAM(s) Oral before breakfast  piperacillin/tazobactam IVPB.. 3.375 Gram(s) IV Intermittent every 8 hours  predniSONE   Tablet 40 milliGRAM(s) Oral daily  senna 2 Tablet(s) Oral at bedtime    MEDICATIONS  (PRN):  acetaminophen     Tablet .. 650 milliGRAM(s) Oral every 6 hours PRN Temp greater or equal to 38C (100.4F), Mild Pain (1 - 3)  aluminum hydroxide/magnesium hydroxide/simethicone Suspension 30 milliLiter(s) Oral every 4 hours PRN Gas  dextrose Oral Gel 15 Gram(s) Oral once PRN Blood Glucose LESS THAN 70 milliGRAM(s)/deciliter  guaiFENesin Oral Liquid (Sugar-Free) 200 milliGRAM(s) Oral every 6 hours PRN Cough    Allergies    sulfa drugs (Unknown)    Intolerances        Vital Signs Last 24 Hrs  T(C): 36.4 (08 Dec 2023 04:39), Max: 37 (07 Dec 2023 12:09)  T(F): 97.5 (08 Dec 2023 04:39), Max: 98.6 (07 Dec 2023 12:09)  HR: 96 (08 Dec 2023 04:39) (88 - 98)  BP: 108/77 (08 Dec 2023 04:39) (108/77 - 116/83)  BP(mean): --  RR: 18 (08 Dec 2023 04:39) (18 - 18)  SpO2: 99% (08 Dec 2023 04:39) (93% - 99%)    Parameters below as of 08 Dec 2023 04:39  Patient On (Oxygen Delivery Method): nasal cannula  O2 Flow (L/min): 2    Daily     Daily     GENERAL: No acute distress, resting in bed, well-groomed, well-developed, pleasant  HEART: Palpable radial pulse, Regular rate  RESPIRATORY: no increased work of breathing, on room air  ABDOMEN: Obese, slight distended, no TTP, no rebound, no guarding  NEUROLOGY: A&Ox3, nonfocal, moving all extremities  EXTREMITIES:  2+ Peripheral Pulses, No clubbing, cyanosis, or edema  SKIN: warm, dry, normal color, no rash or abnormal lesions                         9.3    6.51  )-----------( 106      ( 07 Dec 2023 07:15 )             29.2     12-07    144  |  105  |  24<H>  ----------------------------<  86  4.0   |  23  |  1.16    Ca    9.0      07 Dec 2023 07:15  Phos  3.5     12-07  Mg     2.4     12-07    TPro  6.3  /  Alb  3.3  /  TBili  0.3  /  DBili  x   /  AST  12  /  ALT  18  /  AlkPhos  58  12-07      Urinalysis Basic - ( 07 Dec 2023 07:15 )    Color: x / Appearance: x / SG: x / pH: x  Gluc: 86 mg/dL / Ketone: x  / Bili: x / Urobili: x   Blood: x / Protein: x / Nitrite: x   Leuk Esterase: x / RBC: x / WBC x   Sq Epi: x / Non Sq Epi: x / Bacteria: x        IMAGING STUDIES:  < from: CT Abdomen and Pelvis w/ IV Cont (12.06.23 @ 20:34) >    ACC: [id number] EXAM:  CT ABDOMEN AND PELVIS IC   ORDERED BY: RAFITA EASLEY     PROCEDURE DATE:  12/06/2023          INTERPRETATION:  CLINICAL INFORMATION: Pneumobilia seen on prior CT.    COMPARISON: CT chest 12/5/2023, CTA chest 10/12/2023    CONTRAST/COMPLICATIONS:  IV Contrast: Omnipaque 350  90 cc administered   10 cc discarded  Oral Contrast: NONE  Complications: None reported at time of study completion    PROCEDURE:  CT of the Abdomen and Pelvis was performed.  Sagittal and coronal reformats were performed.    FINDINGS:  LOWER CHEST: Bibasilar groundglass opacities, better evaluated on recent   chest CT.    LIVER: Within normal limits.  BILE DUCTS: Trace central pneumobilia, which has decreased since prior   study. No intra or extrahepatic biliary ductal dilatation. No abnormal   enhancement of the bile ducts.  GALLBLADDER: Cholelithiasis. Decreased intraluminal air.  SPLEEN: Within normal limits.  PANCREAS: Within normal limits.  ADRENALS: Within normal limits.  KIDNEYS/URETERS: No hydronephrosis. Left renal cyst. Indeterminate 4 mm   lesion arising from the left lower pole (3, 69).    BLADDER: Within normal limits.  REPRODUCTIVE ORGANS: Prostate within normal limits.    BOWEL: No bowel obstruction. Appendix is normal. Small hiatal hernia.  PERITONEUM: No ascites.  VESSELS: Within normal limits.  RETROPERITONEUM/LYMPH NODES: No lymphadenopathy.  ABDOMINAL WALL: Within normal limits.  BONES: Within normal limits.    IMPRESSION:  Decreased central pneumobilia and air in the gallbladder, which may be   related to prior sphincterotomy. Correlate for recent instrumentation.  An indeterminate tiny lesion arising from the lower pole of left kidney   for which further characterization with MRI is advised on outpatient   basis.    --- End of Report ---          ZOYA ODOM MD; Resident Radiologist  This document has been electronically signed.  DEMETRIO MARTINEZ MD; Attending Radiologist  This document has been electronically signed. Dec  7 2023 10:21AM    < end of copied text >  < from: CT Chest No Cont (12.05.23 @ 20:02) >  IMPRESSION:.    Findings compatible with multifocal pneumonia, new compared to   10/12/2023. Recommend CT chest follow-up in 3 months to ensure clearing.    Indeterminate pulmonary nodules measuring up to 0.7 cm; recommend   attention on follow-up examination.    Pneumobilia and gas within the gallbladder, new compared to 10/12/2023;   infection should be considered as an etiology if there has been no   procedure performed (such as ERCP).    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 Reported Tmax of 102F at home yesterday. Was taking ibuprofen PRN at home, but fevers would quickly return. Also was taking azithromycin at home for the past 3 days without improvement and so was sent to the hospital. Denied n/v, runny nose, CP, abdominal pain, dysuria, hematuria, hematochezia, melena, diarrhea, constipation, recent travel, sick contacts. Of note, pt was started on several medications as GDMT for his CHF last admission in 10/2023, but son-in-law noted that he was taken off the antihypertensives because his blood pressure would drop to 70s.    In ED: Tmax 99.8F oral, HR 100s, SBP 100s-130s, RR 18-20, sating 95-96% on RA. However, per ED documentation, ambulatory O2 sat dropped to 80s and associated with worsened wheeze, tachypnea, and tachycardia. RSV positive. CXR w/o focal consolidation. Received ofirmev 1g and solumedrol 125mg. Admitted to Medicine for further management. (04 Dec 2023 20:29)    On presentation, chest CT noted pneumobilia and subsequent abdominal CT for further evaluation showed decrease in air. Patient on Zosyn. ID recommended surgical evaluation.   Currently on isolation for RSV PNA.   Per patient, last instrumentation by GI was 3 years ago.   LFTs wnl  No leukocytosis  Exam benign.       PAST MEDICAL & SURGICAL HISTORY:  Hypothyroidism      Multiple myeloma      Asthma      No significant past surgical history        [  ] No significant past history as reviewed with the patient and family    FAMILY HISTORY:  FHx: leukemia      [  ] Family history not pertinent as reviewed with the patient and family    SOCIAL HISTORY:    MEDICATIONS  (STANDING):  albuterol    90 MICROgram(s) HFA Inhaler 2 Puff(s) Inhalation every 6 hours  albuterol/ipratropium for Nebulization 3 milliLiter(s) Nebulizer every 6 hours  benzonatate 100 milliGRAM(s) Oral every 8 hours  dapagliflozin 10 milliGRAM(s) Oral every 24 hours  dextrose 5%. 1000 milliLiter(s) (50 mL/Hr) IV Continuous <Continuous>  dextrose 5%. 1000 milliLiter(s) (100 mL/Hr) IV Continuous <Continuous>  dextrose 50% Injectable 25 Gram(s) IV Push once  dextrose 50% Injectable 12.5 Gram(s) IV Push once  dextrose 50% Injectable 25 Gram(s) IV Push once  enoxaparin Injectable 40 milliGRAM(s) SubCutaneous every 24 hours  famotidine    Tablet 40 milliGRAM(s) Oral daily  glucagon  Injectable 1 milliGRAM(s) IntraMuscular once  insulin lispro (ADMELOG) corrective regimen sliding scale   SubCutaneous at bedtime  insulin lispro (ADMELOG) corrective regimen sliding scale   SubCutaneous three times a day before meals  levothyroxine 50 MICROGram(s) Oral daily  loratadine 10 milliGRAM(s) Oral daily  multivitamin 1 Tablet(s) Oral daily  pantoprazole    Tablet 40 milliGRAM(s) Oral before breakfast  piperacillin/tazobactam IVPB.. 3.375 Gram(s) IV Intermittent every 8 hours  predniSONE   Tablet 40 milliGRAM(s) Oral daily  senna 2 Tablet(s) Oral at bedtime    MEDICATIONS  (PRN):  acetaminophen     Tablet .. 650 milliGRAM(s) Oral every 6 hours PRN Temp greater or equal to 38C (100.4F), Mild Pain (1 - 3)  aluminum hydroxide/magnesium hydroxide/simethicone Suspension 30 milliLiter(s) Oral every 4 hours PRN Gas  dextrose Oral Gel 15 Gram(s) Oral once PRN Blood Glucose LESS THAN 70 milliGRAM(s)/deciliter  guaiFENesin Oral Liquid (Sugar-Free) 200 milliGRAM(s) Oral every 6 hours PRN Cough    Allergies    sulfa drugs (Unknown)    Intolerances        Vital Signs Last 24 Hrs  T(C): 36.4 (08 Dec 2023 04:39), Max: 37 (07 Dec 2023 12:09)  T(F): 97.5 (08 Dec 2023 04:39), Max: 98.6 (07 Dec 2023 12:09)  HR: 96 (08 Dec 2023 04:39) (88 - 98)  BP: 108/77 (08 Dec 2023 04:39) (108/77 - 116/83)  BP(mean): --  RR: 18 (08 Dec 2023 04:39) (18 - 18)  SpO2: 99% (08 Dec 2023 04:39) (93% - 99%)    Parameters below as of 08 Dec 2023 04:39  Patient On (Oxygen Delivery Method): nasal cannula  O2 Flow (L/min): 2    Daily     Daily     GENERAL: No acute distress, resting in bed, well-groomed, well-developed, pleasant  HEART: Palpable radial pulse, Regular rate  RESPIRATORY: no increased work of breathing, on room air  ABDOMEN: Obese, slight distended, no TTP, no rebound, no guarding  NEUROLOGY: A&Ox3, nonfocal, moving all extremities  EXTREMITIES:  2+ Peripheral Pulses, No clubbing, cyanosis, or edema  SKIN: warm, dry, normal color, no rash or abnormal lesions                         9.3    6.51  )-----------( 106      ( 07 Dec 2023 07:15 )             29.2     12-07    144  |  105  |  24<H>  ----------------------------<  86  4.0   |  23  |  1.16    Ca    9.0      07 Dec 2023 07:15  Phos  3.5     12-07  Mg     2.4     12-07    TPro  6.3  /  Alb  3.3  /  TBili  0.3  /  DBili  x   /  AST  12  /  ALT  18  /  AlkPhos  58  12-07      Urinalysis Basic - ( 07 Dec 2023 07:15 )    Color: x / Appearance: x / SG: x / pH: x  Gluc: 86 mg/dL / Ketone: x  / Bili: x / Urobili: x   Blood: x / Protein: x / Nitrite: x   Leuk Esterase: x / RBC: x / WBC x   Sq Epi: x / Non Sq Epi: x / Bacteria: x        IMAGING STUDIES:  < from: CT Abdomen and Pelvis w/ IV Cont (12.06.23 @ 20:34) >    ACC: [id number] EXAM:  CT ABDOMEN AND PELVIS IC   ORDERED BY: RAFITA EASLEY     PROCEDURE DATE:  12/06/2023          INTERPRETATION:  CLINICAL INFORMATION: Pneumobilia seen on prior CT.    COMPARISON: CT chest 12/5/2023, CTA chest 10/12/2023    CONTRAST/COMPLICATIONS:  IV Contrast: Omnipaque 350  90 cc administered   10 cc discarded  Oral Contrast: NONE  Complications: None reported at time of study completion    PROCEDURE:  CT of the Abdomen and Pelvis was performed.  Sagittal and coronal reformats were performed.    FINDINGS:  LOWER CHEST: Bibasilar groundglass opacities, better evaluated on recent   chest CT.    LIVER: Within normal limits.  BILE DUCTS: Trace central pneumobilia, which has decreased since prior   study. No intra or extrahepatic biliary ductal dilatation. No abnormal   enhancement of the bile ducts.  GALLBLADDER: Cholelithiasis. Decreased intraluminal air.  SPLEEN: Within normal limits.  PANCREAS: Within normal limits.  ADRENALS: Within normal limits.  KIDNEYS/URETERS: No hydronephrosis. Left renal cyst. Indeterminate 4 mm   lesion arising from the left lower pole (3, 69).    BLADDER: Within normal limits.  REPRODUCTIVE ORGANS: Prostate within normal limits.    BOWEL: No bowel obstruction. Appendix is normal. Small hiatal hernia.  PERITONEUM: No ascites.  VESSELS: Within normal limits.  RETROPERITONEUM/LYMPH NODES: No lymphadenopathy.  ABDOMINAL WALL: Within normal limits.  BONES: Within normal limits.    IMPRESSION:  Decreased central pneumobilia and air in the gallbladder, which may be   related to prior sphincterotomy. Correlate for recent instrumentation.  An indeterminate tiny lesion arising from the lower pole of left kidney   for which further characterization with MRI is advised on outpatient   basis.    --- End of Report ---          ZOYA ODOM MD; Resident Radiologist  This document has been electronically signed.  DEMETRIO MARTINEZ MD; Attending Radiologist  This document has been electronically signed. Dec  7 2023 10:21AM    < end of copied text >  < from: CT Chest No Cont (12.05.23 @ 20:02) >  IMPRESSION:.    Findings compatible with multifocal pneumonia, new compared to   10/12/2023. Recommend CT chest follow-up in 3 months to ensure clearing.    Indeterminate pulmonary nodules measuring up to 0.7 cm; recommend   attention on follow-up examination.    Pneumobilia and gas within the gallbladder, new compared to 10/12/2023;   infection should be considered as an etiology if there has been no   procedure performed (such as ERCP).    < end of copied text >

## 2023-12-08 NOTE — DISCHARGE NOTE PROVIDER - NSDCCPCAREPLAN_GEN_ALL_CORE_FT
PRINCIPAL DISCHARGE DIAGNOSIS  Diagnosis: Pneumonia  Assessment and Plan of Treatment: c/w Andibiotics, Improved, F/U PMD in 1 week      SECONDARY DISCHARGE DIAGNOSES  Diagnosis: Lung nodule  Assessment and Plan of Treatment: f/u CT Chest in 3 months

## 2023-12-08 NOTE — DISCHARGE NOTE PROVIDER - PROVIDER TOKENS
PROVIDER:[TOKEN:[[id number]],FOLLOWUP:[2 weeks]],PROVIDER:[TOKEN:[[id number]],FOLLOWUP:[2 weeks]] PROVIDER:[TOKEN:[[id number]],FOLLOWUP:[2 weeks]],PROVIDER:[TOKEN:[[id number]],FOLLOWUP:[2 weeks]]

## 2023-12-08 NOTE — PROGRESS NOTE ADULT - PROBLEM SELECTOR PLAN 1
RVP +RSV  -Per ED note, pt diffusely wheezing on admission. S/p solumedrol 125mg IVP x1.   -Wheezing now improved   -Continue Prednisone 40mg PO qd x 5 days  -Duoneb q6h   -Keep sats >90% with o2 PRN (currently RA)  -Mucinex 1200mg PO BID x 5 days   -CT chest with b/l opacities, viral vs bacterial. Favor covering with short course of ABX given patient's history.

## 2023-12-08 NOTE — PROGRESS NOTE ADULT - PROVIDER SPECIALTY LIST ADULT
Cardiology
Heme/Onc
Internal Medicine
Infectious Disease
Internal Medicine
Infectious Disease
Internal Medicine
Cardiology
Pulmonology
Cardiology
Internal Medicine
Pulmonology
Pulmonology

## 2023-12-08 NOTE — DISCHARGE NOTE PROVIDER - NSDCMRMEDTOKEN_GEN_ALL_CORE_FT
dapagliflozin 10 mg oral tablet: 1 tab(s) orally every 24 hours  famotidine 40 mg oral tablet: 1 tab(s) orally once a day  guaiFENesin 100 mg/5 mL oral liquid: 10 milliliter(s) orally every 6 hours As needed Cough  guaiFENesin 1200 mg oral tablet, extended release: 1 tab(s) orally every 12 hours  ibuprofen 400 mg oral tablet: 1 tab(s) orally 3 times a day as needed for  fever or pain  levoFLOXacin 500 mg oral tablet: 1 tab(s) orally once a day  levothyroxine 50 mcg (0.05 mg) oral tablet: 1 tab(s) orally once a day  loratadine 10 mg oral tablet: 1 tab(s) orally once a day  oxyCODONE 5 mg oral tablet: 1 tab(s) orally every 4 hours as needed for  severe pain  predniSONE 20 mg oral tablet: 2 tab(s) orally once a day  senna (sennosides) 8.6 mg oral tablet: 2 tab(s) orally once a day (at bedtime)

## 2023-12-08 NOTE — PROGRESS NOTE ADULT - PROBLEM SELECTOR PLAN 3
by hx  -Management per primary team.
Found to be RSV positive (12/4/23)  - c/w symptomatic management  - monitor respiratory status  - maintain isolation precautions

## 2023-12-08 NOTE — PROGRESS NOTE ADULT - ASSESSMENT
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 Presents with SOB/VELAZCO, productive cough, fevers, chills, and severe fatigue for the past ~1 wk. RVP +RSV. While in ED, pt noted to be diffusely wheezing and hypoxic to 80s on exertion.  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 Presents with SOB/VELAZCO, productive cough, fevers, chills, and severe fatigue for the past ~1 wk. RVP +RSV. While in ED, pt noted to be diffusely wheezing and hypoxic to 80s on exertion.

## 2023-12-08 NOTE — CONSULT NOTE ADULT - ASSESSMENT
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 Per patient and family, he has history of gallstones and GI intervention 3 years ago. No evidence of fistula. Abdominal exam benign.     Plan/Rec:  - No acute surgical intervention  - Please have patient follow up in surgery clinic to discuss cholecystectomy after discharge  [address], [place] [address] [phone]    Patient discussed with Attending Surgeon Dr. 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 Per patient and family, he has history of gallstones and GI intervention 3 years ago. No evidence of fistula. Abdominal exam benign.     Plan/Rec:  - No acute surgical intervention  - Please have patient follow up in surgery clinic to discuss cholecystectomy after discharge  [address], [place] [address] [phone]    Patient discussed with Attending Surgeon Dr. Rodríguez  ACS/Trauma Surgery  [id number]

## 2023-12-08 NOTE — PROGRESS NOTE ADULT - ASSESSMENT
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 Concern for AHRF, likely in setting of asthma exacerbation 2/2 RSV infection.      Acute respiratory failure with hypoxia.   - Ambulatory O2 sat dropped to 80s on ambulation in ER. Likely in setting of Asthma Exacerbation 2/2 RSV infection.  - CXR w/o focal consolidation   - CT Chest with PNA -GGO in B/L Lung fields, multiple nodular opacities, Dilated ascending aorta 4.3 CM, Pneumobilia and gas within the GB   - S/P Solumedrol 125mg IVP X 1 in ER for reported wheezing, transitioned to PO Prednisone 40 PO qd X 5 days w/ PPI for GI PPX   - C/w Duoneb q6h   - Incentive Spirometer   - Procal 0.21  - Check Sputum culture --> Pending. patient reports cough is not productive.   - Management of Asthma exacerbation as below   - Monitor respiratory status, wean supplemental O2 as tolerated (patient is not on O2 at baseline)  - Pulm evaluation appreciated; F/u recs      Acute asthma exacerbation.   - Hx of asthma, not on inhalers at home. Presented with worsening SOB/VELAZCO --> Exacerbated likely due to RSV    - S/P solumedrol 125mg IVP in ER, C/w prednisone 40mg PO x5 days  - C/w duonebs q6h   - Keep sats >90% with O2 PRN (currently 2LNC). Wean O2 as tolerated    RSV infection.   - Found to be RSV positive (12/4/23)  - C/w symptomatic management as above   - Monitor respiratory status  - Maintain isolation precautions    Pneumonia   - CT Chest as above with B/L GGO Opacities.  - Procal 0.21   - ABX as per ID --> On Zosyn for 5 day course. Transition to Levaquin on DC to complete course   - ID and pulm evaluations consulted and appreciated; F/u recs     NICM (nonischemic cardiomyopathy).   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 On admission, proBNP WNL and clinically euvolemic appearing.   - Not in active CHF exacerbation  - was started on ASA/statin last admission for "non-obstructive CAD" but [facility] (10/15/23) reported no disease; pt stated not taking theses medication at home, will continue to hold for now  - At home, not on previously prescribed valsartan and metoprolol/carvedilol due to drops in BP per family  - Resume antihypertensives for GDMT if able to tolerate pending repeat TTE  - Repeat TTE w/ EF of 51%, no pericardial effusion   - C/w home Farxiga.    Anemia  - Monitor H/H closely  - Iron, B12, Folate, Ferritin, TIBC --> Noted. Started on Folic acid   - Maintain Active T/S   - Transfuse for Hgb < 8.0 in view of NICM    GB Pneumobilia  - CT Chest with Pneumobilia/ Gas within GB  - Serial Abd examinations  - CT A/P w/ IV contrast decreased pneumobilia and air in the GB, prior sphincterotomy   - C/w Zosyn as per ID   - Pt asymptomatic LFTs WNL. Continue to monitor and trend  - Surgery eval appreciated --> No acute intervention at this time. Planned for outpatient follow up upon discharge for elective cholecystectomy     Multiple myeloma.   - Hx of MM (on weekly chemo/immunotherapy).   - Heme/Onc eval appreciated; F/u recs     Renal Lesion  - CT A/P w L lower pole kidney lesion --> patient will require outpatient MR. Discussed with daughter     Lung nodule   - CT Chest with multiple subcentimeter lung nodules  - Pt will require repeat CT chest in 3 months to re-assess. Discussed with Daughter and patient 12/06     Dilated Ascending Thoracic Aorta  - BP control  - Repeat CT in 3-6 months as an outpatient    Hypothyroidism.   - C/w home levothyroxine.    Prophylactic measure.   - DVT ppx: Lovenox  - Diet: Regular, low sodium         Discussed with Attending and ACP.  Discussed with family at the bedside.      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 Concern for AHRF, likely in setting of asthma exacerbation 2/2 RSV infection.      Acute respiratory failure with hypoxia.   - Ambulatory O2 sat dropped to 80s on ambulation in ER. Likely in setting of Asthma Exacerbation 2/2 RSV infection.  - CXR w/o focal consolidation   - CT Chest with PNA -GGO in B/L Lung fields, multiple nodular opacities, Dilated ascending aorta 4.3 CM, Pneumobilia and gas within the GB   - S/P Solumedrol 125mg IVP X 1 in ER for reported wheezing, transitioned to PO Prednisone 40 PO qd X 5 days w/ PPI for GI PPX   - C/w Duoneb q6h   - Incentive Spirometer   - Procal 0.21  - Check Sputum culture --> Pending. patient reports cough is not productive.   - Management of Asthma exacerbation as below   - Monitor respiratory status, wean supplemental O2 as tolerated (patient is not on O2 at baseline)  - Pulm evaluation appreciated; F/u recs      Acute asthma exacerbation.   - Hx of asthma, not on inhalers at home. Presented with worsening SOB/VELAZCO --> Exacerbated likely due to RSV    - S/P solumedrol 125mg IVP in ER, C/w prednisone 40mg PO x5 days  - C/w duonebs q6h   - Keep sats >90% with O2 PRN (currently 2LNC). Wean O2 as tolerated    RSV infection.   - Found to be RSV positive (12/4/23)  - C/w symptomatic management as above   - Monitor respiratory status  - Maintain isolation precautions    Pneumonia   - CT Chest as above with B/L GGO Opacities.  - Procal 0.21   - ABX as per ID --> On Zosyn for 5 day course. Transition to Levaquin on DC to complete course   - ID and pulm evaluations consulted and appreciated; F/u recs     NICM (nonischemic cardiomyopathy).   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 On admission, proBNP WNL and clinically euvolemic appearing.   - Not in active CHF exacerbation  - was started on ASA/statin last admission for "non-obstructive CAD" but [facility] (10/15/23) reported no disease; pt stated not taking theses medication at home, will continue to hold for now  - At home, not on previously prescribed valsartan and metoprolol/carvedilol due to drops in BP per family  - Resume antihypertensives for GDMT if able to tolerate pending repeat TTE  - Repeat TTE w/ EF of 51%, no pericardial effusion   - C/w home Farxiga.    Anemia  - Monitor H/H closely  - Iron, B12, Folate, Ferritin, TIBC --> Noted. Started on Folic acid   - Maintain Active T/S   - Transfuse for Hgb < 8.0 in view of NICM    GB Pneumobilia  - CT Chest with Pneumobilia/ Gas within GB  - Serial Abd examinations  - CT A/P w/ IV contrast decreased pneumobilia and air in the GB, prior sphincterotomy   - C/w Zosyn as per ID   - Pt asymptomatic LFTs WNL. Continue to monitor and trend  - Surgery eval appreciated --> No acute intervention at this time. Planned for outpatient follow up upon discharge for elective cholecystectomy     Multiple myeloma.   - Hx of MM (on weekly chemo/immunotherapy).   - Heme/Onc eval appreciated; F/u recs     Renal Lesion  - CT A/P w L lower pole kidney lesion --> patient will require outpatient MR. Discussed with daughter     Lung nodule   - CT Chest with multiple subcentimeter lung nodules  - Pt will require repeat CT chest in 3 months to re-assess. Discussed with Daughter and patient 12/06     Dilated Ascending Thoracic Aorta  - BP control  - Repeat CT in 3-6 months as an outpatient    Hypothyroidism.   - C/w home levothyroxine.    Prophylactic measure.   - DVT ppx: Lovenox  - Diet: Regular, low sodium         Discussed with Attending and ACP.  Discussed with family at the bedside.

## 2023-12-08 NOTE — PROGRESS NOTE ADULT - SUBJECTIVE AND OBJECTIVE BOX
Follow Up:  fever, RSV, pneumobilia and gallbladder gas    Interval History/ROS: pt afebrile, no SOB now on RA, no vomiting, just has abd distention        Allergies  sulfa drugs (Unknown)        ANTIMICROBIALS:  piperacillin/tazobactam IVPB.. 3.375 every 8 hours      OTHER MEDS:  acetaminophen     Tablet .. 650 milliGRAM(s) Oral every 6 hours PRN  albuterol    90 MICROgram(s) HFA Inhaler 2 Puff(s) Inhalation every 6 hours  albuterol/ipratropium for Nebulization 3 milliLiter(s) Nebulizer every 6 hours  aluminum hydroxide/magnesium hydroxide/simethicone Suspension 30 milliLiter(s) Oral every 4 hours PRN  benzonatate 100 milliGRAM(s) Oral every 8 hours  dapagliflozin 10 milliGRAM(s) Oral every 24 hours  dextrose 5%. 1000 milliLiter(s) IV Continuous <Continuous>  dextrose 5%. 1000 milliLiter(s) IV Continuous <Continuous>  dextrose 50% Injectable 25 Gram(s) IV Push once  dextrose 50% Injectable 12.5 Gram(s) IV Push once  dextrose 50% Injectable 25 Gram(s) IV Push once  dextrose Oral Gel 15 Gram(s) Oral once PRN  enoxaparin Injectable 40 milliGRAM(s) SubCutaneous every 24 hours  famotidine    Tablet 40 milliGRAM(s) Oral daily  folic acid 1 milliGRAM(s) Oral daily  glucagon  Injectable 1 milliGRAM(s) IntraMuscular once  guaiFENesin ER 1200 milliGRAM(s) Oral every 12 hours  insulin lispro (ADMELOG) corrective regimen sliding scale   SubCutaneous at bedtime  insulin lispro (ADMELOG) corrective regimen sliding scale   SubCutaneous three times a day before meals  levothyroxine 50 MICROGram(s) Oral daily  loratadine 10 milliGRAM(s) Oral daily  multivitamin 1 Tablet(s) Oral daily  pantoprazole    Tablet 40 milliGRAM(s) Oral before breakfast  predniSONE   Tablet 40 milliGRAM(s) Oral daily  senna 2 Tablet(s) Oral at bedtime      Vital Signs Last 24 Hrs  T(C): 36.4 (08 Dec 2023 04:39), Max: 37 (07 Dec 2023 12:09)  T(F): 97.5 (08 Dec 2023 04:39), Max: 98.6 (07 Dec 2023 12:09)  HR: 96 (08 Dec 2023 04:39) (88 - 98)  BP: 108/77 (08 Dec 2023 04:39) (108/77 - 116/83)  BP(mean): --  RR: 18 (08 Dec 2023 04:39) (18 - 18)  SpO2: 99% (08 Dec 2023 04:39) (93% - 99%)    Parameters below as of 08 Dec 2023 04:39  Patient On (Oxygen Delivery Method): nasal cannula  O2 Flow (L/min): 2      Physical Exam:  General:    NAD, non toxic  Respiratory:  comfortable on RA  abd:   soft,  not tender  :     no CVAT, no suprapubic tenderness, no valdovinos  Musculoskeletal : no joint swelling, no edema  Skin:    no rash  vascular: no phlebitis                          9.3    6.51  )-----------( 106      ( 07 Dec 2023 07:15 )             29.2       12-07    144  |  105  |  24<H>  ----------------------------<  86  4.0   |  23  |  1.16    Ca    9.0      07 Dec 2023 07:15  Phos  3.5     12-07  Mg     2.4     12-07    TPro  6.3  /  Alb  3.3  /  TBili  0.3  /  DBili  x   /  AST  12  /  ALT  18  /  AlkPhos  58  12-07      Urinalysis Basic - ( 07 Dec 2023 07:15 )    Color: x / Appearance: x / SG: x / pH: x  Gluc: 86 mg/dL / Ketone: x  / Bili: x / Urobili: x   Blood: x / Protein: x / Nitrite: x   Leuk Esterase: x / RBC: x / WBC x   Sq Epi: x / Non Sq Epi: x / Bacteria: x        MICROBIOLOGY:  v  Clean Catch Clean Catch (Midstream)  12-04-23   <10,000 CFU/mL Normal Urogenital Genet  --  --      .Blood Blood-Peripheral  12-04-23   No growth at 72 Hours  --  --      .Blood Blood-Peripheral  12-04-23   No growth at 72 Hours  --  --                RADIOLOGY:  Images independently visualized and reviewed personally, findings as below  < from: CT Abdomen and Pelvis w/ IV Cont (12.06.23 @ 20:34) >  IMPRESSION:  Decreased central pneumobilia and air in the gallbladder, which may be   related to prior sphincterotomy. Correlate for recent instrumentation.  An indeterminate tiny lesion arising from the lower pole of left kidney   for which further characterization with MRI is advised on outpatient   basis.    < end of copied text >

## 2023-12-08 NOTE — DISCHARGE NOTE PROVIDER - CARE PROVIDER_API CALL
Agustin Block  Hematology  [facility] [place] [address]  Phone: [phone]  Fax: [phone]  Follow Up Time: 2 weeks    Todd Rodríguez  Surgery  [address] [place] [id number]  Phone: [phone]  Fax: [phone]  Follow Up Time: 2 weeks   Agustin Block  Hematology  [facility] [place] [address]  Phone: [phone]  Fax: [phone]  Follow Up Time: 2 weeks    Todd Rodríguez  Surgery  [address] [place] [id number]  Phone: [phone]  Fax: [phone]  Follow Up Time: 2 weeks

## 2023-12-08 NOTE — PROGRESS NOTE ADULT - SUBJECTIVE AND OBJECTIVE BOX
Follow-up Pulm Progress Note    No new respiratory events overnight.  Denies SOB/CP.   cough slowly improving     Medications:  MEDICATIONS  (STANDING):  albuterol    90 MICROgram(s) HFA Inhaler 2 Puff(s) Inhalation every 6 hours  albuterol/ipratropium for Nebulization 3 milliLiter(s) Nebulizer every 6 hours  benzonatate 100 milliGRAM(s) Oral every 8 hours  dapagliflozin 10 milliGRAM(s) Oral every 24 hours  dextrose 5%. 1000 milliLiter(s) (50 mL/Hr) IV Continuous <Continuous>  dextrose 5%. 1000 milliLiter(s) (100 mL/Hr) IV Continuous <Continuous>  dextrose 50% Injectable 25 Gram(s) IV Push once  dextrose 50% Injectable 12.5 Gram(s) IV Push once  dextrose 50% Injectable 25 Gram(s) IV Push once  enoxaparin Injectable 40 milliGRAM(s) SubCutaneous every 24 hours  famotidine    Tablet 40 milliGRAM(s) Oral daily  folic acid 1 milliGRAM(s) Oral daily  glucagon  Injectable 1 milliGRAM(s) IntraMuscular once  insulin lispro (ADMELOG) corrective regimen sliding scale   SubCutaneous three times a day before meals  insulin lispro (ADMELOG) corrective regimen sliding scale   SubCutaneous at bedtime  levothyroxine 50 MICROGram(s) Oral daily  loratadine 10 milliGRAM(s) Oral daily  multivitamin 1 Tablet(s) Oral daily  pantoprazole    Tablet 40 milliGRAM(s) Oral before breakfast  piperacillin/tazobactam IVPB.. 3.375 Gram(s) IV Intermittent every 8 hours  predniSONE   Tablet 40 milliGRAM(s) Oral daily  senna 2 Tablet(s) Oral at bedtime    MEDICATIONS  (PRN):  acetaminophen     Tablet .. 650 milliGRAM(s) Oral every 6 hours PRN Temp greater or equal to 38C (100.4F), Mild Pain (1 - 3)  aluminum hydroxide/magnesium hydroxide/simethicone Suspension 30 milliLiter(s) Oral every 4 hours PRN Gas  dextrose Oral Gel 15 Gram(s) Oral once PRN Blood Glucose LESS THAN 70 milliGRAM(s)/deciliter  guaiFENesin Oral Liquid (Sugar-Free) 200 milliGRAM(s) Oral every 6 hours PRN Cough          Vital Signs Last 24 Hrs  T(C): 36.4 (08 Dec 2023 04:39), Max: 37 (07 Dec 2023 12:09)  T(F): 97.5 (08 Dec 2023 04:39), Max: 98.6 (07 Dec 2023 12:09)  HR: 96 (08 Dec 2023 04:39) (88 - 98)  BP: 108/77 (08 Dec 2023 04:39) (108/77 - 116/83)  BP(mean): --  RR: 18 (08 Dec 2023 04:39) (18 - 18)  SpO2: 99% (08 Dec 2023 04:39) (93% - 99%)    Parameters below as of 08 Dec 2023 04:39  Patient On (Oxygen Delivery Method): nasal cannula  O2 Flow (L/min): 2            12-07 @ 07:01  -  12-08 @ 07:00  --------------------------------------------------------  IN: 100 mL / OUT: 350 mL / NET: -250 mL          LABS:                        9.3    6.51  )-----------( 106      ( 07 Dec 2023 07:15 )             29.2     12-07    144  |  105  |  24<H>  ----------------------------<  86  4.0   |  23  |  1.16    Ca    9.0      07 Dec 2023 07:15  Phos  3.5     12-07  Mg     2.4     12-07    TPro  6.3  /  Alb  3.3  /  TBili  0.3  /  DBili  x   /  AST  12  /  ALT  18  /  AlkPhos  58  12-07          CAPILLARY BLOOD GLUCOSE      POCT Blood Glucose.: 110 mg/dL (08 Dec 2023 08:18)      Urinalysis Basic - ( 07 Dec 2023 07:15 )    Color: x / Appearance: x / SG: x / pH: x  Gluc: 86 mg/dL / Ketone: x  / Bili: x / Urobili: x   Blood: x / Protein: x / Nitrite: x   Leuk Esterase: x / RBC: x / WBC x   Sq Epi: x / Non Sq Epi: x / Bacteria: x      Procalcitonin, Serum: 0.21 ng/mL (12-07-23 @ 07:15)  Procalcitonin, Serum: 0.19 ng/mL (12-07-23 @ 07:15)          CULTURES:     Culture - Blood (collected 12-04-23 @ 17:40)  Source: .Blood Blood-Peripheral  Preliminary Report (12-07-23 @ 22:01):    No growth at 72 Hours    Culture - Blood (collected 12-04-23 @ 15:57)  Source: .Blood Blood-Peripheral  Preliminary Report (12-07-23 @ 22:01):    No growth at 72 Hours        Culture - Urine (collected 12-04-23 @ 22:26)  Source: Clean Catch Clean Catch (Midstream)  Final Report (12-06-23 @ 07:39):    <10,000 CFU/mL Normal Urogenital Genet            Physical Examination:  PULM: few scattered rhonchi with minimal expiratory wheeze, clears with cough   CVS: S1, S2 heard    RADIOLOGY REVIEWED  CT chest: < from: CT Chest No Cont (12.05.23 @ 20:02) >  FINDINGS:    AIRWAYS, LUNGS, PLEURA: Mild peribronchial thickening. No pleural   effusion.    Multifocal ground-glass opacities throughout all lung lobes are new   compared to 10/12/2023. Multiple additional subcentimeter nodular   opacities are unchanged; reference left apical 0.7 cm nodule (image 29,   series 3).    MEDIASTINUM: Normal heart size. No pericardial effusion. Dilated   ascending thoracic aorta measures 4.3 cm. Calcified mediastinal nodes.    IMAGED ABDOMEN: Pneumobilia and gas within the gallbladder, new compared   to 10/12/2023. Cholelithiasis.    SOFT TISSUES: Unremarkable.    BONES: No acute osseous abnormality.      IMPRESSION:.    Findings compatible with multifocal pneumonia, new compared to   10/12/2023. Recommend CT chest follow-up in 3 months to ensure clearing.    Indeterminate pulmonary nodules measuring up to 0.7 cm; recommend   attention on follow-up examination.    Pneumobilia and gas within the gallbladder, new compared to 10/12/2023;   infection should be considered as an etiology if there has been no   procedure performed (such as ERCP).    --- End of Report ---        < end of copied text >

## 2023-12-08 NOTE — DISCHARGE NOTE NURSING/CASE MANAGEMENT/SOCIAL WORK - NSDCPEFALRISK_GEN_ALL_CORE
For information on Fall & Injury Prevention, visit: [email or web address] OR  [email or web address] OR  https://www.cdc.gov/steadi/patient.html For information on Fall & Injury Prevention, visit: [email or web address] OR  [email or web address] OR  https://www.cdc.gov/steadi/patient.html

## 2023-12-08 NOTE — PROGRESS NOTE ADULT - PROBLEM SELECTOR PROBLEM 3
NICM (nonischemic cardiomyopathy)
RSV infection

## 2023-12-08 NOTE — PROGRESS NOTE ADULT - ASSESSMENT
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), developed cough about a week ago, was given azithro which he took for 3 days with no improvement and became febrile so came to the hospital, he has had VELAZCO for 1-2 months now worse, cough is also productive   here pt afebrile, tachypneic requiring O2, no WBC  RSV positive  chest CT: multifocal pneumonia, also new pneumobilia and gas within gall bladder  blood and urine cx negative    immunocompromised pt with MM on chemo with RSV pneumonia and asthma exacerbation, no WBC and the opacities are more patchy s/o viral pneumonia and procal is 0.2   pneumobilia and gas in gallbladder but normal LFTs and exam unremarkable, pt had cholelithiasis and sphincterotomy 3 years ago, which could explain the pneumobilia but the gas in gallbadder suggests a fistula, surgery did not recommend any interventions now  * supportive care for RSV  * surgery recommended outpatient f/u for cholecystectomy  * s/p 3 days of zosyn, low suspicion for bacterial pneumonia but pt is immunocompromised so will complete a 5 day course, if pt is ready for discharge switch to PO levo 500 qd to finish the course      The above assessment and plan was discussed with the primary team    Santa Candelario MD  contact on teams  After 5pm and on weekends call [phone]       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), developed cough about a week ago, was given azithro which he took for 3 days with no improvement and became febrile so came to the hospital, he has had VELAZCO for 1-2 months now worse, cough is also productive   here pt afebrile, tachypneic requiring O2, no WBC  RSV positive  chest CT: multifocal pneumonia, also new pneumobilia and gas within gall bladder  blood and urine cx negative    immunocompromised pt with MM on chemo with RSV pneumonia and asthma exacerbation, no WBC and the opacities are more patchy s/o viral pneumonia and procal is 0.2   pneumobilia and gas in gallbladder but normal LFTs and exam unremarkable, pt had cholelithiasis and sphincterotomy 3 years ago, which could explain the pneumobilia but the gas in gallbadder suggests a fistula, surgery did not recommend any interventions now  * supportive care for RSV  * surgery recommended outpatient f/u for cholecystectomy  * s/p 3 days of zosyn, low suspicion for bacterial pneumonia but pt is immunocompromised so will complete a 5 day course, if pt is ready for discharge switch to PO levo 500 qd to finish the course      The above assessment and plan was discussed with the primary team    Santa Candelario MD  contact on teams  After 5pm and on weekends call [phone]

## 2023-12-08 NOTE — PROGRESS NOTE ADULT - ASSESSMENT
66 yo M with a h/o MM currently on daratumumab and waiting to receive pomalyst is admitted for SOB due to asthma exacerbation.    MM  - previously on Dkd regimen c/b NICM/HFrEF   - On daratumumab now, waiting to start pomalyst  - no chemotherapy while inpatient    Asthma exacerbation/RSV+  Multifocal PNA   Pneumobilia   - appreciate pulmonary, ID and primary team recs   - c/w antibiotics and supportive care with nebs/steroids   - appreciate surgical eval- outpatient follow up for elective cholecystectomy     Anemia  - Hb baseline around 10.5 however fluctuates  - likely in the setting of MM and acute illness  - continue to monitor  - transfuse for Hb <7    Will follow       Patient can follow up with me in the office after discharge    68 yo M with a h/o MM currently on daratumumab and waiting to receive pomalyst is admitted for SOB due to asthma exacerbation, found to be RSV positive with multifocal pneumonia and pneumobilia.     MM  - previously on Dkd regimen c/b NICM/HFrEF   - On daratumumab now, waiting to start pomalyst  - no chemotherapy while inpatient    Asthma exacerbation/RSV+  Multifocal PNA   Pneumobilia   Pulm Nodules  - appreciate pulmonary, ID and primary team recs   - c/w antibiotics and supportive care with nebs/steroids   - appreciate surgical eval- outpatient follow up for elective cholecystectomy   - subcm pulm nodules on CT Chest- f/u in 3 months for surveillance     Anemia  - Hb baseline around 10.5 however fluctuates  - likely in the setting of MM and acute illness  - continue to monitor  - transfuse for Hb <7    Will follow       Patient can follow up with me in the office after discharge       Agustin Block MD   Hematology/Oncology  [facility]  [phone] (office) 66 yo M with a h/o MM currently on daratumumab and waiting to receive pomalyst is admitted for SOB due to asthma exacerbation, found to be RSV positive with multifocal pneumonia and pneumobilia.     MM  - previously on Dkd regimen c/b NICM/HFrEF   - On daratumumab now, waiting to start pomalyst  - no chemotherapy while inpatient    Asthma exacerbation/RSV+  Multifocal PNA   Pneumobilia   Pulm Nodules  - appreciate pulmonary, ID and primary team recs   - c/w antibiotics and supportive care with nebs/steroids   - appreciate surgical eval- outpatient follow up for elective cholecystectomy   - subcm pulm nodules on CT Chest- f/u in 3 months for surveillance     Anemia  - Hb baseline around 10.5 however fluctuates  - likely in the setting of MM and acute illness  - continue to monitor  - transfuse for Hb <7    Will follow       Patient can follow up with me in the office after discharge       Agustin Block MD   Hematology/Oncology  [facility]  [phone] (office)

## 2023-12-08 NOTE — DISCHARGE NOTE NURSING/CASE MANAGEMENT/SOCIAL WORK - PATIENT PORTAL LINK FT
You can access the FollowMyHealth Patient Portal offered by [facility] by registering at the following website: [email or web address]. By joining [facility]’s FollowMyHealth portal, you will also be able to view your health information using other applications (apps) compatible with our system. You can access the FollowMyHealth Patient Portal offered by [facility] by registering at the following website: [email or web address]. By joining [facility]’s FollowMyHealth portal, you will also be able to view your health information using other applications (apps) compatible with our system.

## 2023-12-08 NOTE — PROGRESS NOTE ADULT - SUBJECTIVE AND OBJECTIVE BOX
Ino Davison MD  Interventional Cardiology / Endovascular Specialist  [place] Office : [address] [place] [address] [place] [address]  Tel:   [place] Office : [address] [facility] [place] [address]  Tel: [phone]    Pt lying in bed in NAD denies   	  MEDICATIONS:  enoxaparin Injectable 40 milliGRAM(s) SubCutaneous every 24 hours    piperacillin/tazobactam IVPB.. 3.375 Gram(s) IV Intermittent every 8 hours    albuterol    90 MICROgram(s) HFA Inhaler 2 Puff(s) Inhalation every 6 hours  albuterol/ipratropium for Nebulization 3 milliLiter(s) Nebulizer every 6 hours  benzonatate 100 milliGRAM(s) Oral every 8 hours  guaiFENesin ER 1200 milliGRAM(s) Oral every 12 hours  guaiFENesin Oral Liquid (Sugar-Free) 200 milliGRAM(s) Oral every 6 hours PRN  loratadine 10 milliGRAM(s) Oral daily    acetaminophen     Tablet .. 650 milliGRAM(s) Oral every 6 hours PRN    aluminum hydroxide/magnesium hydroxide/simethicone Suspension 30 milliLiter(s) Oral every 4 hours PRN  famotidine    Tablet 40 milliGRAM(s) Oral daily  pantoprazole    Tablet 40 milliGRAM(s) Oral before breakfast  senna 2 Tablet(s) Oral at bedtime  simethicone 80 milliGRAM(s) Chew every 12 hours PRN    dapagliflozin 10 milliGRAM(s) Oral every 24 hours  dextrose 50% Injectable 25 Gram(s) IV Push once  dextrose 50% Injectable 25 Gram(s) IV Push once  dextrose 50% Injectable 12.5 Gram(s) IV Push once  dextrose Oral Gel 15 Gram(s) Oral once PRN  glucagon  Injectable 1 milliGRAM(s) IntraMuscular once  insulin lispro (ADMELOG) corrective regimen sliding scale   SubCutaneous three times a day before meals  insulin lispro (ADMELOG) corrective regimen sliding scale   SubCutaneous at bedtime  levothyroxine 50 MICROGram(s) Oral daily  predniSONE   Tablet 40 milliGRAM(s) Oral daily    dextrose 5%. 1000 milliLiter(s) IV Continuous <Continuous>  dextrose 5%. 1000 milliLiter(s) IV Continuous <Continuous>  folic acid 1 milliGRAM(s) Oral daily  multivitamin 1 Tablet(s) Oral daily      PAST MEDICAL/SURGICAL HISTORY  PAST MEDICAL & SURGICAL HISTORY:  Hypothyroidism      Multiple myeloma      Asthma      No significant past surgical history          SOCIAL HISTORY: Substance Use (street drugs): ( x ) never used  (  ) other:    FAMILY HISTORY:  FHx: leukemia        REVIEW OF SYSTEMS:  CONSTITUTIONAL: No fever, weight loss, or fatigue  EYES: No eye pain, visual disturbances, or discharge  ENMT:  No difficulty hearing, tinnitus, vertigo; No sinus or throat pain  BREASTS: No pain, masses, or nipple discharge  GASTROINTESTINAL: No abdominal or epigastric pain. No nausea, vomiting, or hematemesis; No diarrhea or constipation. No melena or hematochezia.  GENITOURINARY: No dysuria, frequency, hematuria, or incontinence  NEUROLOGICAL: No headaches, memory loss, loss of strength, numbness, or tremors  ENDOCRINE: No heat or cold intolerance; No hair loss  MUSCULOSKELETAL: No joint pain or swelling; No muscle, back, or extremity pain  PSYCHIATRIC: No depression, anxiety, mood swings, or difficulty sleeping  HEME/LYMPH: No easy bruising, or bleeding gums  All others negative    PHYSICAL EXAM:  T(C): 36.5 (12-08-23 @ 19:27), Max: 36.8 (12-08-23 @ 12:01)  HR: 93 (12-08-23 @ 19:27) (91 - 96)  BP: 123/82 (12-08-23 @ 19:27) (108/77 - 126/85)  RR: 18 (12-08-23 @ 19:27) (18 - 19)  SpO2: 94% (12-08-23 @ 19:27) (94% - 99%)  Wt(kg): --  I&O's Summary    07 Dec 2023 07:01  -  08 Dec 2023 07:00  --------------------------------------------------------  IN: 100 mL / OUT: 350 mL / NET: -250 mL    08 Dec 2023 07:01  -  08 Dec 2023 22:50  --------------------------------------------------------  IN: 0 mL / OUT: 250 mL / NET: -250 mL    GENERAL: NAD febrile   EYES: conjunctiva and sclera clear  ENMT: No tonsillar erythema, exudates, or enlargement   Cardiovascular: Normal S1 S2, No JVD, No murmurs  Respiratory: B/L basal ronchi   Gastrointestinal:  Soft, Non-tender, + BS	  Extremities: No edema                          9.3    6.51  )-----------( 106      ( 07 Dec 2023 07:15 )             29.2     12-07    144  |  105  |  24<H>  ----------------------------<  86  4.0   |  23  |  1.16    Ca    9.0      07 Dec 2023 07:15  Phos  3.5     12-07  Mg     2.4     12-07    TPro  6.3  /  Alb  3.3  /  TBili  0.3  /  DBili  x   /  AST  12  /  ALT  18  /  AlkPhos  58  12-07    proBNP:   Lipid Profile:   HgA1c:   TSH:     Consultant(s) Notes Reviewed:  [x ] YES  [ ] NO    Care Discussed with Consultants/Other Providers [ x] YES  [ ] NO    Imaging Personally Reviewed independently:  [x] YES  [ ] NO    All labs, radiologic studies, vitals, orders and medications list reviewed. Patient is seen and examined at bedside. Case discussed with medical team.                 Ino Davison MD  Interventional Cardiology / Endovascular Specialist  [place] Office : [phone] [place] [address] [place] [address]  Tel:   [place] Office : [address] [facility] [place] [address]  Tel: [phone]    Pt lying in bed in NAD denies   	  MEDICATIONS:  enoxaparin Injectable 40 milliGRAM(s) SubCutaneous every 24 hours    piperacillin/tazobactam IVPB.. 3.375 Gram(s) IV Intermittent every 8 hours    albuterol    90 MICROgram(s) HFA Inhaler 2 Puff(s) Inhalation every 6 hours  albuterol/ipratropium for Nebulization 3 milliLiter(s) Nebulizer every 6 hours  benzonatate 100 milliGRAM(s) Oral every 8 hours  guaiFENesin ER 1200 milliGRAM(s) Oral every 12 hours  guaiFENesin Oral Liquid (Sugar-Free) 200 milliGRAM(s) Oral every 6 hours PRN  loratadine 10 milliGRAM(s) Oral daily    acetaminophen     Tablet .. 650 milliGRAM(s) Oral every 6 hours PRN    aluminum hydroxide/magnesium hydroxide/simethicone Suspension 30 milliLiter(s) Oral every 4 hours PRN  famotidine    Tablet 40 milliGRAM(s) Oral daily  pantoprazole    Tablet 40 milliGRAM(s) Oral before breakfast  senna 2 Tablet(s) Oral at bedtime  simethicone 80 milliGRAM(s) Chew every 12 hours PRN    dapagliflozin 10 milliGRAM(s) Oral every 24 hours  dextrose 50% Injectable 25 Gram(s) IV Push once  dextrose 50% Injectable 25 Gram(s) IV Push once  dextrose 50% Injectable 12.5 Gram(s) IV Push once  dextrose Oral Gel 15 Gram(s) Oral once PRN  glucagon  Injectable 1 milliGRAM(s) IntraMuscular once  insulin lispro (ADMELOG) corrective regimen sliding scale   SubCutaneous three times a day before meals  insulin lispro (ADMELOG) corrective regimen sliding scale   SubCutaneous at bedtime  levothyroxine 50 MICROGram(s) Oral daily  predniSONE   Tablet 40 milliGRAM(s) Oral daily    dextrose 5%. 1000 milliLiter(s) IV Continuous <Continuous>  dextrose 5%. 1000 milliLiter(s) IV Continuous <Continuous>  folic acid 1 milliGRAM(s) Oral daily  multivitamin 1 Tablet(s) Oral daily      PAST MEDICAL/SURGICAL HISTORY  PAST MEDICAL & SURGICAL HISTORY:  Hypothyroidism      Multiple myeloma      Asthma      No significant past surgical history          SOCIAL HISTORY: Substance Use (street drugs): ( x ) never used  (  ) other:    FAMILY HISTORY:  FHx: leukemia        REVIEW OF SYSTEMS:  CONSTITUTIONAL: No fever, weight loss, or fatigue  EYES: No eye pain, visual disturbances, or discharge  ENMT:  No difficulty hearing, tinnitus, vertigo; No sinus or throat pain  BREASTS: No pain, masses, or nipple discharge  GASTROINTESTINAL: No abdominal or epigastric pain. No nausea, vomiting, or hematemesis; No diarrhea or constipation. No melena or hematochezia.  GENITOURINARY: No dysuria, frequency, hematuria, or incontinence  NEUROLOGICAL: No headaches, memory loss, loss of strength, numbness, or tremors  ENDOCRINE: No heat or cold intolerance; No hair loss  MUSCULOSKELETAL: No joint pain or swelling; No muscle, back, or extremity pain  PSYCHIATRIC: No depression, anxiety, mood swings, or difficulty sleeping  HEME/LYMPH: No easy bruising, or bleeding gums  All others negative    PHYSICAL EXAM:  T(C): 36.5 (12-08-23 @ 19:27), Max: 36.8 (12-08-23 @ 12:01)  HR: 93 (12-08-23 @ 19:27) (91 - 96)  BP: 123/82 (12-08-23 @ 19:27) (108/77 - 126/85)  RR: 18 (12-08-23 @ 19:27) (18 - 19)  SpO2: 94% (12-08-23 @ 19:27) (94% - 99%)  Wt(kg): --  I&O's Summary    07 Dec 2023 07:01  -  08 Dec 2023 07:00  --------------------------------------------------------  IN: 100 mL / OUT: 350 mL / NET: -250 mL    08 Dec 2023 07:01  -  08 Dec 2023 22:50  --------------------------------------------------------  IN: 0 mL / OUT: 250 mL / NET: -250 mL    GENERAL: NAD febrile   EYES: conjunctiva and sclera clear  ENMT: No tonsillar erythema, exudates, or enlargement   Cardiovascular: Normal S1 S2, No JVD, No murmurs  Respiratory: B/L basal ronchi   Gastrointestinal:  Soft, Non-tender, + BS	  Extremities: No edema                          9.3    6.51  )-----------( 106      ( 07 Dec 2023 07:15 )             29.2     12-07    144  |  105  |  24<H>  ----------------------------<  86  4.0   |  23  |  1.16    Ca    9.0      07 Dec 2023 07:15  Phos  3.5     12-07  Mg     2.4     12-07    TPro  6.3  /  Alb  3.3  /  TBili  0.3  /  DBili  x   /  AST  12  /  ALT  18  /  AlkPhos  58  12-07    proBNP:   Lipid Profile:   HgA1c:   TSH:     Consultant(s) Notes Reviewed:  [x ] YES  [ ] NO    Care Discussed with Consultants/Other Providers [ x] YES  [ ] NO    Imaging Personally Reviewed independently:  [x] YES  [ ] NO    All labs, radiologic studies, vitals, orders and medications list reviewed. Patient is seen and examined at bedside. Case discussed with medical team.

## 2023-12-08 NOTE — DISCHARGE NOTE PROVIDER - HOSPITAL COURSE
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 Concern for AHRF, likely in setting of asthma exacerbation 2/2 RSV infection.      Acute respiratory failure with hypoxia.   - Ambulatory O2 sat dropped to 80s on ambulation in ER. Likely in setting of Asthma Exacerbation 2/2 RSV infection.  - CXR w/o focal consolidation   - CT Chest with PNA -GGO in B/L Lung fields, multiple nodular opacities, Dilated ascending aorta 4.3 CM, Pneumobilia and gas within the GB   - S/P Solumedrol 125mg IVP X 1 in ER for reported wheezing, transitioned to PO Prednisone 40 PO qd X 5 days w/ PPI for GI PPX   - C/w Duoneb q6h   - Incentive Spirometer   - Procal 0.21  - Management of Asthma exacerbation as below   - Monitor respiratory status, wean supplemental O2 as tolerated (patient is not on O2 at baseline)  - Pulm evaluation appreciated; F/u recs      Acute asthma exacerbation.   - Hx of asthma, not on inhalers at home. Presented with worsening SOB/VELAZCO --> Exacerbated likely due to RSV    - S/P solumedrol 125mg IVP in ER, C/w prednisone 40mg PO x5 days  - C/w duonebs q6h   - Keep sats >90% with O2 PRN (currently 2LNC). Wean O2 as tolerated    RSV infection.   - Found to be RSV positive (12/4/23)  - C/w symptomatic management as above   - Monitor respiratory status  - Maintain isolation precautions    Pneumonia   - CT Chest as above with B/L GGO Opacities.  - Procal 0.21   - ABX as per ID --> On Zosyn for 5 day course. Transition to Levaquin on DC to complete course   - ID and pulm evaluations consulted and appreciated; F/u recs     NICM (nonischemic cardiomyopathy).   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 On admission, proBNP WNL and clinically euvolemic appearing.   - Not in active CHF exacerbation  - was started on ASA/statin last admission for "non-obstructive CAD" but [facility] (10/15/23) reported no disease; pt stated not taking theses medication at home, will continue to hold for now  - At home, not on previously prescribed valsartan and metoprolol/carvedilol due to drops in BP per family  - Resume antihypertensives for GDMT if able to tolerate pending repeat TTE  - Repeat TTE w/ EF of 51%, no pericardial effusion   - C/w home Farxiga.    Anemia  - Monitor H/H closely  - Iron, B12, Folate, Ferritin, TIBC --> Noted. Started on Folic acid   - Maintain Active T/S   - Transfuse for Hgb < 8.0 in view of NICM    GB Pneumobilia  - CT Chest with Pneumobilia/ Gas within GB  - Serial Abd examinations  - CT A/P w/ IV contrast decreased pneumobilia and air in the GB, prior sphincterotomy   - C/w Zosyn as per ID   - Pt asymptomatic LFTs WNL. Continue to monitor and trend  - Surgery eval appreciated --> No acute intervention at this time. Planned for outpatient follow up upon discharge for elective cholecystectomy     Multiple myeloma.   - Hx of MM (on weekly chemo/immunotherapy).   - Heme/Onc eval appreciated; F/u recs     Renal Lesion  - CT A/P w L lower pole kidney lesion --> patient will require outpatient MR. Discussed with daughter     Lung nodule   - CT Chest with multiple subcentimeter lung nodules  - Pt will require repeat CT chest in 3 months to re-assess. Discussed with Daughter and patient 12/06     Dilated Ascending Thoracic Aorta  - BP control  - Repeat CT in 3-6 months as an outpatient    Hypothyroidism.   - C/w home levothyroxine.    Prophylactic measure.   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 Concern for AHRF, likely in setting of asthma exacerbation 2/2 RSV infection.      Acute respiratory failure with hypoxia.   - Ambulatory O2 sat dropped to 80s on ambulation in ER. Likely in setting of Asthma Exacerbation 2/2 RSV infection.  - CXR w/o focal consolidation   - CT Chest with PNA -GGO in B/L Lung fields, multiple nodular opacities, Dilated ascending aorta 4.3 CM, Pneumobilia and gas within the GB   - S/P Solumedrol 125mg IVP X 1 in ER for reported wheezing, transitioned to PO Prednisone 40 PO qd X 5 days w/ PPI for GI PPX   - C/w Duoneb q6h   - Incentive Spirometer   - Procal 0.21  - Management of Asthma exacerbation as below   - Monitor respiratory status, wean supplemental O2 as tolerated (patient is not on O2 at baseline)  - Pulm evaluation appreciated; F/u recs      Acute asthma exacerbation.   - Hx of asthma, not on inhalers at home. Presented with worsening SOB/VELAZCO --> Exacerbated likely due to RSV    - S/P solumedrol 125mg IVP in ER, C/w prednisone 40mg PO x5 days  - C/w duonebs q6h   - Keep sats >90% with O2 PRN (currently 2LNC). Wean O2 as tolerated    RSV infection.   - Found to be RSV positive (12/4/23)  - C/w symptomatic management as above   - Monitor respiratory status  - Maintain isolation precautions    Pneumonia   - CT Chest as above with B/L GGO Opacities.  - Procal 0.21   - ABX as per ID --> On Zosyn for 5 day course. Transition to Levaquin on DC to complete course   - ID and pulm evaluations consulted and appreciated; F/u recs     NICM (nonischemic cardiomyopathy).   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 On admission, proBNP WNL and clinically euvolemic appearing.   - Not in active CHF exacerbation  - was started on ASA/statin last admission for "non-obstructive CAD" but [facility] (10/15/23) reported no disease; pt stated not taking theses medication at home, will continue to hold for now  - At home, not on previously prescribed valsartan and metoprolol/carvedilol due to drops in BP per family  - Resume antihypertensives for GDMT if able to tolerate pending repeat TTE  - Repeat TTE w/ EF of 51%, no pericardial effusion   - C/w home Farxiga.    Anemia  - Monitor H/H closely  - Iron, B12, Folate, Ferritin, TIBC --> Noted. Started on Folic acid   - Maintain Active T/S   - Transfuse for Hgb < 8.0 in view of NICM    GB Pneumobilia  - CT Chest with Pneumobilia/ Gas within GB  - Serial Abd examinations  - CT A/P w/ IV contrast decreased pneumobilia and air in the GB, prior sphincterotomy   - C/w Zosyn as per ID   - Pt asymptomatic LFTs WNL. Continue to monitor and trend  - Surgery eval appreciated --> No acute intervention at this time. Planned for outpatient follow up upon discharge for elective cholecystectomy     Multiple myeloma.   - Hx of MM (on weekly chemo/immunotherapy).   - Heme/Onc eval appreciated; F/u recs     Renal Lesion  - CT A/P w L lower pole kidney lesion --> patient will require outpatient MR. Discussed with daughter     Lung nodule   - CT Chest with multiple subcentimeter lung nodules  - Pt will require repeat CT chest in 3 months to re-assess. Discussed with Daughter and patient 12/06     Dilated Ascending Thoracic Aorta  - BP control  - Repeat CT in 3-6 months as an outpatient    Hypothyroidism.   - C/w home levothyroxine.    Prophylactic measure.   - DVT ppx: Lovenox  - Diet: Regular, low sodium

## 2023-12-08 NOTE — CONSULT NOTE ADULT - ATTENDING COMMENTS
seen and examined 12-08-23 @ 0635    afeb  soft / NT / ND  no jaundice    WBC = 6  LFTs - wnl    CT abd/pelvis w/ contrast (12/6/2023) - pneumobilia with air in gallbladder lumen. contracted gallbladder with radiopaque stones in the lumen. CBD = 8 mm.      cholelithiasis with chronic cholecystitis  pneumobilia  -no evidence of cholecystenteric fistula or emphysematous cholecystitis  -The pneumobilia is secondary to a sphincterotomy and is not a pathologic issue.  -He should consider cholecystectomy to prevent recurrent gallstone-related disease. He can f/u in my office or with another general surgeon if he would like to discuss this surgery. my office number is [phone].  -reconsult acute care surgery PRN      I reviewed his labs and radiologic images. seen and examined 12-08-23 @ 0635    afeb  soft / NT / ND  no jaundice    WBC = 6  LFTs - wnl    CT abd/pelvis w/ contrast (12/6/2023) - pneumobilia with air in gallbladder lumen. contracted gallbladder with radiopaque stones in the lumen. CBD = 8 mm.      cholelithiasis with chronic cholecystitis  pneumobilia  -no evidence of cholecystenteric fistula or emphysematous cholecystitis  -The pneumobilia is secondary to a sphincterotomy and is not a pathologic issue.  -He should consider cholecystectomy to prevent recurrent gallstone-related disease. He can f/u in my office or with another general surgeon if he would like to discuss this surgery. my office number is [phone].  -reconsult acute care surgery PRN      I reviewed his labs and radiologic images.

## 2023-12-08 NOTE — PROGRESS NOTE ADULT - SUBJECTIVE AND OBJECTIVE BOX
non-verbal indicators absent (Rating = 0) Name of Patient : CARMEN BERNSTEIN  MRN: [id number]  Date of visit: 12-08-23 @ 11:50      Subjective: Patient seen and examined. No new events except as noted.   Patient seen earlier this AM. Family at the bedside.  Patient reports feeling somewhat better. States that his cough is still present but overall improving. Denies shortness of breath, dyspnea, chest pain or palpitations.  Reports some gas discomfort. States that he is moving his bowels.    REVIEW OF SYSTEMS:    CONSTITUTIONAL: Generalized weakness   EYES/ENT: No visual changes;  No vertigo or throat pain   NECK: No pain or stiffness  RESPIRATORY: + Cough - improving;  No wheezing, hemoptysis; No shortness of breath  CARDIOVASCULAR: No chest pain or palpitations  GASTROINTESTINAL: + Gas; No abdominal or epigastric pain. No nausea, vomiting, or hematemesis; No diarrhea or constipation. No melena or hematochezia.  GENITOURINARY: No dysuria, frequency or hematuria  NEUROLOGICAL: No numbness or weakness  SKIN: No itching, burning, rashes, or lesions   All other review of systems is negative unless indicated above.    MEDICATIONS:  MEDICATIONS  (STANDING):  albuterol    90 MICROgram(s) HFA Inhaler 2 Puff(s) Inhalation every 6 hours  albuterol/ipratropium for Nebulization 3 milliLiter(s) Nebulizer every 6 hours  benzonatate 100 milliGRAM(s) Oral every 8 hours  dapagliflozin 10 milliGRAM(s) Oral every 24 hours  dextrose 5%. 1000 milliLiter(s) (50 mL/Hr) IV Continuous <Continuous>  dextrose 5%. 1000 milliLiter(s) (100 mL/Hr) IV Continuous <Continuous>  dextrose 50% Injectable 25 Gram(s) IV Push once  dextrose 50% Injectable 12.5 Gram(s) IV Push once  dextrose 50% Injectable 25 Gram(s) IV Push once  enoxaparin Injectable 40 milliGRAM(s) SubCutaneous every 24 hours  famotidine    Tablet 40 milliGRAM(s) Oral daily  folic acid 1 milliGRAM(s) Oral daily  glucagon  Injectable 1 milliGRAM(s) IntraMuscular once  guaiFENesin ER 1200 milliGRAM(s) Oral every 12 hours  insulin lispro (ADMELOG) corrective regimen sliding scale   SubCutaneous at bedtime  insulin lispro (ADMELOG) corrective regimen sliding scale   SubCutaneous three times a day before meals  levothyroxine 50 MICROGram(s) Oral daily  loratadine 10 milliGRAM(s) Oral daily  multivitamin 1 Tablet(s) Oral daily  pantoprazole    Tablet 40 milliGRAM(s) Oral before breakfast  piperacillin/tazobactam IVPB.. 3.375 Gram(s) IV Intermittent every 8 hours  predniSONE   Tablet 40 milliGRAM(s) Oral daily  senna 2 Tablet(s) Oral at bedtime      PHYSICAL EXAM:  T(C): 36.4 (12-08-23 @ 04:39), Max: 37 (12-07-23 @ 12:09)  HR: 96 (12-08-23 @ 04:39) (88 - 98)  BP: 108/77 (12-08-23 @ 04:39) (108/77 - 116/83)  RR: 18 (12-08-23 @ 04:39) (18 - 18)  SpO2: 99% (12-08-23 @ 04:39) (93% - 99%)  Wt(kg): --  I&O's Summary    07 Dec 2023 07:01  -  08 Dec 2023 07:00  --------------------------------------------------------  IN: 100 mL / OUT: 350 mL / NET: -250 mL          Appearance: Awake, sitting up in bed 	  HEENT: Eyes are open   Lymphatic: No lymphadenopathy   Cardiovascular: Normal S1 S2, no JVD  Respiratory: normal effort , clear  Gastrointestinal:  Soft, Non-tender to palpitation throughout abdomen or RUQ; No Rebound, No guarding, Negative Herr's sign   Skin: No rashes,  warm to touch  Psychiatry:  Mood & affect appropriate  Musculoskeletal: No edema          12-07-23 @ 07:01  -  12-08-23 @ 07:00  --------------------------------------------------------  IN: 100 mL / OUT: 350 mL / NET: -250 mL                              9.3    6.51  )-----------( 106      ( 07 Dec 2023 07:15 )             29.2               12-07    144  |  105  |  24<H>  ----------------------------<  86  4.0   |  23  |  1.16    Ca    9.0      07 Dec 2023 07:15  Phos  3.5     12-07  Mg     2.4     12-07    TPro  6.3  /  Alb  3.3  /  TBili  0.3  /  DBili  x   /  AST  12  /  ALT  18  /  AlkPhos  58  12-07                       Urinalysis Basic - ( 07 Dec 2023 07:15 )    Color: x / Appearance: x / SG: x / pH: x  Gluc: 86 mg/dL / Ketone: x  / Bili: x / Urobili: x   Blood: x / Protein: x / Nitrite: x   Leuk Esterase: x / RBC: x / WBC x   Sq Epi: x / Non Sq Epi: x / Bacteria: x          Culture - Blood (12.04.23 @ 17:40)   Specimen Source: .Blood Blood-Peripheral  Culture Results: No growth at 72 Hours    Culture - Blood (12.04.23 @ 15:57)   Specimen Source: .Blood Blood-Peripheral  Culture Results: No growth at 72 Hours    < from: CT Abdomen and Pelvis w/ IV Cont (12.06.23 @ 20:34) >      INTERPRETATION:  CLINICAL INFORMATION: Pneumobilia seen on prior CT.    COMPARISON: CT chest 12/5/2023, CTA chest 10/12/2023    CONTRAST/COMPLICATIONS:  IV Contrast: Omnipaque 350  90 cc administered   10 cc discarded  Oral Contrast: NONE  Complications: None reported at time of study completion    PROCEDURE:  CT of the Abdomen and Pelvis was performed.  Sagittal and coronal reformats were performed.    FINDINGS:  LOWER CHEST: Bibasilar groundglass opacities, better evaluated on recent   chest CT.    LIVER: Within normal limits.  BILE DUCTS: Trace central pneumobilia, which has decreased since prior   study. No intra or extrahepatic biliary ductal dilatation. No abnormal   enhancement of the bile ducts.  GALLBLADDER: Cholelithiasis. Decreased intraluminal air.  SPLEEN: Within normal limits.  PANCREAS: Within normal limits.  ADRENALS: Within normal limits.  KIDNEYS/URETERS: No hydronephrosis. Left renal cyst. Indeterminate 4 mm   lesion arising from the left lower pole (3, 69).    BLADDER: Within normal limits.  REPRODUCTIVE ORGANS: Prostate within normal limits.    BOWEL: No bowel obstruction. Appendix is normal. Small hiatal hernia.  PERITONEUM: No ascites.  VESSELS: Within normal limits.  RETROPERITONEUM/LYMPH NODES: No lymphadenopathy.  ABDOMINAL WALL: Within normal limits.  BONES: Within normal limits.    IMPRESSION:  Decreased central pneumobilia and air in the gallbladder, which may be   related to prior sphincterotomy. Correlate for recent instrumentation.  An indeterminate tiny lesion arising from the lower pole of left kidney   for which further characterization with MRI is advised on outpatient   basis.    --- End of Report ---          ZOYA ODOM MD; Resident Radiologist  This document has been electronically signed.  DEMETRIO MARTINEZ MD; Attending Radiologist  This document has been electronically signed. Dec  7 2023 10:21AM    < end of copied text >      < from: TTE Limited W or WO Ultrasound Enhancing Agent (12.07.23 @ 12:59) >  CONCLUSIONS:      1. Left ventricular systolic function is normal with an ejection fraction of 51 % by Frances's method of disks.   2. No pericardial effusion seen.  3. Compared to the transthoracic echocardiogram performed on 10/13/2023 Low nornal EF/.   4. There is no evidence of a left ventricular thrombus.    < end of copied text >   Name of Patient : CARMEN BERNSTEIN  MRN: [id number]  Date of visit: 12-08-23 @ 11:50      Subjective: Patient seen and examined. No new events except as noted.   Patient seen earlier this AM. Family at the bedside.  Patient reports feeling somewhat better. States that his cough is still present but overall improving. Denies shortness of breath, dyspnea, chest pain or palpitations.  Reports some gas discomfort. States that he is moving his bowels.    REVIEW OF SYSTEMS:    CONSTITUTIONAL: Generalized weakness   EYES/ENT: No visual changes;  No vertigo or throat pain   NECK: No pain or stiffness  RESPIRATORY: + Cough - improving;  No wheezing, hemoptysis; No shortness of breath  CARDIOVASCULAR: No chest pain or palpitations  GASTROINTESTINAL: + Gas; No abdominal or epigastric pain. No nausea, vomiting, or hematemesis; No diarrhea or constipation. No melena or hematochezia.  GENITOURINARY: No dysuria, frequency or hematuria  NEUROLOGICAL: No numbness or weakness  SKIN: No itching, burning, rashes, or lesions   All other review of systems is negative unless indicated above.    MEDICATIONS:  MEDICATIONS  (STANDING):  albuterol    90 MICROgram(s) HFA Inhaler 2 Puff(s) Inhalation every 6 hours  albuterol/ipratropium for Nebulization 3 milliLiter(s) Nebulizer every 6 hours  benzonatate 100 milliGRAM(s) Oral every 8 hours  dapagliflozin 10 milliGRAM(s) Oral every 24 hours  dextrose 5%. 1000 milliLiter(s) (50 mL/Hr) IV Continuous <Continuous>  dextrose 5%. 1000 milliLiter(s) (100 mL/Hr) IV Continuous <Continuous>  dextrose 50% Injectable 25 Gram(s) IV Push once  dextrose 50% Injectable 12.5 Gram(s) IV Push once  dextrose 50% Injectable 25 Gram(s) IV Push once  enoxaparin Injectable 40 milliGRAM(s) SubCutaneous every 24 hours  famotidine    Tablet 40 milliGRAM(s) Oral daily  folic acid 1 milliGRAM(s) Oral daily  glucagon  Injectable 1 milliGRAM(s) IntraMuscular once  guaiFENesin ER 1200 milliGRAM(s) Oral every 12 hours  insulin lispro (ADMELOG) corrective regimen sliding scale   SubCutaneous at bedtime  insulin lispro (ADMELOG) corrective regimen sliding scale   SubCutaneous three times a day before meals  levothyroxine 50 MICROGram(s) Oral daily  loratadine 10 milliGRAM(s) Oral daily  multivitamin 1 Tablet(s) Oral daily  pantoprazole    Tablet 40 milliGRAM(s) Oral before breakfast  piperacillin/tazobactam IVPB.. 3.375 Gram(s) IV Intermittent every 8 hours  predniSONE   Tablet 40 milliGRAM(s) Oral daily  senna 2 Tablet(s) Oral at bedtime      PHYSICAL EXAM:  T(C): 36.4 (12-08-23 @ 04:39), Max: 37 (12-07-23 @ 12:09)  HR: 96 (12-08-23 @ 04:39) (88 - 98)  BP: 108/77 (12-08-23 @ 04:39) (108/77 - 116/83)  RR: 18 (12-08-23 @ 04:39) (18 - 18)  SpO2: 99% (12-08-23 @ 04:39) (93% - 99%)  Wt(kg): --  I&O's Summary    07 Dec 2023 07:01  -  08 Dec 2023 07:00  --------------------------------------------------------  IN: 100 mL / OUT: 350 mL / NET: -250 mL          Appearance: Awake, sitting up in bed 	  HEENT: Eyes are open   Lymphatic: No lymphadenopathy   Cardiovascular: Normal S1 S2, no JVD  Respiratory: normal effort , clear  Gastrointestinal:  Soft, Non-tender to palpitation throughout abdomen or RUQ; No Rebound, No guarding, Negative Herr's sign   Skin: No rashes,  warm to touch  Psychiatry:  Mood & affect appropriate  Musculoskeletal: No edema          12-07-23 @ 07:01  -  12-08-23 @ 07:00  --------------------------------------------------------  IN: 100 mL / OUT: 350 mL / NET: -250 mL                              9.3    6.51  )-----------( 106      ( 07 Dec 2023 07:15 )             29.2               12-07    144  |  105  |  24<H>  ----------------------------<  86  4.0   |  23  |  1.16    Ca    9.0      07 Dec 2023 07:15  Phos  3.5     12-07  Mg     2.4     12-07    TPro  6.3  /  Alb  3.3  /  TBili  0.3  /  DBili  x   /  AST  12  /  ALT  18  /  AlkPhos  58  12-07                       Urinalysis Basic - ( 07 Dec 2023 07:15 )    Color: x / Appearance: x / SG: x / pH: x  Gluc: 86 mg/dL / Ketone: x  / Bili: x / Urobili: x   Blood: x / Protein: x / Nitrite: x   Leuk Esterase: x / RBC: x / WBC x   Sq Epi: x / Non Sq Epi: x / Bacteria: x          Culture - Blood (12.04.23 @ 17:40)   Specimen Source: .Blood Blood-Peripheral  Culture Results: No growth at 72 Hours    Culture - Blood (12.04.23 @ 15:57)   Specimen Source: .Blood Blood-Peripheral  Culture Results: No growth at 72 Hours    < from: CT Abdomen and Pelvis w/ IV Cont (12.06.23 @ 20:34) >      INTERPRETATION:  CLINICAL INFORMATION: Pneumobilia seen on prior CT.    COMPARISON: CT chest 12/5/2023, CTA chest 10/12/2023    CONTRAST/COMPLICATIONS:  IV Contrast: Omnipaque 350  90 cc administered   10 cc discarded  Oral Contrast: NONE  Complications: None reported at time of study completion    PROCEDURE:  CT of the Abdomen and Pelvis was performed.  Sagittal and coronal reformats were performed.    FINDINGS:  LOWER CHEST: Bibasilar groundglass opacities, better evaluated on recent   chest CT.    LIVER: Within normal limits.  BILE DUCTS: Trace central pneumobilia, which has decreased since prior   study. No intra or extrahepatic biliary ductal dilatation. No abnormal   enhancement of the bile ducts.  GALLBLADDER: Cholelithiasis. Decreased intraluminal air.  SPLEEN: Within normal limits.  PANCREAS: Within normal limits.  ADRENALS: Within normal limits.  KIDNEYS/URETERS: No hydronephrosis. Left renal cyst. Indeterminate 4 mm   lesion arising from the left lower pole (3, 69).    BLADDER: Within normal limits.  REPRODUCTIVE ORGANS: Prostate within normal limits.    BOWEL: No bowel obstruction. Appendix is normal. Small hiatal hernia.  PERITONEUM: No ascites.  VESSELS: Within normal limits.  RETROPERITONEUM/LYMPH NODES: No lymphadenopathy.  ABDOMINAL WALL: Within normal limits.  BONES: Within normal limits.    IMPRESSION:  Decreased central pneumobilia and air in the gallbladder, which may be   related to prior sphincterotomy. Correlate for recent instrumentation.  An indeterminate tiny lesion arising from the lower pole of left kidney   for which further characterization with MRI is advised on outpatient   basis.    --- End of Report ---          ZOYA ODOM MD; Resident Radiologist  This document has been electronically signed.  DEMETRIO MARTINEZ MD; Attending Radiologist  This document has been electronically signed. Dec  7 2023 10:21AM    < end of copied text >      < from: TTE Limited W or WO Ultrasound Enhancing Agent (12.07.23 @ 12:59) >  CONCLUSIONS:      1. Left ventricular systolic function is normal with an ejection fraction of 51 % by Frances's method of disks.   2. No pericardial effusion seen.  3. Compared to the transthoracic echocardiogram performed on 10/13/2023 Low nornal EF/.   4. There is no evidence of a left ventricular thrombus.    < end of copied text >

## 2023-12-08 NOTE — CONSULT NOTE ADULT - REASON FOR ADMISSION
RSV in immunocompromised pt, acute hypoxemic respiratory failure, asthma exacerbation

## 2023-12-08 NOTE — DISCHARGE NOTE PROVIDER - CARE PROVIDERS DIRECT ADDRESSES
,DirectAddress_Unknown,[email or web address] ,DirectAddress_Unknown,[email or web address]

## 2023-12-08 NOTE — PROGRESS NOTE ADULT - REASON FOR ADMISSION
RSV in immunocompromised pt, acute hypoxemic respiratory failure, asthma exacerbation

## 2023-12-09 LAB
CULTURE RESULTS: SIGNIFICANT CHANGE UP
GRAM STN FLD: ABNORMAL
GRAM STN FLD: ABNORMAL
SPECIMEN SOURCE: SIGNIFICANT CHANGE UP

## 2023-12-10 LAB
CULTURE RESULTS: ABNORMAL
CULTURE RESULTS: ABNORMAL
SPECIMEN SOURCE: SIGNIFICANT CHANGE UP
SPECIMEN SOURCE: SIGNIFICANT CHANGE UP

## 2024-12-18 ENCOUNTER — INPATIENT (INPATIENT)
Facility: HOSPITAL | Age: 69
LOS: 4 days | Discharge: HOME | DRG: 871 | End: 2024-12-23
Attending: INTERNAL MEDICINE | Admitting: INTERNAL MEDICINE
Payer: MEDICAID

## 2024-12-18 VITALS
DIASTOLIC BLOOD PRESSURE: 64 MMHG | OXYGEN SATURATION: 95 % | WEIGHT: 199.96 LBS | HEART RATE: 123 BPM | RESPIRATION RATE: 22 BRPM | SYSTOLIC BLOOD PRESSURE: 103 MMHG | HEIGHT: 64 IN | TEMPERATURE: 103 F

## 2024-12-18 DIAGNOSIS — A41.9 SEPSIS, UNSPECIFIED ORGANISM: ICD-10-CM

## 2024-12-18 DIAGNOSIS — Z29.9 ENCOUNTER FOR PROPHYLACTIC MEASURES, UNSPECIFIED: ICD-10-CM

## 2024-12-18 DIAGNOSIS — J96.01 ACUTE RESPIRATORY FAILURE WITH HYPOXIA: ICD-10-CM

## 2024-12-18 DIAGNOSIS — C90.00 MULTIPLE MYELOMA NOT HAVING ACHIEVED REMISSION: ICD-10-CM

## 2024-12-18 DIAGNOSIS — B33.8 OTHER SPECIFIED VIRAL DISEASES: ICD-10-CM

## 2024-12-18 DIAGNOSIS — E03.9 HYPOTHYROIDISM, UNSPECIFIED: ICD-10-CM

## 2024-12-18 DIAGNOSIS — I50.22 CHRONIC SYSTOLIC (CONGESTIVE) HEART FAILURE: ICD-10-CM

## 2024-12-18 DIAGNOSIS — J45.901 UNSPECIFIED ASTHMA WITH (ACUTE) EXACERBATION: ICD-10-CM

## 2024-12-18 LAB
ADD ON TEST-SPECIMEN IN LAB: SIGNIFICANT CHANGE UP
ALBUMIN SERPL ELPH-MCNC: 3.7 G/DL — SIGNIFICANT CHANGE UP (ref 3.3–5)
ALP SERPL-CCNC: 74 U/L — SIGNIFICANT CHANGE UP (ref 40–120)
ALT FLD-CCNC: 21 U/L — SIGNIFICANT CHANGE UP (ref 10–45)
ANION GAP SERPL CALC-SCNC: 15 MMOL/L — SIGNIFICANT CHANGE UP (ref 5–17)
APPEARANCE UR: CLEAR — SIGNIFICANT CHANGE UP
APTT BLD: 27.4 SEC — SIGNIFICANT CHANGE UP (ref 24.5–35.6)
AST SERPL-CCNC: 19 U/L — SIGNIFICANT CHANGE UP (ref 10–40)
BACTERIA # UR AUTO: NEGATIVE /HPF — SIGNIFICANT CHANGE UP
BASOPHILS # BLD AUTO: 0 K/UL — SIGNIFICANT CHANGE UP (ref 0–0.2)
BASOPHILS NFR BLD AUTO: 0 % — SIGNIFICANT CHANGE UP (ref 0–2)
BILIRUB SERPL-MCNC: 0.5 MG/DL — SIGNIFICANT CHANGE UP (ref 0.2–1.2)
BILIRUB UR-MCNC: NEGATIVE — SIGNIFICANT CHANGE UP
BUN SERPL-MCNC: 29 MG/DL — HIGH (ref 7–23)
CALCIUM SERPL-MCNC: 8.8 MG/DL — SIGNIFICANT CHANGE UP (ref 8.4–10.5)
CAST: 7 /LPF — HIGH (ref 0–4)
CHLORIDE SERPL-SCNC: 102 MMOL/L — SIGNIFICANT CHANGE UP (ref 96–108)
CO2 SERPL-SCNC: 20 MMOL/L — LOW (ref 22–31)
COLOR SPEC: YELLOW — SIGNIFICANT CHANGE UP
CREAT SERPL-MCNC: 1.33 MG/DL — HIGH (ref 0.5–1.3)
DIFF PNL FLD: NEGATIVE — SIGNIFICANT CHANGE UP
EGFR: 58 ML/MIN/1.73M2 — LOW
EOSINOPHIL # BLD AUTO: 0 K/UL — SIGNIFICANT CHANGE UP (ref 0–0.5)
EOSINOPHIL NFR BLD AUTO: 0 % — SIGNIFICANT CHANGE UP (ref 0–6)
FLUAV AG NPH QL: SIGNIFICANT CHANGE UP
FLUBV AG NPH QL: SIGNIFICANT CHANGE UP
GAS PNL BLDV: SIGNIFICANT CHANGE UP
GLUCOSE SERPL-MCNC: 138 MG/DL — HIGH (ref 70–99)
GLUCOSE UR QL: 250 MG/DL
HCT VFR BLD CALC: 41.6 % — SIGNIFICANT CHANGE UP (ref 39–50)
HGB BLD-MCNC: 12.4 G/DL — LOW (ref 13–17)
INR BLD: 1.12 RATIO — SIGNIFICANT CHANGE UP (ref 0.85–1.16)
KETONES UR-MCNC: NEGATIVE MG/DL — SIGNIFICANT CHANGE UP
LACTATE SERPL-SCNC: 2.9 MMOL/L — HIGH (ref 0.5–2)
LEUKOCYTE ESTERASE UR-ACNC: NEGATIVE — SIGNIFICANT CHANGE UP
LYMPHOCYTES # BLD AUTO: 14 % — SIGNIFICANT CHANGE UP (ref 13–44)
LYMPHOCYTES # BLD AUTO: 2.58 K/UL — SIGNIFICANT CHANGE UP (ref 1–3.3)
MANUAL SMEAR VERIFICATION: SIGNIFICANT CHANGE UP
MCHC RBC-ENTMCNC: 27.6 PG — SIGNIFICANT CHANGE UP (ref 27–34)
MCHC RBC-ENTMCNC: 29.8 G/DL — LOW (ref 32–36)
MCV RBC AUTO: 92.4 FL — SIGNIFICANT CHANGE UP (ref 80–100)
METAMYELOCYTES # FLD: 0.9 % — HIGH (ref 0–0)
MONOCYTES # BLD AUTO: 0.17 K/UL — SIGNIFICANT CHANGE UP (ref 0–0.9)
MONOCYTES NFR BLD AUTO: 0.9 % — LOW (ref 2–14)
NEUTROPHILS # BLD AUTO: 15.53 K/UL — HIGH (ref 1.8–7.4)
NEUTROPHILS NFR BLD AUTO: 66.7 % — SIGNIFICANT CHANGE UP (ref 43–77)
NEUTS BAND # BLD: 17.5 % — HIGH (ref 0–8)
NITRITE UR-MCNC: NEGATIVE — SIGNIFICANT CHANGE UP
OVALOCYTES BLD QL SMEAR: SLIGHT — SIGNIFICANT CHANGE UP
PH UR: 5.5 — SIGNIFICANT CHANGE UP (ref 5–8)
PLAT MORPH BLD: NORMAL — SIGNIFICANT CHANGE UP
PLATELET # BLD AUTO: 106 K/UL — LOW (ref 150–400)
POIKILOCYTOSIS BLD QL AUTO: SLIGHT — SIGNIFICANT CHANGE UP
POLYCHROMASIA BLD QL SMEAR: SLIGHT — SIGNIFICANT CHANGE UP
POTASSIUM SERPL-MCNC: 3.4 MMOL/L — LOW (ref 3.5–5.3)
POTASSIUM SERPL-SCNC: 3.4 MMOL/L — LOW (ref 3.5–5.3)
PROCALCITONIN SERPL-MCNC: 2.14 NG/ML — HIGH (ref 0.02–0.1)
PROT SERPL-MCNC: 6.8 G/DL — SIGNIFICANT CHANGE UP (ref 6–8.3)
PROT UR-MCNC: 30 MG/DL
PROTHROM AB SERPL-ACNC: 12.7 SEC — SIGNIFICANT CHANGE UP (ref 9.9–13.4)
RBC # BLD: 4.5 M/UL — SIGNIFICANT CHANGE UP (ref 4.2–5.8)
RBC # FLD: 17.2 % — HIGH (ref 10.3–14.5)
RBC BLD AUTO: ABNORMAL
RBC CASTS # UR COMP ASSIST: 0 /HPF — SIGNIFICANT CHANGE UP (ref 0–4)
REVIEW: SIGNIFICANT CHANGE UP
RSV RNA NPH QL NAA+NON-PROBE: DETECTED
SARS-COV-2 RNA SPEC QL NAA+PROBE: SIGNIFICANT CHANGE UP
SODIUM SERPL-SCNC: 137 MMOL/L — SIGNIFICANT CHANGE UP (ref 135–145)
SP GR SPEC: 1.02 — SIGNIFICANT CHANGE UP (ref 1–1.03)
SQUAMOUS # UR AUTO: 1 /HPF — SIGNIFICANT CHANGE UP (ref 0–5)
STOMATOCYTES BLD QL SMEAR: SLIGHT — SIGNIFICANT CHANGE UP
TARGETS BLD QL SMEAR: SLIGHT — SIGNIFICANT CHANGE UP
TROPONIN T, HIGH SENSITIVITY RESULT: 18 NG/L — SIGNIFICANT CHANGE UP (ref 0–51)
UROBILINOGEN FLD QL: 0.2 MG/DL — SIGNIFICANT CHANGE UP (ref 0.2–1)
WBC # BLD: 18.44 K/UL — HIGH (ref 3.8–10.5)
WBC # FLD AUTO: 18.44 K/UL — HIGH (ref 3.8–10.5)
WBC UR QL: 0 /HPF — SIGNIFICANT CHANGE UP (ref 0–5)

## 2024-12-18 PROCEDURE — 99291 CRITICAL CARE FIRST HOUR: CPT

## 2024-12-18 PROCEDURE — 71045 X-RAY EXAM CHEST 1 VIEW: CPT | Mod: 26

## 2024-12-18 PROCEDURE — 93010 ELECTROCARDIOGRAM REPORT: CPT

## 2024-12-18 PROCEDURE — 99223 1ST HOSP IP/OBS HIGH 75: CPT

## 2024-12-18 RX ORDER — IPRATROPIUM BROMIDE AND ALBUTEROL SULFATE .5; 2.5 MG/3ML; MG/3ML
3 SOLUTION RESPIRATORY (INHALATION) ONCE
Refills: 0 | Status: COMPLETED | OUTPATIENT
Start: 2024-12-18 | End: 2024-12-18

## 2024-12-18 RX ORDER — LEVOTHYROXINE SODIUM 175 UG/1
50 TABLET ORAL DAILY
Refills: 0 | Status: DISCONTINUED | OUTPATIENT
Start: 2024-12-18 | End: 2024-12-23

## 2024-12-18 RX ORDER — DAPAGLIFLOZIN 5 MG/1
10 TABLET, FILM COATED ORAL DAILY
Refills: 0 | Status: DISCONTINUED | OUTPATIENT
Start: 2024-12-18 | End: 2024-12-23

## 2024-12-18 RX ORDER — ENOXAPARIN SODIUM 60 MG/.6ML
40 INJECTION INTRAVENOUS; SUBCUTANEOUS EVERY 24 HOURS
Refills: 0 | Status: DISCONTINUED | OUTPATIENT
Start: 2024-12-18 | End: 2024-12-23

## 2024-12-18 RX ORDER — TIOTROPIUM BROMIDE MONOHYDRATE 18 UG/1
2 CAPSULE ORAL; RESPIRATORY (INHALATION) DAILY
Refills: 0 | Status: DISCONTINUED | OUTPATIENT
Start: 2024-12-18 | End: 2024-12-23

## 2024-12-18 RX ORDER — GINKGO BILOBA 40 MG
3 CAPSULE ORAL AT BEDTIME
Refills: 0 | Status: DISCONTINUED | OUTPATIENT
Start: 2024-12-18 | End: 2024-12-23

## 2024-12-18 RX ORDER — IPRATROPIUM BROMIDE AND ALBUTEROL SULFATE .5; 2.5 MG/3ML; MG/3ML
3 SOLUTION RESPIRATORY (INHALATION) EVERY 6 HOURS
Refills: 0 | Status: DISCONTINUED | OUTPATIENT
Start: 2024-12-18 | End: 2024-12-23

## 2024-12-18 RX ORDER — SODIUM CHLORIDE 9 MG/ML
2800 INJECTION, SOLUTION INTRAMUSCULAR; INTRAVENOUS; SUBCUTANEOUS ONCE
Refills: 0 | Status: COMPLETED | OUTPATIENT
Start: 2024-12-18 | End: 2024-12-18

## 2024-12-18 RX ORDER — VANCOMYCIN HYDROCHLORIDE 5 G/100ML
1000 INJECTION, POWDER, LYOPHILIZED, FOR SOLUTION INTRAVENOUS ONCE
Refills: 0 | Status: COMPLETED | OUTPATIENT
Start: 2024-12-18 | End: 2024-12-18

## 2024-12-18 RX ORDER — LEVOTHYROXINE SODIUM 175 UG/1
1 TABLET ORAL
Refills: 0 | DISCHARGE

## 2024-12-18 RX ORDER — MAGNESIUM SULFATE 500 MG/ML
2 INJECTION, SOLUTION INTRAMUSCULAR; INTRAVENOUS ONCE
Refills: 0 | Status: COMPLETED | OUTPATIENT
Start: 2024-12-18 | End: 2024-12-18

## 2024-12-18 RX ORDER — VANCOMYCIN HYDROCHLORIDE 5 G/100ML
750 INJECTION, POWDER, LYOPHILIZED, FOR SOLUTION INTRAVENOUS EVERY 12 HOURS
Refills: 0 | Status: DISCONTINUED | OUTPATIENT
Start: 2024-12-18 | End: 2024-12-19

## 2024-12-18 RX ORDER — AZITHROMYCIN MONOHYDRATE 200 MG/5ML
500 POWDER, FOR SUSPENSION ORAL ONCE
Refills: 0 | Status: COMPLETED | OUTPATIENT
Start: 2024-12-18 | End: 2024-12-18

## 2024-12-18 RX ORDER — FLUTICASONE PROPIONATE AND SALMETEROL 50; 500 UG/1; UG/1
1 POWDER ORAL; RESPIRATORY (INHALATION)
Refills: 0 | Status: DISCONTINUED | OUTPATIENT
Start: 2024-12-18 | End: 2024-12-23

## 2024-12-18 RX ORDER — FAMOTIDINE 20 MG/1
40 TABLET, FILM COATED ORAL DAILY
Refills: 0 | Status: DISCONTINUED | OUTPATIENT
Start: 2024-12-18 | End: 2024-12-23

## 2024-12-18 RX ORDER — METHYLPREDNISOLONE 4 MG/1
40 TABLET ORAL ONCE
Refills: 0 | Status: COMPLETED | OUTPATIENT
Start: 2024-12-18 | End: 2024-12-18

## 2024-12-18 RX ORDER — AZITHROMYCIN MONOHYDRATE 200 MG/5ML
500 POWDER, FOR SUSPENSION ORAL EVERY 24 HOURS
Refills: 0 | Status: DISCONTINUED | OUTPATIENT
Start: 2024-12-18 | End: 2024-12-20

## 2024-12-18 RX ORDER — METHYLPREDNISOLONE 4 MG/1
40 TABLET ORAL
Refills: 0 | Status: DISCONTINUED | OUTPATIENT
Start: 2024-12-18 | End: 2024-12-19

## 2024-12-18 RX ORDER — ACETAMINOPHEN 80 MG/.8ML
650 SOLUTION/ DROPS ORAL ONCE
Refills: 0 | Status: COMPLETED | OUTPATIENT
Start: 2024-12-18 | End: 2024-12-18

## 2024-12-18 RX ORDER — METHYLPREDNISOLONE 4 MG/1
40 TABLET ORAL
Refills: 0 | Status: DISCONTINUED | OUTPATIENT
Start: 2024-12-18 | End: 2024-12-18

## 2024-12-18 RX ORDER — ACETAMINOPHEN 80 MG/.8ML
650 SOLUTION/ DROPS ORAL EVERY 6 HOURS
Refills: 0 | Status: DISCONTINUED | OUTPATIENT
Start: 2024-12-18 | End: 2024-12-23

## 2024-12-18 RX ORDER — MONTELUKAST SODIUM 10 MG/1
10 TABLET, FILM COATED ORAL DAILY
Refills: 0 | Status: DISCONTINUED | OUTPATIENT
Start: 2024-12-18 | End: 2024-12-23

## 2024-12-18 RX ORDER — MONTELUKAST SODIUM 10 MG/1
1 TABLET, FILM COATED ORAL
Refills: 0 | DISCHARGE

## 2024-12-18 RX ORDER — VANCOMYCIN HYDROCHLORIDE 5 G/100ML
1250 INJECTION, POWDER, LYOPHILIZED, FOR SOLUTION INTRAVENOUS EVERY 12 HOURS
Refills: 0 | Status: DISCONTINUED | OUTPATIENT
Start: 2024-12-18 | End: 2024-12-18

## 2024-12-18 RX ORDER — SENNOSIDES 8.6 MG/1
2 TABLET, FILM COATED ORAL AT BEDTIME
Refills: 0 | Status: DISCONTINUED | OUTPATIENT
Start: 2024-12-18 | End: 2024-12-23

## 2024-12-18 RX ADMIN — IPRATROPIUM BROMIDE AND ALBUTEROL SULFATE 3 MILLILITER(S): .5; 2.5 SOLUTION RESPIRATORY (INHALATION) at 17:39

## 2024-12-18 RX ADMIN — ACETAMINOPHEN 650 MILLIGRAM(S): 80 SOLUTION/ DROPS ORAL at 20:41

## 2024-12-18 RX ADMIN — IPRATROPIUM BROMIDE AND ALBUTEROL SULFATE 3 MILLILITER(S): .5; 2.5 SOLUTION RESPIRATORY (INHALATION) at 17:38

## 2024-12-18 RX ADMIN — Medication 100 MILLIGRAM(S): at 17:38

## 2024-12-18 RX ADMIN — SENNOSIDES 2 TABLET(S): 8.6 TABLET, FILM COATED ORAL at 22:01

## 2024-12-18 RX ADMIN — Medication 100 MILLIGRAM(S): at 21:49

## 2024-12-18 RX ADMIN — METHYLPREDNISOLONE 40 MILLIGRAM(S): 4 TABLET ORAL at 17:38

## 2024-12-18 RX ADMIN — VANCOMYCIN HYDROCHLORIDE 250 MILLIGRAM(S): 5 INJECTION, POWDER, LYOPHILIZED, FOR SOLUTION INTRAVENOUS at 18:43

## 2024-12-18 RX ADMIN — MAGNESIUM SULFATE 150 GRAM(S): 500 INJECTION, SOLUTION INTRAMUSCULAR; INTRAVENOUS at 17:38

## 2024-12-18 RX ADMIN — AZITHROMYCIN MONOHYDRATE 500 MILLIGRAM(S): 200 POWDER, FOR SUSPENSION ORAL at 18:37

## 2024-12-18 RX ADMIN — ENOXAPARIN SODIUM 40 MILLIGRAM(S): 60 INJECTION INTRAVENOUS; SUBCUTANEOUS at 22:00

## 2024-12-18 RX ADMIN — ACETAMINOPHEN 650 MILLIGRAM(S): 80 SOLUTION/ DROPS ORAL at 17:37

## 2024-12-18 RX ADMIN — SODIUM CHLORIDE 2800 MILLILITER(S): 9 INJECTION, SOLUTION INTRAMUSCULAR; INTRAVENOUS; SUBCUTANEOUS at 17:36

## 2024-12-18 RX ADMIN — IPRATROPIUM BROMIDE AND ALBUTEROL SULFATE 3 MILLILITER(S): .5; 2.5 SOLUTION RESPIRATORY (INHALATION) at 22:10

## 2024-12-18 NOTE — ED ADULT NURSE NOTE - NS ED NURSE REPORT GIVEN DT
Department of Anesthesiology  Postprocedure Note    Patient: Delfino Brown  MRN: [id number]  YOB: 1984  Date of evaluation: 10/26/2018  Time:  12:42 PM     Procedure Summary     Date:  10/26/18 Room / Location:  [facility] [place] OR  / [facility] [place]    Anesthesia Start:  1120 Anesthesia Stop:  2198    Procedure:  APPENDECTOMY LAPAROSCOPIC (N/A ) Diagnosis:  (APPENDICTIS )    Surgeon:  Veronica Jefferson MD Responsible Provider:  STACIA Pate CRNA    Anesthesia Type:  general ASA Status:  3          Anesthesia Type: general    Graciela Phase I: Graciela Score: 10    Graciela Phase II:      Last vitals: Reviewed and per EMR flowsheets. Anesthesia Post Evaluation    Patient location during evaluation: PACU  Patient participation: complete - patient participated  Level of consciousness: awake and alert  Pain score: 0  Airway patency: patent  Nausea & Vomiting: no nausea and no vomiting  Complications: no  Cardiovascular status: hemodynamically stable and blood pressure returned to baseline  Respiratory status: acceptable and nasal cannula  Hydration status: stable  Comments: Vital Signs Stable. Exchanging well. PACU RN received care. 18-Dec-2024 20:50

## 2024-12-18 NOTE — ED PROVIDER NOTE - ATTENDING CONTRIBUTION TO CARE
This is a 68-year-old gentleman on oral chemo for multiple myeloma asthmatic on multiple medications as well hypothyroid now coming in with cough productive of yellow sputum fever 102 and was seen in the office with his oncologist and was sent to have a white blood cell count of over 25.  He recently had a viral syndrome.  I offered the patient a  telephone however he preferred his brother-in-law do the translating.  Patient is awake alert and talking and in moderate respiratory distress.  He is on nasal cannula oxygen.  He is having retractions.  There is wheeze in bilateral lungs with inspiratory and expiratory.  At the left base there are also crackles.  He is slightly tachycardic.  There is minimal JVD.  There is no leg edema.  There are no murmurs.  His abdomen is soft and nontender.  His mucous membranes are moist.  His skin is normal.  Clinically the patient appears to have pneumonia superimposed on an asthma exacerbation.  Given the fact that the patient is a cancer patient we did consider pulmonary embolism however the patient has alternate diagnoses which are clearly the case at this time and will hold off on diagnostics for PE at this time.  CBC CMP lactate MRSA swab viral panel chest x-ray blood cultures IV cefepime DuoNebs x 3 IV steroids IV fluids reassess.  Patient meets the sepsis inclusion criteria.

## 2024-12-18 NOTE — H&P ADULT - PROBLEM SELECTOR PLAN 1
- CXR w/ RLL opacity, febrile to 102.6\  - S/p Vanc/Cefepime/Azithro + IVNS 2.8L  - C/w Vancomycin + Cefepime for now  - F/u blood and sputum cultures, procalcitonin, urine strep and legionella, and MRSA/MSSA PCR - CXR w/ RLL opacity, febrile to 102.6  - RVP + for RSV  - S/p Vanc/Cefepime/Azithro + IVNS 2.8L  - C/w Vancomycin + Cefepime + azithromycin for now  - F/u blood and sputum cultures, procalcitonin, urine strep and legionella, and MRSA/MSSA PCR  - If no evidence of bacterial infection based on workup, d/c abx, may all be due to RSV  - ECG personally reviewed, sinus tachycardia, likely in the setting of infection + hypoxia

## 2024-12-18 NOTE — H&P ADULT - PROBLEM SELECTOR PLAN 2
- Due to underlying pneumonia  - Start Duonebs q6hrs + IV Solumedrol 40mg BID  - Check peak flows BID - Due to underlying pneumonia and RSV  - Start Duonebs q6hrs + IV Solumedrol 40mg BID  - Advair + Spiriva in setting of home trelegy  - Continue montelukast 10mg QHS  - Check peak flows BID

## 2024-12-18 NOTE — H&P ADULT - PROBLEM SELECTOR PLAN 5
- C/w levothyroxine 50mcg daily - Seems to have recovered per last TTE - EF 51%  - C/w Farxiga 10mg daily

## 2024-12-18 NOTE — H&P ADULT - PROBLEM SELECTOR PLAN 3
- Currently requiring 4LNC  - Titrate supplemental oxygen as needed to maintain SpO2 >92%  - Likely 2/2 PNA + asthma exacerbation  - Monitor for improvement with steroids and abx - RSV positive on RVP  - Continue supportive care

## 2024-12-18 NOTE — ED ADULT NURSE NOTE - OBJECTIVE STATEMENT
1715 pt 68ym bib family aox4 fever for the past couple of days on chemo po for multiple myeloma, noted wheezing, febrile 102 oral, vitals wnl, pending eval

## 2024-12-18 NOTE — H&P ADULT - PROBLEM SELECTOR PLAN 4
----- Message from Soo Roberson MD sent at 5/17/2022  5:06 PM CDT -----  Please call pt basal cell ca is pigmented, not mole but bcc, best with mohs slide dr dominguez please assist preop dr dominguez or shannan frederick   - Seems to have recovered per last TTE - EF 51%  - Not on HF meds - Currently requiring 4LNC  - Titrate supplemental oxygen as needed to maintain SpO2 >92%  - Likely 2/2 PNA + asthma exacerbation  - Monitor for improvement with steroids and abx

## 2024-12-18 NOTE — H&P ADULT - NSHPPHYSICALEXAM_GEN_ALL_CORE
Vital Signs Last 24 Hrs  T(C): 36.8 (18 Dec 2024 19:44), Max: 39.2 (18 Dec 2024 16:16)  T(F): 98.2 (18 Dec 2024 19:44), Max: 102.6 (18 Dec 2024 16:16)  HR: 110 (18 Dec 2024 19:44) (110 - 123)  BP: 100/66 (18 Dec 2024 19:44) (90/57 - 103/64)  BP(mean): 75 (18 Dec 2024 19:44) (75 - 75)  RR: 20 (18 Dec 2024 19:44) (20 - 22)  SpO2: 96% (18 Dec 2024 19:44) (95% - 96%)    Parameters below as of 18 Dec 2024 19:44  Patient On (Oxygen Delivery Method): nasal cannula  O2 Flow (L/min): 4      CONSTITUTIONAL: Well-groomed, in no apparent distress  EYES: No conjunctival or scleral injection, non-icteric;   NECK: Trachea midline without palpable neck mass  RESPIRATORY: Breathing comfortably; lungs CTA without wheeze/rhonchi/rales  CARDIOVASCULAR: +S1S2, RRR, no M/G/R; no lower extremity edema  GASTROINTESTINAL: No palpable masses or tenderness, +BS throughout, no rebound/guarding  SKIN: No rashes or ulcers noted  NEUROLOGIC: Sensation intact in LEs b/l to light touch  PSYCHIATRIC: A+O x 3 Vital Signs Last 24 Hrs  T(C): 36.8 (18 Dec 2024 19:44), Max: 39.2 (18 Dec 2024 16:16)  T(F): 98.2 (18 Dec 2024 19:44), Max: 102.6 (18 Dec 2024 16:16)  HR: 110 (18 Dec 2024 19:44) (110 - 123)  BP: 100/66 (18 Dec 2024 19:44) (90/57 - 103/64)  BP(mean): 75 (18 Dec 2024 19:44) (75 - 75)  RR: 20 (18 Dec 2024 19:44) (20 - 22)  SpO2: 96% (18 Dec 2024 19:44) (95% - 96%)    Parameters below as of 18 Dec 2024 19:44  Patient On (Oxygen Delivery Method): nasal cannula  O2 Flow (L/min): 4      CONSTITUTIONAL: Well-groomed, in mild distress  EYES: No conjunctival or scleral injection, non-icteric;   NECK: Trachea midline without palpable neck mass  RESPIRATORY: Tachypneic, on 4LNC, coarse rhonchi and expiratory wheezing bilaterally across all lung fields  CARDIOVASCULAR: +S1S2, RRR, no M/G/R; no lower extremity edema  GASTROINTESTINAL: No palpable masses or tenderness, +BS throughout, no rebound/guarding  SKIN: No rashes or ulcers noted  NEUROLOGIC: Sensation intact in LEs b/l to light touch  PSYCHIATRIC: A+O x 3

## 2024-12-18 NOTE — H&P ADULT - NSHPLABSRESULTS_GEN_ALL_CORE
12.4   18.44 )-----------( 106      ( 18 Dec 2024 17:26 )             41.6     18    137  |  102  |  29[H]  ----------------------------<  138[H]  3.4[L]   |  20[L]  |  1.33[H]    Ca    8.8      18 Dec 2024 17:26    TPro  6.8  /  Alb  3.7  /  TBili  0.5  /  DBili  x   /  AST  19  /  ALT  21  /  AlkPhos  74  12-18          LIVER FUNCTIONS - ( 18 Dec 2024 17:26 )  Alb: 3.7 g/dL / Pro: 6.8 g/dL / ALK PHOS: 74 U/L / ALT: 21 U/L / AST: 19 U/L / GGT: x           PT/INR - ( 18 Dec 2024 17:26 )   PT: 12.7 sec;   INR: 1.12 ratio         PTT - ( 18 Dec 2024 17:26 )  PTT:27.4 sec  Urinalysis Basic - ( 18 Dec 2024 19:27 )    Color: Yellow / Appearance: Clear / S.019 / pH: x  Gluc: x / Ketone: Negative mg/dL  / Bili: Negative / Urobili: 0.2 mg/dL   Blood: x / Protein: 30 mg/dL / Nitrite: Negative   Leuk Esterase: Negative / RBC: 0 /HPF / WBC 0 /HPF   Sq Epi: x / Non Sq Epi: 1 /HPF / Bacteria: Negative /HPF

## 2024-12-18 NOTE — ED ADULT NURSE REASSESSMENT NOTE - NS ED NURSE REASSESS COMMENT FT1
Report received from ABBY Fernandez. Pt received A&Ox4, ale speaking, vitals retaken and documented, on 4L NC o2, pt fluids still infusing, post fluids repeat lactate to be drawn as per MD order, urine sample collected and sent to lab. Bed locked and in lowest position, side rails raised, call bell within reach. Currently pending medicine bed.

## 2024-12-18 NOTE — H&P ADULT - PROBLEM SELECTOR PLAN 7
DVT PPx: Lovenox - Getting oral chemotherapy outpatient - on Ninlaro 3mg once a week for 3 weeks, followed by 1 week break - currently on the break week

## 2024-12-18 NOTE — H&P ADULT - NSHPREVIEWOFSYSTEMS_GEN_ALL_CORE
Review of Systems:   CONSTITUTIONAL: No fever, weight loss  EYES: No eye pain, visual disturbances, or discharge  RESPIRATORY: No SOB. No cough, wheezing, chills or hemoptysis  CARDIOVASCULAR: No chest pain, palpitations, dizziness, or leg swelling  GASTROINTESTINAL: No abdominal or epigastric pain. No nausea, vomiting, or hematemesis; No diarrhea or constipation. No melena or hematochezia.  GENITOURINARY: No dysuria, frequency, hematuria, or incontinence  NEUROLOGICAL: No headaches, memory loss, loss of strength, numbness, or tremors  SKIN: No itching, burning, rashes, or lesions   MUSCULOSKELETAL: No joint pain or swelling; No muscle, back pain  PSYCHIATRIC: No depression, anxiety, mood swings, or difficulty sleeping  HEME/LYMPH: No easy bruising, or bleeding gums Review of Systems:   CONSTITUTIONAL: +Fever, no weight loss  EYES: No eye pain, visual disturbances, or discharge  RESPIRATORY: +SOB, cough, and wheezing. No chills or hemoptysis  CARDIOVASCULAR: No chest pain, palpitations, dizziness, or leg swelling  GASTROINTESTINAL: No abdominal or epigastric pain. No nausea, vomiting, or hematemesis; No diarrhea or constipation. No melena or hematochezia.  GENITOURINARY: No dysuria, frequency, hematuria, or incontinence  NEUROLOGICAL: No headaches, memory loss, loss of strength, numbness, or tremors  SKIN: No itching, burning, rashes, or lesions   MUSCULOSKELETAL: No joint pain or swelling; No muscle, back pain  PSYCHIATRIC: No depression, anxiety, mood swings, or difficulty sleeping  HEME/LYMPH: No easy bruising, or bleeding gums

## 2024-12-18 NOTE — H&P ADULT - ASSESSMENT
69 y/o male w/ PMHx multiple myeloma on chemo (daratumumab?), HFrEF (EF 47%) 2/2 NICM due to chemotherapy, hypothyroidism, and asthma who presents for fever and productive cough. Found to have RLL opacity on CXR along w/ wheezing on exam. Admitted for AHRF in setting of RLL pneumonia complicated by asthma exacerbation. 67 y/o male w/ PMHx multiple myeloma on chemo (daratumumab?), HFrEF (EF 47%) 2/2 NICM due to chemotherapy, hypothyroidism, and asthma who presents for fever and productive cough. Found to have RLL opacity on CXR along w/ wheezing on exam. Admitted for AHRF in setting of RLL pneumonia + RSV complicated by asthma exacerbation.

## 2024-12-18 NOTE — H&P ADULT - HISTORY OF PRESENT ILLNESS
This is a 69 y/o male w/ PMHx multiple myeloma on chemo (daratumumab?), HFrEF (EF 47%) 2/2 NICM due to chemotherapy, hypothyroidism, and asthma who presents for fever and cough. He has been having fevers up to 102 at home and cough that is productive of yellow sputum. Recently had a viral respiratory infection. He went to his oncologist's office today, was found to be febrile in the office. Had labs checked as well, and his CBC came back w/ a WBC of 25. He was told to go to the hospital.    In the ED, he was febrile to 102.6, tachycardic, hemodynamically stable (BPs borderline low), and requiring 4LNC. CBC w/ WBC 18.44 and thrombocytopenia of 106. CMP w/ hypokalemia of 3.4 and mild CAITY w/ BUN/Cr 29/1.33. CXR w/ faint RLL opacity. Given Vanc/Cefepime/azithromycin, IVNS 2.8L, Duonebs x3, mag sulfate and IV solumedrol 40mg in the ED.    This is a 67 y/o male w/ PMHx multiple myeloma on Ninlaro, HFrEF (EF 47%) 2/2 NICM due to chemotherapy, hypothyroidism, and asthma who presents for fever and cough. His symptoms started 4 days ago, but got a lot worse last night, when he started developing fevers up to 102 at home and cough that is productive of yellow sputum. He went to his oncologist's (Dr. Agustin Block) office today, was found to be febrile in the office with wheezing and tachyonea. Had labs checked as well, and his CBC came back w/ a WBC of 25. He was told to go to the hospital.    In the ED, he was febrile to 102.6, tachycardic, hemodynamically stable (BPs borderline low), tachypneic and requiring 4LNC. CBC w/ WBC 18.44 and thrombocytopenia of 106. CMP w/ hypokalemia of 3.4 and mild CAITY w/ BUN/Cr 29/1.33. CXR w/ faint RLL opacity. RVP + for RSV. Given Vanc/Cefepime/azithromycin, IVNS 2.8L, Duonebs x3, mag sulfate and IV solumedrol 40mg in the ED.

## 2024-12-19 LAB
ALBUMIN SERPL ELPH-MCNC: 3.3 G/DL — SIGNIFICANT CHANGE UP (ref 3.3–5)
ALP SERPL-CCNC: 56 U/L — SIGNIFICANT CHANGE UP (ref 40–120)
ALT FLD-CCNC: 17 U/L — SIGNIFICANT CHANGE UP (ref 10–45)
ANION GAP SERPL CALC-SCNC: 13 MMOL/L — SIGNIFICANT CHANGE UP (ref 5–17)
AST SERPL-CCNC: 13 U/L — SIGNIFICANT CHANGE UP (ref 10–40)
BASOPHILS # BLD AUTO: 0 K/UL — SIGNIFICANT CHANGE UP (ref 0–0.2)
BASOPHILS NFR BLD AUTO: 0 % — SIGNIFICANT CHANGE UP (ref 0–2)
BILIRUB SERPL-MCNC: 0.3 MG/DL — SIGNIFICANT CHANGE UP (ref 0.2–1.2)
BUN SERPL-MCNC: 24 MG/DL — HIGH (ref 7–23)
CALCIUM SERPL-MCNC: 8 MG/DL — LOW (ref 8.4–10.5)
CHLORIDE SERPL-SCNC: 110 MMOL/L — HIGH (ref 96–108)
CO2 SERPL-SCNC: 17 MMOL/L — LOW (ref 22–31)
CREAT SERPL-MCNC: 1.1 MG/DL — SIGNIFICANT CHANGE UP (ref 0.5–1.3)
CULTURE RESULTS: SIGNIFICANT CHANGE UP
EGFR: 73 ML/MIN/1.73M2 — SIGNIFICANT CHANGE UP
EOSINOPHIL # BLD AUTO: 0 K/UL — SIGNIFICANT CHANGE UP (ref 0–0.5)
EOSINOPHIL NFR BLD AUTO: 0 % — SIGNIFICANT CHANGE UP (ref 0–6)
GLUCOSE SERPL-MCNC: 193 MG/DL — HIGH (ref 70–99)
HCT VFR BLD CALC: 35 % — LOW (ref 39–50)
HGB BLD-MCNC: 10.6 G/DL — LOW (ref 13–17)
IGA FLD-MCNC: 82 MG/DL — LOW (ref 84–499)
IGG FLD-MCNC: 514 MG/DL — LOW (ref 610–1660)
IGM SERPL-MCNC: 31 MG/DL — LOW (ref 35–242)
KAPPA LC SER QL IFE: 1.23 MG/DL — SIGNIFICANT CHANGE UP (ref 0.33–1.94)
KAPPA/LAMBDA FREE LIGHT CHAIN RATIO, SERUM: 0.7 RATIO — SIGNIFICANT CHANGE UP (ref 0.26–1.65)
LACTATE SERPL-SCNC: 2.5 MMOL/L — HIGH (ref 0.5–2)
LAMBDA LC SER QL IFE: 1.75 MG/DL — SIGNIFICANT CHANGE UP (ref 0.57–2.63)
LEGIONELLA AG UR QL: NEGATIVE — SIGNIFICANT CHANGE UP
LYMPHOCYTES # BLD AUTO: 1.22 K/UL — SIGNIFICANT CHANGE UP (ref 1–3.3)
LYMPHOCYTES # BLD AUTO: 5.3 % — LOW (ref 13–44)
MANUAL SMEAR VERIFICATION: SIGNIFICANT CHANGE UP
MCHC RBC-ENTMCNC: 28.6 PG — SIGNIFICANT CHANGE UP (ref 27–34)
MCHC RBC-ENTMCNC: 30.3 G/DL — LOW (ref 32–36)
MCV RBC AUTO: 94.3 FL — SIGNIFICANT CHANGE UP (ref 80–100)
MONOCYTES # BLD AUTO: 1.01 K/UL — HIGH (ref 0–0.9)
MONOCYTES NFR BLD AUTO: 4.4 % — SIGNIFICANT CHANGE UP (ref 2–14)
MRSA PCR RESULT.: SIGNIFICANT CHANGE UP
MRSA PCR RESULT.: SIGNIFICANT CHANGE UP
NEUTROPHILS # BLD AUTO: 20.78 K/UL — HIGH (ref 1.8–7.4)
NEUTROPHILS NFR BLD AUTO: 78.9 % — HIGH (ref 43–77)
NEUTS BAND # BLD: 11.4 % — HIGH (ref 0–8)
PLAT MORPH BLD: NORMAL — SIGNIFICANT CHANGE UP
PLATELET # BLD AUTO: 104 K/UL — LOW (ref 150–400)
POTASSIUM SERPL-MCNC: 3.7 MMOL/L — SIGNIFICANT CHANGE UP (ref 3.5–5.3)
POTASSIUM SERPL-SCNC: 3.7 MMOL/L — SIGNIFICANT CHANGE UP (ref 3.5–5.3)
PROT SERPL-MCNC: 6 G/DL — SIGNIFICANT CHANGE UP (ref 6–8.3)
RBC # BLD: 3.71 M/UL — LOW (ref 4.2–5.8)
RBC # FLD: 17.3 % — HIGH (ref 10.3–14.5)
RBC BLD AUTO: SIGNIFICANT CHANGE UP
S AUREUS DNA NOSE QL NAA+PROBE: SIGNIFICANT CHANGE UP
S AUREUS DNA NOSE QL NAA+PROBE: SIGNIFICANT CHANGE UP
S PNEUM AG UR QL: NEGATIVE — SIGNIFICANT CHANGE UP
SODIUM SERPL-SCNC: 140 MMOL/L — SIGNIFICANT CHANGE UP (ref 135–145)
SPECIMEN SOURCE: SIGNIFICANT CHANGE UP
WBC # BLD: 23.01 K/UL — HIGH (ref 3.8–10.5)
WBC # FLD AUTO: 23.01 K/UL — HIGH (ref 3.8–10.5)

## 2024-12-19 PROCEDURE — 99222 1ST HOSP IP/OBS MODERATE 55: CPT

## 2024-12-19 RX ORDER — METHYLPREDNISOLONE 4 MG/1
20 TABLET ORAL EVERY 8 HOURS
Refills: 0 | Status: DISCONTINUED | OUTPATIENT
Start: 2024-12-19 | End: 2024-12-21

## 2024-12-19 RX ORDER — GUAIFENESIN 100 MG/5ML
1200 SYRUP ORAL EVERY 12 HOURS
Refills: 0 | Status: DISCONTINUED | OUTPATIENT
Start: 2024-12-19 | End: 2024-12-23

## 2024-12-19 RX ADMIN — TIOTROPIUM BROMIDE MONOHYDRATE 2 PUFF(S): 18 CAPSULE ORAL; RESPIRATORY (INHALATION) at 11:58

## 2024-12-19 RX ADMIN — FLUTICASONE PROPIONATE AND SALMETEROL 1 DOSE(S): 50; 500 POWDER ORAL; RESPIRATORY (INHALATION) at 17:32

## 2024-12-19 RX ADMIN — MONTELUKAST SODIUM 10 MILLIGRAM(S): 10 TABLET, FILM COATED ORAL at 11:59

## 2024-12-19 RX ADMIN — Medication 1200 MILLIGRAM(S): at 17:31

## 2024-12-19 RX ADMIN — ENOXAPARIN SODIUM 40 MILLIGRAM(S): 60 INJECTION INTRAVENOUS; SUBCUTANEOUS at 21:26

## 2024-12-19 RX ADMIN — IPRATROPIUM BROMIDE AND ALBUTEROL SULFATE 3 MILLILITER(S): .5; 2.5 SOLUTION RESPIRATORY (INHALATION) at 05:06

## 2024-12-19 RX ADMIN — METHYLPREDNISOLONE 20 MILLIGRAM(S): 4 TABLET ORAL at 21:25

## 2024-12-19 RX ADMIN — IPRATROPIUM BROMIDE AND ALBUTEROL SULFATE 3 MILLILITER(S): .5; 2.5 SOLUTION RESPIRATORY (INHALATION) at 17:31

## 2024-12-19 RX ADMIN — Medication 100 MILLIGRAM(S): at 13:34

## 2024-12-19 RX ADMIN — AZITHROMYCIN MONOHYDRATE 255 MILLIGRAM(S): 200 POWDER, FOR SUSPENSION ORAL at 03:29

## 2024-12-19 RX ADMIN — FLUTICASONE PROPIONATE AND SALMETEROL 1 DOSE(S): 50; 500 POWDER ORAL; RESPIRATORY (INHALATION) at 05:06

## 2024-12-19 RX ADMIN — METHYLPREDNISOLONE 20 MILLIGRAM(S): 4 TABLET ORAL at 14:56

## 2024-12-19 RX ADMIN — LEVOTHYROXINE SODIUM 50 MICROGRAM(S): 175 TABLET ORAL at 05:07

## 2024-12-19 RX ADMIN — Medication 100 MILLIGRAM(S): at 05:06

## 2024-12-19 RX ADMIN — METHYLPREDNISOLONE 40 MILLIGRAM(S): 4 TABLET ORAL at 05:07

## 2024-12-19 RX ADMIN — FAMOTIDINE 40 MILLIGRAM(S): 20 TABLET, FILM COATED ORAL at 11:59

## 2024-12-19 RX ADMIN — VANCOMYCIN HYDROCHLORIDE 250 MILLIGRAM(S): 5 INJECTION, POWDER, LYOPHILIZED, FOR SOLUTION INTRAVENOUS at 05:06

## 2024-12-19 RX ADMIN — SENNOSIDES 2 TABLET(S): 8.6 TABLET, FILM COATED ORAL at 21:25

## 2024-12-19 RX ADMIN — DAPAGLIFLOZIN 10 MILLIGRAM(S): 5 TABLET, FILM COATED ORAL at 11:59

## 2024-12-19 RX ADMIN — IPRATROPIUM BROMIDE AND ALBUTEROL SULFATE 3 MILLILITER(S): .5; 2.5 SOLUTION RESPIRATORY (INHALATION) at 23:12

## 2024-12-19 RX ADMIN — IPRATROPIUM BROMIDE AND ALBUTEROL SULFATE 3 MILLILITER(S): .5; 2.5 SOLUTION RESPIRATORY (INHALATION) at 11:58

## 2024-12-19 NOTE — CONSULT NOTE ADULT - PROBLEM SELECTOR RECOMMENDATION 9
2nd to RSV infection   -Continue Duoneb q6h  -Continue Advair BID  -Continue Singulair  -Mucinex 1200 mg PO BID x 5 days   -F/u CT chest  -Solumedrol 20 mg IVP q8h  -Lowered to 4LNC. Wean O2 as tolerated. Keep sats >90%.

## 2024-12-19 NOTE — CONSULT NOTE ADULT - SUBJECTIVE AND OBJECTIVE BOX
PULMONARY CONSULT    HPI: 67 y/o M with PMH of multiple myeloma on Premalaro, HFrEF (EF 47%) 2/2 NICM due to chemotherapy, hypothyroidism, and asthma who presents for fever and cough. His symptoms started 4 days ago, but got a lot worse last night, when he started developing fevers up to 102F at home and cough that is productive of yellow sputum. He went to his oncologist's (Dr. Agustin Block) office, was found to be febrile with wheezing and tachypnea. In the ED, he was febrile to 102.6, tachycardic, tachypneic and requiring 4LNC. CXR with RLL opacity. Given Vanc/Cefepime/azithromycin. S/p Duonebs x3, mag sulfate and IV solumedrol 40mg in the ED. RVP + RSV.       PAST MEDICAL & SURGICAL HISTORY:  Hypothyroidism  Multiple myeloma  Asthma  No significant past surgical history        Allergies  sulfa drugs (Unknown)    FAMILY HISTORY:  FHx: leukemia      Social history: never smoker     Review of Systems:  CONSTITUTIONAL: No fever, chills, or fatigue  EYES: No eye pain, visual disturbances, or discharge  ENMT:  No difficulty hearing, tinnitus, vertigo; No sinus or throat pain  NECK: No pain or stiffness  RESPIRATORY: Per above  CARDIOVASCULAR: No chest pain, palpitations, dizziness, or leg swelling  GASTROINTESTINAL: No abdominal or epigastric pain. No nausea, vomiting, or hematemesis; No diarrhea or constipation. No melena or hematochezia.  GENITOURINARY: No dysuria, frequency, hematuria, or incontinence  NEUROLOGICAL: No headaches, memory loss, loss of strength, numbness, or tremors  SKIN: No itching, burning, rashes, or lesions   MUSCULOSKELETAL: No joint pain or swelling; No muscle, back, or extremity pain  PSYCHIATRIC: No depression, anxiety, mood swings, or difficulty sleeping      Medications:  MEDICATIONS  (STANDING):  albuterol/ipratropium for Nebulization 3 milliLiter(s) Nebulizer every 6 hours  azithromycin  IVPB 500 milliGRAM(s) IV Intermittent every 24 hours  cefepime   IVPB 2000 milliGRAM(s) IV Intermittent every 8 hours  dapagliflozin 10 milliGRAM(s) Oral daily  enoxaparin Injectable 40 milliGRAM(s) SubCutaneous every 24 hours  famotidine    Tablet 40 milliGRAM(s) Oral daily  fluticasone propionate/ salmeterol 250-50 MICROgram(s) Diskus 1 Dose(s) Inhalation two times a day  levothyroxine 50 MICROGram(s) Oral daily  methylPREDNISolone sodium succinate Injectable 40 milliGRAM(s) IV Push two times a day  montelukast 10 milliGRAM(s) Oral daily  senna 2 Tablet(s) Oral at bedtime  tiotropium 2.5 MICROgram(s) Inhaler 2 Puff(s) Inhalation daily  vancomycin  IVPB 750 milliGRAM(s) IV Intermittent every 12 hours    MEDICATIONS  (PRN):  acetaminophen     Tablet .. 650 milliGRAM(s) Oral every 6 hours PRN Temp greater or equal to 38C (100.4F), Mild Pain (1 - 3)  melatonin 3 milliGRAM(s) Oral at bedtime PRN Insomnia            Vital Signs Last 24 Hrs  T(C): 36.5 (19 Dec 2024 07:35), Max: 39.2 (18 Dec 2024 16:16)  T(F): 97.7 (19 Dec 2024 07:35), Max: 102.6 (18 Dec 2024 16:16)  HR: 90 (19 Dec 2024 07:35) (90 - 123)  BP: 101/67 (19 Dec 2024 07:35) (90/57 - 125/70)  BP(mean): 84 (18 Dec 2024 20:44) (75 - 84)  RR: 20 (19 Dec 2024 07:35) (20 - 22)  SpO2: 98% (19 Dec 2024 07:35) (95% - 99%)    Parameters below as of 19 Dec 2024 07:35  Patient On (Oxygen Delivery Method): nasal cannula  O2 Flow (L/min): 4        VBG pH 7.34 12-18 @ 17:24  VBG pCO2 44 12-18 @ 17:24  VBG O2 sat 50.3 12-18 @ 17:24  VBG lactate 2.5 12-18 @ 17:24            LABS:                        10.6   23.01 )-----------( 104      ( 19 Dec 2024 07:12 )             35.0     12-19    140  |  110[H]  |  24[H]  ----------------------------<  193[H]  3.7   |  17[L]  |  1.10    Ca    8.0[L]      19 Dec 2024 07:12    TPro  6.0  /  Alb  3.3  /  TBili  0.3  /  DBili  x   /  AST  13  /  ALT  17  /  AlkPhos  56  12-19          CAPILLARY BLOOD GLUCOSE        PT/INR - ( 18 Dec 2024 17:26 )   PT: 12.7 sec;   INR: 1.12 ratio         PTT - ( 18 Dec 2024 17:26 )  PTT:27.4 sec  Urinalysis Basic - ( 19 Dec 2024 07:12 )    Color: x / Appearance: x / SG: x / pH: x  Gluc: 193 mg/dL / Ketone: x  / Bili: x / Urobili: x   Blood: x / Protein: x / Nitrite: x   Leuk Esterase: x / RBC: x / WBC x   Sq Epi: x / Non Sq Epi: x / Bacteria: x      Procalcitonin: 2.14 ng/mL (12-18-24 @ 17:26)    Physical Examination:    General: No acute distress.      HEENT: Pupils equal, reactive to light.  Symmetric.    PULM: Clear to auscultation bilaterally, no significant sputum production    CVS: S1, S2    ABD: Soft, nondistended, nontender, normoactive bowel sounds, no masses    EXT: No edema, nontender    SKIN: Warm and well perfused, no rashes noted.    NEURO: Alert, oriented, interactive, nonfocal    RADIOLOGY REVIEWED  CXR: ? RLL infiltrate        PULMONARY CONSULT    HPI: 69 y/o M with PMH of multiple myeloma on Premalaro, HFrEF (EF 47%) 2/2 NICM due to chemotherapy, hypothyroidism, and asthma who presents for fever and cough. His symptoms started 4 days ago, but got a lot worse last night, when he started developing fevers up to 102F at home and cough that is productive of yellow sputum. He went to his oncologist's (Dr. Agustin Block) office, was found to be febrile with wheezing and tachypnea. In the ED, he was febrile to 102.6, tachycardic, tachypneic and requiring 4LNC. CXR with RLL opacity. Given Vanc/Cefepime/azithromycin. S/p Duonebs x3, mag sulfate and IV solumedrol 40mg in the ED. RVP + RSV. Julisa speaking, family assisting in translation at pt's request. Never smoker. + hx of asthma, compliant with home inhalers. +Cough, wheezing but SOB slowly improving since admission. O2 sats 96% on 5LNC.       PAST MEDICAL & SURGICAL HISTORY:  Hypothyroidism  Multiple myeloma  Asthma  No significant past surgical history        Allergies  sulfa drugs (Unknown)    FAMILY HISTORY:  FHx: leukemia      Social history: never smoker     Review of Systems:  CONSTITUTIONAL: No fever, chills, or fatigue  EYES: No eye pain, visual disturbances, or discharge  ENMT:  No difficulty hearing, tinnitus, vertigo; No sinus or throat pain  NECK: No pain or stiffness  RESPIRATORY: Per above  CARDIOVASCULAR: No chest pain, palpitations, dizziness, or leg swelling  GASTROINTESTINAL: No abdominal or epigastric pain. No nausea, vomiting, or hematemesis; No diarrhea or constipation. No melena or hematochezia.  GENITOURINARY: No dysuria, frequency, hematuria, or incontinence  NEUROLOGICAL: No headaches, memory loss, loss of strength, numbness, or tremors  SKIN: No itching, burning, rashes, or lesions   MUSCULOSKELETAL: No joint pain or swelling; No muscle, back, or extremity pain  PSYCHIATRIC: No depression, anxiety, mood swings, or difficulty sleeping      Medications:  MEDICATIONS  (STANDING):  albuterol/ipratropium for Nebulization 3 milliLiter(s) Nebulizer every 6 hours  azithromycin  IVPB 500 milliGRAM(s) IV Intermittent every 24 hours  cefepime   IVPB 2000 milliGRAM(s) IV Intermittent every 8 hours  dapagliflozin 10 milliGRAM(s) Oral daily  enoxaparin Injectable 40 milliGRAM(s) SubCutaneous every 24 hours  famotidine    Tablet 40 milliGRAM(s) Oral daily  fluticasone propionate/ salmeterol 250-50 MICROgram(s) Diskus 1 Dose(s) Inhalation two times a day  levothyroxine 50 MICROGram(s) Oral daily  methylPREDNISolone sodium succinate Injectable 40 milliGRAM(s) IV Push two times a day  montelukast 10 milliGRAM(s) Oral daily  senna 2 Tablet(s) Oral at bedtime  tiotropium 2.5 MICROgram(s) Inhaler 2 Puff(s) Inhalation daily  vancomycin  IVPB 750 milliGRAM(s) IV Intermittent every 12 hours    MEDICATIONS  (PRN):  acetaminophen     Tablet .. 650 milliGRAM(s) Oral every 6 hours PRN Temp greater or equal to 38C (100.4F), Mild Pain (1 - 3)  melatonin 3 milliGRAM(s) Oral at bedtime PRN Insomnia            Vital Signs Last 24 Hrs  T(C): 36.5 (19 Dec 2024 07:35), Max: 39.2 (18 Dec 2024 16:16)  T(F): 97.7 (19 Dec 2024 07:35), Max: 102.6 (18 Dec 2024 16:16)  HR: 90 (19 Dec 2024 07:35) (90 - 123)  BP: 101/67 (19 Dec 2024 07:35) (90/57 - 125/70)  BP(mean): 84 (18 Dec 2024 20:44) (75 - 84)  RR: 20 (19 Dec 2024 07:35) (20 - 22)  SpO2: 98% (19 Dec 2024 07:35) (95% - 99%)    Parameters below as of 19 Dec 2024 07:35  Patient On (Oxygen Delivery Method): nasal cannula  O2 Flow (L/min): 4        VBG pH 7.34 12-18 @ 17:24  VBG pCO2 44 12-18 @ 17:24  VBG O2 sat 50.3 12-18 @ 17:24  VBG lactate 2.5 12-18 @ 17:24            LABS:                        10.6   23.01 )-----------( 104      ( 19 Dec 2024 07:12 )             35.0     12-19    140  |  110[H]  |  24[H]  ----------------------------<  193[H]  3.7   |  17[L]  |  1.10    Ca    8.0[L]      19 Dec 2024 07:12    TPro  6.0  /  Alb  3.3  /  TBili  0.3  /  DBili  x   /  AST  13  /  ALT  17  /  AlkPhos  56  12-19          CAPILLARY BLOOD GLUCOSE        PT/INR - ( 18 Dec 2024 17:26 )   PT: 12.7 sec;   INR: 1.12 ratio         PTT - ( 18 Dec 2024 17:26 )  PTT:27.4 sec  Urinalysis Basic - ( 19 Dec 2024 07:12 )    Color: x / Appearance: x / SG: x / pH: x  Gluc: 193 mg/dL / Ketone: x  / Bili: x / Urobili: x   Blood: x / Protein: x / Nitrite: x   Leuk Esterase: x / RBC: x / WBC x   Sq Epi: x / Non Sq Epi: x / Bacteria: x      Procalcitonin: 2.14 ng/mL (12-18-24 @ 17:26)    Physical Examination:    General: No acute distress.      HEENT: Pupils equal, reactive to light.  Symmetric.    PULM: b/l expiratory wheeze     CVS: S1, S2    ABD: Soft, nondistended, nontender, normoactive bowel sounds, no masses    EXT: No edema, nontender    SKIN: Warm and well perfused, no rashes noted.    NEURO: Alert, oriented, interactive, nonfocal    RADIOLOGY REVIEWED  CXR: ? RLL infiltrate

## 2024-12-19 NOTE — PATIENT PROFILE ADULT - FALL HARM RISK - HARM RISK INTERVENTIONS

## 2024-12-19 NOTE — CONSULT NOTE ADULT - SUBJECTIVE AND OBJECTIVE BOX
HPI:  This is a 67 y/o male w/ PMHx multiple myeloma on Ninlaro, HFrEF (EF 47%) 2/2 NICM due to chemotherapy, hypothyroidism, and asthma who presents for fever and cough. His symptoms started 4 days ago, but got a lot worse last night, when he started developing fevers up to 102 at home and cough that is productive of yellow sputum. He went to his oncologist's (Dr. Agustin Block) office today, was found to be febrile in the office with wheezing and tachyonea. Had labs checked as well, and his CBC came back w/ a WBC of 25. He was told to go to the hospital.    In the ED, he was febrile to 102.6, tachycardic, hemodynamically stable (BPs borderline low), tachypneic and requiring 4LNC. CBC w/ WBC 18.44 and thrombocytopenia of 106. CMP w/ hypokalemia of 3.4 and mild CAITY w/ BUN/Cr 29/1.33. CXR w/ faint RLL opacity. RVP + for RSV. Given Vanc/Cefepime/azithromycin, IVNS 2.8L, Duonebs x3, mag sulfate and IV solumedrol 40mg in the ED.    (18 Dec 2024 20:17)      PAST MEDICAL & SURGICAL HISTORY:  Hypothyroidism      Multiple myeloma      Asthma      No significant past surgical history          Allergies    sulfa drugs (Unknown)    Intolerances        MEDICATIONS  (STANDING):  albuterol/ipratropium for Nebulization 3 milliLiter(s) Nebulizer every 6 hours  azithromycin  IVPB 500 milliGRAM(s) IV Intermittent every 24 hours  cefepime   IVPB 2000 milliGRAM(s) IV Intermittent every 8 hours  dapagliflozin 10 milliGRAM(s) Oral daily  enoxaparin Injectable 40 milliGRAM(s) SubCutaneous every 24 hours  famotidine    Tablet 40 milliGRAM(s) Oral daily  fluticasone propionate/ salmeterol 250-50 MICROgram(s) Diskus 1 Dose(s) Inhalation two times a day  levothyroxine 50 MICROGram(s) Oral daily  methylPREDNISolone sodium succinate Injectable 40 milliGRAM(s) IV Push two times a day  montelukast 10 milliGRAM(s) Oral daily  senna 2 Tablet(s) Oral at bedtime  tiotropium 2.5 MICROgram(s) Inhaler 2 Puff(s) Inhalation daily  vancomycin  IVPB 750 milliGRAM(s) IV Intermittent every 12 hours    MEDICATIONS  (PRN):  acetaminophen     Tablet .. 650 milliGRAM(s) Oral every 6 hours PRN Temp greater or equal to 38C (100.4F), Mild Pain (1 - 3)  melatonin 3 milliGRAM(s) Oral at bedtime PRN Insomnia      FAMILY HISTORY:  FHx: leukemia        SOCIAL HISTORY: No EtOH, no tobacco    REVIEW OF SYSTEMS:    +cough   +fever   +upper resp congestion     Height (cm): 162.6 (12-18 @ 16:16)  Weight (kg): 90.7 (12-18 @ 16:16)  BMI (kg/m2): 34.3 (12-18 @ 16:16)  BSA (m2): 1.96 (12-18 @ 16:16)    T(F): 97.6 (12-19-24 @ 01:23), Max: 102.6 (12-18-24 @ 16:16)  HR: 99 (12-19-24 @ 01:23)  BP: 106/72 (12-19-24 @ 01:23)  RR: 20 (12-19-24 @ 01:23)  SpO2: 96% (12-19-24 @ 01:23)  Wt(kg): --    GENERAL: NAD, well-developed  HEAD:  Atraumatic, Normocephalic  EYES: EOMI, PERRLA, conjunctiva and sclera clear  NECK: Supple, No JVD  CHEST/LUNG: +adventitious sounds RLL   HEART: Regular rate and rhythm; No murmurs, rubs, or gallops  ABDOMEN: Soft, Nontender, Nondistended; Bowel sounds present  EXTREMITIES:  2+ Peripheral Pulses, No clubbing, cyanosis, or edema  NEUROLOGY: non-focal  SKIN: No rashes or lesions                          12.4   18.44 )-----------( 106      ( 18 Dec 2024 17:26 )             41.6       12-18    137  |  102  |  29[H]  ----------------------------<  138[H]  3.4[L]   |  20[L]  |  1.33[H]    Ca    8.8      18 Dec 2024 17:26    TPro  6.8  /  Alb  3.7  /  TBili  0.5  /  DBili  x   /  AST  19  /  ALT  21  /  AlkPhos  74  12-18          PT/INR - ( 18 Dec 2024 17:26 )   PT: 12.7 sec;   INR: 1.12 ratio         PTT - ( 18 Dec 2024 17:26 )  PTT:27.4 sec

## 2024-12-19 NOTE — PROGRESS NOTE ADULT - SUBJECTIVE AND OBJECTIVE BOX
Name of Patient : CARMEN BERNSTEIN  MRN: [id number]      Subjective: Patient seen and examined. No new events except as noted.     REVIEW OF SYSTEMS:    CONSTITUTIONAL: No weakness, fevers or chills  EYES/ENT: No visual changes;  No vertigo or throat pain   NECK: No pain or stiffness  RESPIRATORY: No cough, wheezing, hemoptysis; No shortness of breath  CARDIOVASCULAR: No chest pain or palpitations  GASTROINTESTINAL: No abdominal or epigastric pain. No nausea, vomiting, or hematemesis; No diarrhea or constipation. No melena or hematochezia.  GENITOURINARY: No dysuria, frequency or hematuria  NEUROLOGICAL: No numbness or weakness  SKIN: No itching, burning, rashes, or lesions   All other review of systems is negative unless indicated above.    MEDICATIONS:  MEDICATIONS  (STANDING):  albuterol/ipratropium for Nebulization 3 milliLiter(s) Nebulizer every 6 hours  azithromycin  IVPB 500 milliGRAM(s) IV Intermittent every 24 hours  cefepime   IVPB 2000 milliGRAM(s) IV Intermittent every 12 hours  dapagliflozin 10 milliGRAM(s) Oral daily  enoxaparin Injectable 40 milliGRAM(s) SubCutaneous every 24 hours  famotidine    Tablet 40 milliGRAM(s) Oral daily  fluticasone propionate/ salmeterol 250-50 MICROgram(s) Diskus 1 Dose(s) Inhalation two times a day  guaiFENesin ER 1200 milliGRAM(s) Oral every 12 hours  levothyroxine 50 MICROGram(s) Oral daily  methylPREDNISolone sodium succinate Injectable 20 milliGRAM(s) IV Push every 8 hours  montelukast 10 milliGRAM(s) Oral daily  senna 2 Tablet(s) Oral at bedtime  tiotropium 2.5 MICROgram(s) Inhaler 2 Puff(s) Inhalation daily      PHYSICAL EXAM:  T(C): 36.4 (12-20-24 @ 00:00), Max: 36.5 (12-19-24 @ 07:35)  HR: 111 (12-20-24 @ 00:00) (90 - 111)  BP: 120/81 (12-20-24 @ 00:00) (101/67 - 120/81)  RR: 18 (12-20-24 @ 00:00) (18 - 20)  SpO2: 97% (12-20-24 @ 00:00) (96% - 99%)  Wt(kg): --  I&O's Summary    19 Dec 2024 07:01  -  20 Dec 2024 00:52  --------------------------------------------------------  IN: 240 mL / OUT: 0 mL / NET: 240 mL          Appearance: Normal	  HEENT:  PERRLA   Lymphatic: No lymphadenopathy   Cardiovascular: Normal S1 S2, no JVD  Respiratory: normal effort , clear  Gastrointestinal:  Soft, Non-tender  Skin: No rashes,  warm to touch  Psychiatry:  Mood & affect appropriate  Musculuskeletal: No edema    recent labs, Imaging and EKGs personally reviewed   CODE status discussed with the patient in detail    12-19-24 @ 07:01  -  12-20-24 @ 00:52  --------------------------------------------------------  IN: 240 mL / OUT: 0 mL / NET: 240 mL                          10.6   23.01 )-----------( 104      ( 19 Dec 2024 07:12 )             35.0               12-19    140  |  110[H]  |  24[H]  ----------------------------<  193[H]  3.7   |  17[L]  |  1.10    Ca    8.0[L]      19 Dec 2024 07:12    TPro  6.0  /  Alb  3.3  /  TBili  0.3  /  DBili  x   /  AST  13  /  ALT  17  /  AlkPhos  56  12-19    PT/INR - ( 18 Dec 2024 17:26 )   PT: 12.7 sec;   INR: 1.12 ratio         PTT - ( 18 Dec 2024 17:26 )  PTT:27.4 sec                   Urinalysis Basic - ( 19 Dec 2024 07:12 )    Color: x / Appearance: x / SG: x / pH: x  Gluc: 193 mg/dL / Ketone: x  / Bili: x / Urobili: x   Blood: x / Protein: x / Nitrite: x   Leuk Esterase: x / RBC: x / WBC x   Sq Epi: x / Non Sq Epi: x / Bacteria: x

## 2024-12-19 NOTE — CONSULT NOTE ADULT - SUBJECTIVE AND OBJECTIVE BOX
"HPI:  This is a 67 y/o male w/ PMHx multiple myeloma on Lanre, HFrEF (EF 47%) 2/2 NICM due to chemotherapy, hypothyroidism, and asthma who presents for fever and cough. His symptoms started 4 days ago, but got a lot worse last night, when he started developing fevers up to 102 at home and cough that is productive of yellow sputum. He went to his oncologist's (Dr. Agustin Block) office today, was found to be febrile in the office with wheezing and tachyonea. Had labs checked as well, and his CBC came back w/ a WBC of 25. He was told to go to the hospital.    In the ED, he was febrile to 102.6, tachycardic, hemodynamically stable (BPs borderline low), tachypneic and requiring 4LNC. CBC w/ WBC 18.44 and thrombocytopenia of 106. CMP w/ hypokalemia of 3.4 and mild CAITY w/ BUN/Cr 29/1.33. CXR w/ faint RLL opacity. RVP + for RSV. Given Vanc/Cefepime/azithromycin, IVNS 2.8L, Duonebs x3, mag sulfate and IV solumedrol 40mg in the ED.    (18 Dec 2024 20:17)"    Above reviewed. 69 yo M MM Lanre, with fever/cough  Cough/Fevers  Present to hospital for shortness of breath  Leukocytosis in office as well  Here, generally improved  ID called for further eval    PAST MEDICAL & SURGICAL HISTORY:  Hypothyroidism    Multiple myeloma    Asthma    No significant past surgical history    Allergies    sulfa drugs (Unknown)    Intolerances    ANTIMICROBIALS:  azithromycin  IVPB 500 every 24 hours  cefepime   IVPB 2000 every 8 hours  vancomycin  IVPB 750 every 12 hours    OTHER MEDS:  acetaminophen     Tablet .. 650 milliGRAM(s) Oral every 6 hours PRN  albuterol/ipratropium for Nebulization 3 milliLiter(s) Nebulizer every 6 hours  dapagliflozin 10 milliGRAM(s) Oral daily  enoxaparin Injectable 40 milliGRAM(s) SubCutaneous every 24 hours  famotidine    Tablet 40 milliGRAM(s) Oral daily  fluticasone propionate/ salmeterol 250-50 MICROgram(s) Diskus 1 Dose(s) Inhalation two times a day  guaiFENesin ER 1200 milliGRAM(s) Oral every 12 hours  levothyroxine 50 MICROGram(s) Oral daily  melatonin 3 milliGRAM(s) Oral at bedtime PRN  methylPREDNISolone sodium succinate Injectable 20 milliGRAM(s) IV Push every 8 hours  montelukast 10 milliGRAM(s) Oral daily  senna 2 Tablet(s) Oral at bedtime  tiotropium 2.5 MICROgram(s) Inhaler 2 Puff(s) Inhalation daily    SOCIAL HISTORY: No tobacco, no alcohol, no illicit drugs    FAMILY HISTORY:  FHx: leukemia    Drug Dosing Weight  Height (cm): 162.6 (18 Dec 2024 16:16)  Weight (kg): 90.7 (18 Dec 2024 16:16)  BMI (kg/m2): 34.3 (18 Dec 2024 16:16)  BSA (m2): 1.96 (18 Dec 2024 16:16)    PE:    Vital Signs Last 24 Hrs  T(C): 36.5 (19 Dec 2024 07:35), Max: 39.2 (18 Dec 2024 16:16)  T(F): 97.7 (19 Dec 2024 07:35), Max: 102.6 (18 Dec 2024 16:16)  HR: 90 (19 Dec 2024 07:35) (90 - 123)  BP: 101/67 (19 Dec 2024 07:35) (90/57 - 125/70)  BP(mean): 84 (18 Dec 2024 20:44) (75 - 84)  RR: 20 (19 Dec 2024 07:35) (20 - 22)  SpO2: 98% (19 Dec 2024 07:35) (95% - 99%)    Gen: AOx3, NAD, non-toxic, pleasant  CV: S1+S2 normal, nontachycardic  Resp: Clear bilat, no resp distress, no crackles/wheezes  Abd: Soft, nontender, +BS  Ext: No LE edema, no wounds  : No Hill  IV/Skin: No thrombophlebitis  Msk: No low back pain, no arthralgias, no joint swelling  Neuro: No sensory deficits, no motor deficits    LABS:                        10.6   23.01 )-----------( 104      ( 19 Dec 2024 07:12 )             35.0     12-19    140  |  110[H]  |  24[H]  ----------------------------<  193[H]  3.7   |  17[L]  |  1.10    Ca    8.0[L]      19 Dec 2024 07:12    TPro  6.0  /  Alb  3.3  /  TBili  0.3  /  DBili  x   /  AST  13  /  ALT  17  /  AlkPhos  56  12-19    Urinalysis Basic - ( 19 Dec 2024 07:12 )    Color: x / Appearance: x / SG: x / pH: x  Gluc: 193 mg/dL / Ketone: x  / Bili: x / Urobili: x   Blood: x / Protein: x / Nitrite: x   Leuk Esterase: x / RBC: x / WBC x   Sq Epi: x / Non Sq Epi: x / Bacteria: x    MICROBIOLOGY:        RADIOLOGY:     12/18 XR:    IMPRESSION:    Right lower lobe patchy opacity, which may represent pneumonia in the   appropriate clinical setting.

## 2024-12-19 NOTE — PHYSICAL THERAPY INITIAL EVALUATION ADULT - RANGE OF MOTION EXAMINATION, REHAB EVAL
ATYPICAL AS MIN LEAK ON FA BUT SIG SRF - TO CONTINUE WITH ANTI-VEGF AS PT SEEMS TO BE RESPONDING. no ROM deficits were identified

## 2024-12-19 NOTE — PHYSICAL THERAPY INITIAL EVALUATION ADULT - PERTINENT HX OF CURRENT PROBLEM, REHAB EVAL
Pt is a 69 y/o male adm to [facility] on 12/18/24 w/ PMHx multiple myeloma on Ninlaro, HFrEF (EF 47%) 2/2 NICM due to chemotherapy, hypothyroidism, and asthma who presents for fever and cough. His symptoms started 4 days ago, but got a lot worse last night, when he started developing fevers up to 102 at home and cough that is productive of yellow sputum. He went to his oncologist's (Dr. Agustin Block) office today, was found to be febrile in the office with wheezing and tachyonea. Had labs checked as well, and his CBC came back w/ a WBC of 25. He was told to go to the hospital.  In the ED, he was febrile to 102.6, tachycardic, hemodynamically stable (BPs borderline low), tachypneic and requiring 4LNC. CBC w/ WBC 18.44 and thrombocytopenia of 106. CMP w/ hypokalemia of 3.4 and mild CAITY w/ BUN/Cr 29/1.33. CXR w/ faint RLL opacity. RVP + for RSV. Given Vanc/Cefepime/azithromycin, IVNS 2.8L, Duonebs x3, mag sulfate and IV solumedrol 40mg in the ED.

## 2024-12-19 NOTE — CONSULT NOTE ADULT - SUBJECTIVE AND OBJECTIVE BOX
DATE OF SERVICE: 12-19-24 @ 17:39    CHIEF COMPLAINT:Patient is a 68y old  Male who presents with a chief complaint of AHRF, PNA (19 Dec 2024 14:13)      HISTORY OF PRESENT ILLNESS:  This is a 69 y/o male w/ PMHx multiple myeloma on Ninlaro, HFrEF (EF 47%) 2/2 NICM due to chemotherapy, hypothyroidism, and asthma who presents for fever and cough. His symptoms started 4 days ago, but got a lot worse last night, when he started developing fevers up to 102 at home and cough that is productive of yellow sputum. He went to his oncologist's (Dr. Agustin Block) office today, was found to be febrile in the office with wheezing and tachyonea. Had labs checked as well, and his CBC came back w/ a WBC of 25. He was told to go to the hospital.    In the ED, he was febrile to 102.6, tachycardic, hemodynamically stable (BPs borderline low), tachypneic and requiring 4LNC. CBC w/ WBC 18.44 and thrombocytopenia of 106. CMP w/ hypokalemia of 3.4 and mild CAITY w/ BUN/Cr 29/1.33. CXR w/ faint RLL opacity. RVP + for RSV. Given Vanc/Cefepime/azithromycin, IVNS 2.8L, Duonebs x3, mag sulfate and IV solumedrol 40mg in the ED.    (18 Dec 2024 20:17)      PAST MEDICAL & SURGICAL HISTORY:  Hypothyroidism      Multiple myeloma      Asthma      No significant past surgical history              MEDICATIONS:  enoxaparin Injectable 40 milliGRAM(s) SubCutaneous every 24 hours    azithromycin  IVPB 500 milliGRAM(s) IV Intermittent every 24 hours    albuterol/ipratropium for Nebulization 3 milliLiter(s) Nebulizer every 6 hours  fluticasone propionate/ salmeterol 250-50 MICROgram(s) Diskus 1 Dose(s) Inhalation two times a day  guaiFENesin ER 1200 milliGRAM(s) Oral every 12 hours  montelukast 10 milliGRAM(s) Oral daily  tiotropium 2.5 MICROgram(s) Inhaler 2 Puff(s) Inhalation daily    acetaminophen     Tablet .. 650 milliGRAM(s) Oral every 6 hours PRN  melatonin 3 milliGRAM(s) Oral at bedtime PRN    famotidine    Tablet 40 milliGRAM(s) Oral daily  senna 2 Tablet(s) Oral at bedtime    dapagliflozin 10 milliGRAM(s) Oral daily  levothyroxine 50 MICROGram(s) Oral daily  methylPREDNISolone sodium succinate Injectable 20 milliGRAM(s) IV Push every 8 hours        FAMILY HISTORY:  FHx: leukemia        Non-contributory    SOCIAL HISTORY:    [ ] Tobacco  [ ] Drugs  [ ] Alcohol    Allergies    sulfa drugs (Unknown)    Intolerances    	    REVIEW OF SYSTEMS:  CONSTITUTIONAL: No fever  EYES: No eye pain, visual disturbances, or discharge  ENMT:  No difficulty hearing, tinnitus  NECK: No pain or stiffness  RESPIRATORY: No cough, wheezing,  CARDIOVASCULAR: No chest pain, palpitations, passing out, dizziness, or leg swelling  GASTROINTESTINAL:  No nausea, vomiting, diarrhea or constipation. No melena.  GENITOURINARY: No dysuria, hematuria  NEUROLOGICAL: No stroke like symptoms  SKIN: No burning or lesions   ENDOCRINE: No heat or cold intolerance  MUSCULOSKELETAL: No joint pain or swelling  PSYCHIATRIC: No  anxiety, mood swings  HEME/LYMPH: No bleeding gums  ALLERGY AND IMMUNOLOGIC: No hives or eczema	    All other ROS negative    PHYSICAL EXAM:  T(C): 36.4 (12-19-24 @ 16:10), Max: 36.8 (12-18-24 @ 19:44)  HR: 90 (12-19-24 @ 16:10) (90 - 112)  BP: 106/70 (12-19-24 @ 16:10) (95/67 - 125/70)  RR: 18 (12-19-24 @ 16:10) (18 - 20)  SpO2: 99% (12-19-24 @ 16:10) (96% - 99%)  Wt(kg): --  I&O's Summary      Appearance: Normal	  HEENT:   Normal oral mucosa, EOMI	  Cardiovascular:  S1 S2, No JVD,    Respiratory: Lungs clear to auscultation	  Psychiatry: Alert  Gastrointestinal:  Soft, Non-tender, + BS	  Skin: No rashes   Neurologic: Non-focal  Extremities:  No edema  Vascular: Peripheral pulses palpable    	    	  	  CARDIAC MARKERS:  Labs personally reviewed by me                                  10.6   23.01 )-----------( 104      ( 19 Dec 2024 07:12 )             35.0     12-19    140  |  110[H]  |  24[H]  ----------------------------<  193[H]  3.7   |  17[L]  |  1.10    Ca    8.0[L]      19 Dec 2024 07:12    TPro  6.0  /  Alb  3.3  /  TBili  0.3  /  DBili  x   /  AST  13  /  ALT  17  /  AlkPhos  56  12-19          EKG: Personally reviewed by me -   Radiology: Personally reviewed by me -   < from: Xray Chest 1 View- PORTABLE-Urgent (12.18.24 @ 18:11) >  Right lower lobe patchy opacity, which may represent pneumonia in the   appropriate clinical setting.    < end of copied text >  < from: TTE Limited W or WO Ultrasound Enhancing Agent (12.07.23 @ 12:59) >   1. Left ventricular systolic function is normal with an ejection fraction of 51 % by Frances's method of disks.   2. No pericardial effusion seen.  3. Compared to the transthoracic echocardiogram performed on 10/13/2023 Low nornal EF/.   4. There is no evidence of a left ventricular thrombus.    < end of copied text >      Assessment /Plan:     This is a 69 y/o male w/ PMHx multiple myeloma on Ninlaro, HFrEF (EF 47%) 2/2 NICM due to chemotherapy, hypothyroidism, and asthma who presents for fever and cough. His symptoms started 4 days ago, but got a lot worse last night, when he started developing fevers up to 102 at home and cough that is productive of yellow sputum. He went to his oncologist's (Dr. Agustin Block) office today, was found to be febrile in the office with wheezing and tachyonea. Had labs checked as well, and his CBC came back w/ a WBC of 25. He was told to go to the hospital.    1. Shortness of Breath  - CXR suggestive of PNA  - WBC elevated  - RVP + RSV  - Hx of sHF with most recent TTE showing EF 51%  - Appears euvolemic. Shortness of breath 2/2 Viral PNA  - c/w IV Abx, IV steroids, supportive care    2. Chronic Systolic HF  - Prior diagnostic cath 2023 with no CAD therefore non ischemic cardiomyopathy. Likely 2/2 chemo treatments.  - Continue Farxiga 10mg PO daily   - Appears euvolemic    3. DVT Ppx  - c/w Lovenox 40mg SQ daily    4. Hypothyroidism  - c/w Synthroid    Differential diagnosis and plan of care discussed with patient after the evaluation. Counseling on diet, nutritional counseling, weight management, exercise and medication compliance was done.   Advanced care planning/advanced directives discussed with patient/family. DNR status including forceful chest compressions to attempt to restart the heart, ventilator support/artificial breathing, electric shock, artificial nutrition, health care proxy, Molst form all discussed with pt. Pt wishes to consider. More than fifteen minutes spent on discussing advanced directives.       Hema Lane DO [facility]  Cardiovascular Medicine  [address] [place] [address] Dr, [address]  Available for call or text via [employer] TE[employer]  Office [phone]   DATE OF SERVICE: 12-19-24 @ 17:39    CHIEF COMPLAINT:Patient is a 68y old  Male who presents with a chief complaint of AHRF, PNA (19 Dec 2024 14:13)      HISTORY OF PRESENT ILLNESS:  This is a 67 y/o male w/ PMHx multiple myeloma on Ninlaro, HFrEF (EF 47%) 2/2 NICM due to chemotherapy, hypothyroidism, and asthma who presents for fever and cough. His symptoms started 4 days ago, but got a lot worse last night, when he started developing fevers up to 102 at home and cough that is productive of yellow sputum. He went to his oncologist's (Dr. Agustin Block) office today, was found to be febrile in the office with wheezing and tachyonea. Had labs checked as well, and his CBC came back w/ a WBC of 25. He was told to go to the hospital.    In the ED, he was febrile to 102.6, tachycardic, hemodynamically stable (BPs borderline low), tachypneic and requiring 4LNC. CBC w/ WBC 18.44 and thrombocytopenia of 106. CMP w/ hypokalemia of 3.4 and mild CAITY w/ BUN/Cr 29/1.33. CXR w/ faint RLL opacity. RVP + for RSV. Given Vanc/Cefepime/azithromycin, IVNS 2.8L, Duonebs x3, mag sulfate and IV solumedrol 40mg in the ED.    (18 Dec 2024 20:17)      PAST MEDICAL & SURGICAL HISTORY:  Hypothyroidism      Multiple myeloma      Asthma      No significant past surgical history              MEDICATIONS:  enoxaparin Injectable 40 milliGRAM(s) SubCutaneous every 24 hours    azithromycin  IVPB 500 milliGRAM(s) IV Intermittent every 24 hours    albuterol/ipratropium for Nebulization 3 milliLiter(s) Nebulizer every 6 hours  fluticasone propionate/ salmeterol 250-50 MICROgram(s) Diskus 1 Dose(s) Inhalation two times a day  guaiFENesin ER 1200 milliGRAM(s) Oral every 12 hours  montelukast 10 milliGRAM(s) Oral daily  tiotropium 2.5 MICROgram(s) Inhaler 2 Puff(s) Inhalation daily    acetaminophen     Tablet .. 650 milliGRAM(s) Oral every 6 hours PRN  melatonin 3 milliGRAM(s) Oral at bedtime PRN    famotidine    Tablet 40 milliGRAM(s) Oral daily  senna 2 Tablet(s) Oral at bedtime    dapagliflozin 10 milliGRAM(s) Oral daily  levothyroxine 50 MICROGram(s) Oral daily  methylPREDNISolone sodium succinate Injectable 20 milliGRAM(s) IV Push every 8 hours        FAMILY HISTORY:  FHx: leukemia        Non-contributory    SOCIAL HISTORY:    [ ] not a smoker    Allergies    sulfa drugs (Unknown)    Intolerances    	    REVIEW OF SYSTEMS:  CONSTITUTIONAL: No fever  EYES: No eye pain, visual disturbances, or discharge  ENMT:  No difficulty hearing, tinnitus  NECK: No pain or stiffness  RESPIRATORY: No cough, wheezing,  CARDIOVASCULAR: No chest pain, palpitations, passing out, dizziness, or leg swelling  GASTROINTESTINAL:  No nausea, vomiting, diarrhea or constipation. No melena.  GENITOURINARY: No dysuria, hematuria  NEUROLOGICAL: No stroke like symptoms  SKIN: No burning or lesions   ENDOCRINE: No heat or cold intolerance  MUSCULOSKELETAL: No joint pain or swelling  PSYCHIATRIC: No  anxiety, mood swings  HEME/LYMPH: No bleeding gums  ALLERGY AND IMMUNOLOGIC: No hives or eczema	    All other ROS negative    PHYSICAL EXAM:  T(C): 36.4 (12-19-24 @ 16:10), Max: 36.8 (12-18-24 @ 19:44)  HR: 90 (12-19-24 @ 16:10) (90 - 112)  BP: 106/70 (12-19-24 @ 16:10) (95/67 - 125/70)  RR: 18 (12-19-24 @ 16:10) (18 - 20)  SpO2: 99% (12-19-24 @ 16:10) (96% - 99%)  Wt(kg): --  I&O's Summary      Appearance: Normal	  HEENT:   Normal oral mucosa, EOMI	  Cardiovascular:  S1 S2, No JVD,    Respiratory: Lungs clear to auscultation	  Psychiatry: Alert  Gastrointestinal:  Soft, Non-tender, + BS	  Skin: No rashes   Neurologic: Non-focal  Extremities:  No edema  Vascular: Peripheral pulses palpable    	    	  	  CARDIAC MARKERS:  Labs personally reviewed by me                                  10.6   23.01 )-----------( 104      ( 19 Dec 2024 07:12 )             35.0     12-19    140  |  110[H]  |  24[H]  ----------------------------<  193[H]  3.7   |  17[L]  |  1.10    Ca    8.0[L]      19 Dec 2024 07:12    TPro  6.0  /  Alb  3.3  /  TBili  0.3  /  DBili  x   /  AST  13  /  ALT  17  /  AlkPhos  56  12-19          EKG: Personally reviewed by me -   Radiology: Personally reviewed by me -   < from: Xray Chest 1 View- PORTABLE-Urgent (12.18.24 @ 18:11) >  Right lower lobe patchy opacity, which may represent pneumonia in the   appropriate clinical setting.    < end of copied text >  < from: TTE Limited W or WO Ultrasound Enhancing Agent (12.07.23 @ 12:59) >   1. Left ventricular systolic function is normal with an ejection fraction of 51 % by Frances's method of disks.   2. No pericardial effusion seen.  3. Compared to the transthoracic echocardiogram performed on 10/13/2023 Low nornal EF/.   4. There is no evidence of a left ventricular thrombus.    < end of copied text >          Assessment /Plan:     This is a 67 y/o male w/ PMHx multiple myeloma on [facility] [place], HFrEF (EF 47%) 2/2 NICM due to chemotherapy, hypothyroidism, and asthma who presents for fever and cough. His symptoms started 4 days ago, but got a lot worse last night, when he started developing fevers up to 102 at home and cough that is productive of yellow sputum. He went to his oncologist's (Dr. Agustin Block) office today, was found to be febrile in the office with wheezing and tachyonea. Had labs checked as well, and his CBC came back w/ a WBC of 25. He was told to go to the hospital.    1. Shortness of Breath  - CXR suggestive of PNA  - WBC elevated  - + RSV  - Hx of sHF with most recent TTE showing EF 51%  - Appears euvolemic. Shortness of breath 2/2 Viral PNA  - c/w IV Abx, IV steroids, supportive care    2. Chronic Systolic HF  - Prior diagnostic cath 2023 with no CAD therefore non ischemic cardiomyopathy. 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  - Continue Farxiga 10mg PO daily   - Appears euvolemic    3. DVT Ppx  - c/w Lovenox 40mg SQ daily    4. Hypothyroidism  - c/w Synthroid          Differential diagnosis and plan of care discussed with patient after the evaluation. Counseling on diet, nutritional counseling, weight management, exercise and medication compliance was done.   Advanced care planning/advanced directives discussed with patient/family. DNR status including forceful chest compressions to attempt to restart the heart, ventilator support/artificial breathing, electric shock, artificial nutrition, health care proxy, Molst form all discussed with pt. Pt wishes to consider. More than fifteen minutes spent on discussing advanced directives.       Hema Lane DO [facility]  Cardiovascular Medicine  [address] [place] [address] Dr, [address]  Available for call or text via [employer] TEAM[employer]  Office [phone]

## 2024-12-19 NOTE — CONSULT NOTE ADULT - NS ATTEND AMEND GEN_ALL_CORE FT
Patient care and plan discussed and reviewed with Advanced Care Provider. Plan as outlined above edited by me to reflect our discussion.
-Continue Duoneb q6h  -Continue Advair BID  -Continue Singulair  -Mucinex 1200 mg PO BID x 5 days   -F/u CT chest  -Solumedrol 20 mg IVP q8h

## 2024-12-19 NOTE — PHYSICAL THERAPY INITIAL EVALUATION ADULT - ADDITIONAL COMMENTS
Pt lives in pvt house with 2 steps to enter, 1 flt to basement bedroom. No assistive device used for mobility. Independent in all ADL's and amb.   Lives with spouse, and dtr's family(son-in-law and grand children). Family able to assist as needed.

## 2024-12-19 NOTE — PATIENT PROFILE ADULT - NSPROGENOTHERPROVIDER_GEN_A_NUR
Photo Preface (Leave Blank If You Do Not Want): Photographs were obtained today Detail Level: Zone none

## 2024-12-19 NOTE — CONSULT NOTE ADULT - ASSESSMENT
Overall, PNA, RSV, Leukocytosis  - Cefepime 2g q 12 (can decrease dose)  - Azithro 500mg q 24  - DC Vanco  - F/U BCXs  - F/U Legionella urine  - If not improving, check CT Chest (rule out more deeply seated process/parapneumonic effusion)  - Supportive care for RSV      Data/Rationale:  69 yo M MM Premalapreet, with fever/cough  Fever, leukocytosis  Fever, cough, viral episode as outpatient, then leukocytosis  RSV+  CXR RLL opacity  UA negative, UCX pending  BCXs pending  MRSA/MSSA negative  Suspected Bacterial PNA superimposed on Viral process    Finesse Esqueda MD  Contact on TEAMS messaging from 9am - 5pm  From 5pm-9am, on weekends, or if no response call [phone]
67 y/o M with PMH of multiple myeloma on Ninlaro, HFrEF (EF 47%) 2/2 NICM due to chemotherapy, hypothyroidism, and asthma who presents for fever and cough. His symptoms started 4 days ago, but got a lot worse last night, when he started developing fevers up to 102F at home and cough that is productive of yellow sputum. He went to his oncologist's (Dr. Agustin Block) office, was found to be febrile with wheezing and tachypnea. In the ED, he was febrile to 102.6, tachycardic, tachypneic and requiring 4LNC. CXR with RLL opacity. Given Vanc/Cefepime/azithromycin. S/p Duonebs x3, mag sulfate and IV solumedrol 40mg in the ED. RVP + RSV.   
67 y/o male w/ PMHx multiple myeloma on Ninlaro, HFrEF (EF 47%) 2/2 NICM due to chemotherapy, hypothyroidism, and asthma who presents for fever and cough. His symptoms started 4 days ago, but got a lot worse last night, when he started developing fevers up to 102 at home and cough that is productive of yellow sputum.  Admitted for Pneumonia, RLL and RSV+     1. IgA Lambda Multiple Myeloma s/p ASCT in [facility] Dec 2021   - Day +100 assessment 10/2022in [facility] (RONNI negative, SFLC­ normal)  - was on velcade maintenance due to revlimid intolerance   - POD 10/3/23 IgA and Lambda >25% increase which DKd was started. Unfortunately after first dose of kyprolis acute systolic heart failure requiring hospitalization   - Continued on darzalex and decadron but started on pomalyst which did not tolerate despite dose reductions (primarily significant fatigue impacting QoL)   - Ninlaro started in combination with Darzalex- dose reduction required due to thrombocytopenia which he has been tolerating since   - He is currently on his off week of Ninlaro which he takes weekly x3, off x1 week   - last darzalex on 12/5/24  - no plans for inpatient treatment of his multiple myeloma   - follow up outpatient with Dr Block at [facility] after discharge     2. RSV Pneumonia   - presented to clinic on 12/18 with cough, fever, leukocytosis and adventitious sounds on respiratory exam concerning for pneumonia- sent to the ER   - CXR right lower lobe opacity   - RVP positive for RSV   - continue with antibiotics especially in the setting of myleoma on treatment   - history of recurrent respiratory infections, IgG has not been low enough to give infusion however please send quantitative IgGs to assess if any benefit to IVIG     Agustin Block MD   [facility]   Hematology/Oncology   [phone]

## 2024-12-19 NOTE — CONSULT NOTE ADULT - PROBLEM SELECTOR RECOMMENDATION 4
-RVP + RSV  -CXR with ? RLL infiltrate  -Trend leukocytosis/fever curve  -F/u procalcitonin  -Continue ABX  -Check CT chest.

## 2024-12-20 DIAGNOSIS — J18.9 PNEUMONIA, UNSPECIFIED ORGANISM: ICD-10-CM

## 2024-12-20 LAB
ANION GAP SERPL CALC-SCNC: 12 MMOL/L — SIGNIFICANT CHANGE UP (ref 5–17)
BUN SERPL-MCNC: 23 MG/DL — SIGNIFICANT CHANGE UP (ref 7–23)
CALCIUM SERPL-MCNC: 8.8 MG/DL — SIGNIFICANT CHANGE UP (ref 8.4–10.5)
CHLORIDE SERPL-SCNC: 107 MMOL/L — SIGNIFICANT CHANGE UP (ref 96–108)
CO2 SERPL-SCNC: 19 MMOL/L — LOW (ref 22–31)
CREAT SERPL-MCNC: 0.93 MG/DL — SIGNIFICANT CHANGE UP (ref 0.5–1.3)
EGFR: 89 ML/MIN/1.73M2 — SIGNIFICANT CHANGE UP
GLUCOSE SERPL-MCNC: 194 MG/DL — HIGH (ref 70–99)
HCT VFR BLD CALC: 33.5 % — LOW (ref 39–50)
HGB BLD-MCNC: 9.9 G/DL — LOW (ref 13–17)
MCHC RBC-ENTMCNC: 27.2 PG — SIGNIFICANT CHANGE UP (ref 27–34)
MCHC RBC-ENTMCNC: 29.6 G/DL — LOW (ref 32–36)
MCV RBC AUTO: 92 FL — SIGNIFICANT CHANGE UP (ref 80–100)
NRBC # BLD: 0 /100 WBCS — SIGNIFICANT CHANGE UP (ref 0–0)
PLATELET # BLD AUTO: 104 K/UL — LOW (ref 150–400)
POTASSIUM SERPL-MCNC: 4.4 MMOL/L — SIGNIFICANT CHANGE UP (ref 3.5–5.3)
POTASSIUM SERPL-SCNC: 4.4 MMOL/L — SIGNIFICANT CHANGE UP (ref 3.5–5.3)
RBC # BLD: 3.64 M/UL — LOW (ref 4.2–5.8)
RBC # FLD: 17.3 % — HIGH (ref 10.3–14.5)
SODIUM SERPL-SCNC: 138 MMOL/L — SIGNIFICANT CHANGE UP (ref 135–145)
WBC # BLD: 17.81 K/UL — HIGH (ref 3.8–10.5)
WBC # FLD AUTO: 17.81 K/UL — HIGH (ref 3.8–10.5)

## 2024-12-20 PROCEDURE — 71250 CT THORAX DX C-: CPT | Mod: 26

## 2024-12-20 PROCEDURE — 99232 SBSQ HOSP IP/OBS MODERATE 35: CPT

## 2024-12-20 RX ORDER — CHLORHEXIDINE GLUCONATE 1.2 MG/ML
1 RINSE ORAL DAILY
Refills: 0 | Status: DISCONTINUED | OUTPATIENT
Start: 2024-12-20 | End: 2024-12-23

## 2024-12-20 RX ADMIN — Medication 1200 MILLIGRAM(S): at 18:42

## 2024-12-20 RX ADMIN — IPRATROPIUM BROMIDE AND ALBUTEROL SULFATE 3 MILLILITER(S): .5; 2.5 SOLUTION RESPIRATORY (INHALATION) at 23:09

## 2024-12-20 RX ADMIN — ENOXAPARIN SODIUM 40 MILLIGRAM(S): 60 INJECTION INTRAVENOUS; SUBCUTANEOUS at 21:03

## 2024-12-20 RX ADMIN — CHLORHEXIDINE GLUCONATE 1 APPLICATION(S): 1.2 RINSE ORAL at 13:00

## 2024-12-20 RX ADMIN — Medication 100 MILLIGRAM(S): at 18:43

## 2024-12-20 RX ADMIN — FLUTICASONE PROPIONATE AND SALMETEROL 1 DOSE(S): 50; 500 POWDER ORAL; RESPIRATORY (INHALATION) at 05:32

## 2024-12-20 RX ADMIN — IPRATROPIUM BROMIDE AND ALBUTEROL SULFATE 3 MILLILITER(S): .5; 2.5 SOLUTION RESPIRATORY (INHALATION) at 05:32

## 2024-12-20 RX ADMIN — AZITHROMYCIN MONOHYDRATE 255 MILLIGRAM(S): 200 POWDER, FOR SUSPENSION ORAL at 04:01

## 2024-12-20 RX ADMIN — TIOTROPIUM BROMIDE MONOHYDRATE 2 PUFF(S): 18 CAPSULE ORAL; RESPIRATORY (INHALATION) at 12:58

## 2024-12-20 RX ADMIN — Medication 1200 MILLIGRAM(S): at 05:32

## 2024-12-20 RX ADMIN — FAMOTIDINE 40 MILLIGRAM(S): 20 TABLET, FILM COATED ORAL at 12:57

## 2024-12-20 RX ADMIN — LEVOTHYROXINE SODIUM 50 MICROGRAM(S): 175 TABLET ORAL at 05:32

## 2024-12-20 RX ADMIN — IPRATROPIUM BROMIDE AND ALBUTEROL SULFATE 3 MILLILITER(S): .5; 2.5 SOLUTION RESPIRATORY (INHALATION) at 12:57

## 2024-12-20 RX ADMIN — FLUTICASONE PROPIONATE AND SALMETEROL 1 DOSE(S): 50; 500 POWDER ORAL; RESPIRATORY (INHALATION) at 18:42

## 2024-12-20 RX ADMIN — METHYLPREDNISOLONE 20 MILLIGRAM(S): 4 TABLET ORAL at 21:03

## 2024-12-20 RX ADMIN — SENNOSIDES 2 TABLET(S): 8.6 TABLET, FILM COATED ORAL at 21:03

## 2024-12-20 RX ADMIN — IPRATROPIUM BROMIDE AND ALBUTEROL SULFATE 3 MILLILITER(S): .5; 2.5 SOLUTION RESPIRATORY (INHALATION) at 18:42

## 2024-12-20 RX ADMIN — Medication 100 MILLIGRAM(S): at 05:32

## 2024-12-20 RX ADMIN — METHYLPREDNISOLONE 20 MILLIGRAM(S): 4 TABLET ORAL at 14:30

## 2024-12-20 RX ADMIN — METHYLPREDNISOLONE 20 MILLIGRAM(S): 4 TABLET ORAL at 05:32

## 2024-12-20 RX ADMIN — DAPAGLIFLOZIN 10 MILLIGRAM(S): 5 TABLET, FILM COATED ORAL at 12:57

## 2024-12-20 RX ADMIN — MONTELUKAST SODIUM 10 MILLIGRAM(S): 10 TABLET, FILM COATED ORAL at 12:57

## 2024-12-20 NOTE — PROGRESS NOTE ADULT - SUBJECTIVE AND OBJECTIVE BOX
Follow-up Pulm Progress Note    O2 sats 97% on 3LNC, lowered to 2LNC now  +cough, wheezing but slowly improving   Afebrile overnight  Denies CP, SOB at rest  +VELAZCO     Medications:  MEDICATIONS  (STANDING):  albuterol/ipratropium for Nebulization 3 milliLiter(s) Nebulizer every 6 hours  azithromycin  IVPB 500 milliGRAM(s) IV Intermittent every 24 hours  cefepime   IVPB 2000 milliGRAM(s) IV Intermittent every 12 hours  chlorhexidine 2% Cloths 1 Application(s) Topical daily  dapagliflozin 10 milliGRAM(s) Oral daily  enoxaparin Injectable 40 milliGRAM(s) SubCutaneous every 24 hours  famotidine    Tablet 40 milliGRAM(s) Oral daily  fluticasone propionate/ salmeterol 250-50 MICROgram(s) Diskus 1 Dose(s) Inhalation two times a day  guaiFENesin ER 1200 milliGRAM(s) Oral every 12 hours  levothyroxine 50 MICROGram(s) Oral daily  methylPREDNISolone sodium succinate Injectable 20 milliGRAM(s) IV Push every 8 hours  montelukast 10 milliGRAM(s) Oral daily  senna 2 Tablet(s) Oral at bedtime  tiotropium 2.5 MICROgram(s) Inhaler 2 Puff(s) Inhalation daily    MEDICATIONS  (PRN):  acetaminophen     Tablet .. 650 milliGRAM(s) Oral every 6 hours PRN Temp greater or equal to 38C (100.4F), Mild Pain (1 - 3)  melatonin 3 milliGRAM(s) Oral at bedtime PRN Insomnia          Vital Signs Last 24 Hrs  T(C): 37.1 (20 Dec 2024 04:41), Max: 37.1 (20 Dec 2024 04:41)  T(F): 98.8 (20 Dec 2024 04:41), Max: 98.8 (20 Dec 2024 04:41)  HR: 99 (20 Dec 2024 04:41) (90 - 111)  BP: 112/76 (20 Dec 2024 04:41) (106/70 - 120/81)  BP(mean): --  RR: 18 (20 Dec 2024 08:58) (18 - 20)  SpO2: 99% (20 Dec 2024 08:58) (95% - 99%)    Parameters below as of 20 Dec 2024 08:58  Patient On (Oxygen Delivery Method): nasal cannula  O2 Flow (L/min): 2        VBG pH 7.34 12-18 @ 17:24    VBG pCO2 44 12-18 @ 17:24    VBG O2 sat 50.3 12-18 @ 17:24    VBG lactate 2.5 12-18 @ 17:24      12-19 @ 07:01  -  12-20 @ 07:00  --------------------------------------------------------  IN: 240 mL / OUT: 0 mL / NET: 240 mL          LABS:                        9.9    17.81 )-----------( 104      ( 20 Dec 2024 07:02 )             33.5     12-20    138  |  107  |  23  ----------------------------<  194[H]  4.4   |  19[L]  |  0.93    Ca    8.8      20 Dec 2024 07:02    TPro  6.0  /  Alb  3.3  /  TBili  0.3  /  DBili  x   /  AST  13  /  ALT  17  /  AlkPhos  56  12-19          CAPILLARY BLOOD GLUCOSE        PT/INR - ( 18 Dec 2024 17:26 )   PT: 12.7 sec;   INR: 1.12 ratio         PTT - ( 18 Dec 2024 17:26 )  PTT:27.4 sec  Urinalysis Basic - ( 20 Dec 2024 07:02 )    Color: x / Appearance: x / SG: x / pH: x  Gluc: 194 mg/dL / Ketone: x  / Bili: x / Urobili: x   Blood: x / Protein: x / Nitrite: x   Leuk Esterase: x / RBC: x / WBC x   Sq Epi: x / Non Sq Epi: x / Bacteria: x      Procalcitonin: 2.14 ng/mL (12-18-24 @ 17:26)                  CULTURES: (if applicable)    Culture - Blood (collected 12-18-24 @ 16:45)  Source: .Blood BLOOD  Preliminary Report (12-19-24 @ 23:01):    No growth at 24 hours    Culture - Blood (collected 12-18-24 @ 16:40)  Source: .Blood BLOOD  Preliminary Report (12-19-24 @ 23:01):    No growth at 24 hours        Culture - Urine (collected 12-18-24 @ 19:27)  Source: Clean Catch Clean Catch (Midstream)  Final Report (12-19-24 @ 22:15):    <10,000 CFU/mL Normal Urogenital Genet    Physical Examination:  PULM: b/l expiratory wheeze, few scattered rhonchi   CVS: S1, S2 heard    RADIOLOGY REVIEWED  CXR: RLL infiltrate

## 2024-12-20 NOTE — PROGRESS NOTE ADULT - SUBJECTIVE AND OBJECTIVE BOX
INTERVAL HPI/OVERNIGHT EVENTS:  Patient S&E at bedside. No o/n events. States improved but still coughing. On abx, steroids and O2.     VITAL SIGNS:  T(F): 98.8 (12-20-24 @ 04:41)  HR: 99 (12-20-24 @ 04:41)  BP: 112/76 (12-20-24 @ 04:41)  RR: 18 (12-20-24 @ 04:41)  SpO2: 98% (12-20-24 @ 04:41)  Wt(kg): --    PHYSICAL EXAM:    Constitutional: NAD  Eyes: EOMI, sclera non-icteric  Neck: supple  Respiratory: On O2, adventitious sounds b/l, more RLL   Cardiovascular: RRR  Gastrointestinal: soft, NTND, + BS  Extremities: no cyanosis, clubbing or edema   Neurological: awake and alert      MEDICATIONS  (STANDING):  albuterol/ipratropium for Nebulization 3 milliLiter(s) Nebulizer every 6 hours  azithromycin  IVPB 500 milliGRAM(s) IV Intermittent every 24 hours  cefepime   IVPB 2000 milliGRAM(s) IV Intermittent every 12 hours  chlorhexidine 2% Cloths 1 Application(s) Topical daily  dapagliflozin 10 milliGRAM(s) Oral daily  enoxaparin Injectable 40 milliGRAM(s) SubCutaneous every 24 hours  famotidine    Tablet 40 milliGRAM(s) Oral daily  fluticasone propionate/ salmeterol 250-50 MICROgram(s) Diskus 1 Dose(s) Inhalation two times a day  guaiFENesin ER 1200 milliGRAM(s) Oral every 12 hours  levothyroxine 50 MICROGram(s) Oral daily  methylPREDNISolone sodium succinate Injectable 20 milliGRAM(s) IV Push every 8 hours  montelukast 10 milliGRAM(s) Oral daily  senna 2 Tablet(s) Oral at bedtime  tiotropium 2.5 MICROgram(s) Inhaler 2 Puff(s) Inhalation daily    MEDICATIONS  (PRN):  acetaminophen     Tablet .. 650 milliGRAM(s) Oral every 6 hours PRN Temp greater or equal to 38C (100.4F), Mild Pain (1 - 3)  melatonin 3 milliGRAM(s) Oral at bedtime PRN Insomnia      Allergies    sulfa drugs (Unknown)    Intolerances        LABS:                        9.9    17.81 )-----------( 104      ( 20 Dec 2024 07:02 )             33.5     12-20    138  |  107  |  23  ----------------------------<  194[H]  4.4   |  19[L]  |  0.93    Ca    8.8      20 Dec 2024 07:02    TPro  6.0  /  Alb  3.3  /  TBili  0.3  /  DBili  x   /  AST  13  /  ALT  17  /  AlkPhos  56  12-19    PT/INR - ( 18 Dec 2024 17:26 )   PT: 12.7 sec;   INR: 1.12 ratio         PTT - ( 18 Dec 2024 17:26 )  PTT:27.4 sec  Urinalysis Basic - ( 20 Dec 2024 07:02 )    Color: x / Appearance: x / SG: x / pH: x  Gluc: 194 mg/dL / Ketone: x  / Bili: x / Urobili: x   Blood: x / Protein: x / Nitrite: x   Leuk Esterase: x / RBC: x / WBC x   Sq Epi: x / Non Sq Epi: x / Bacteria: x        RADIOLOGY & ADDITIONAL TESTS:  Studies reviewed.

## 2024-12-20 NOTE — PROGRESS NOTE ADULT - SUBJECTIVE AND OBJECTIVE BOX
DATE OF SERVICE: 12-20-24 @ 16:21    Patient is a 68y old  Male who presents with a chief complaint of AHRF, PNA (20 Dec 2024 10:36)      INTERVAL HISTORY: in no acute distress    REVIEW OF SYSTEMS:  CONSTITUTIONAL: No weakness  EYES/ENT: No visual changes;  No throat pain   NECK: No pain or stiffness  RESPIRATORY: No cough, wheezing; No shortness of breath  CARDIOVASCULAR: No chest pain or palpitations  GASTROINTESTINAL: No abdominal  pain. No nausea, vomiting, or hematemesis  GENITOURINARY: No dysuria, frequency or hematuria  NEUROLOGICAL: No stroke like symptoms  SKIN: No rashes    	  MEDICATIONS:        PHYSICAL EXAM:  T(C): 37.1 (12-20-24 @ 04:41), Max: 37.1 (12-20-24 @ 04:41)  HR: 99 (12-20-24 @ 04:41) (99 - 111)  BP: 112/76 (12-20-24 @ 04:41) (112/76 - 120/81)  RR: 18 (12-20-24 @ 08:58) (18 - 20)  SpO2: 99% (12-20-24 @ 08:58) (95% - 99%)  Wt(kg): --  I&O's Summary    19 Dec 2024 07:01  -  20 Dec 2024 07:00  --------------------------------------------------------  IN: 240 mL / OUT: 0 mL / NET: 240 mL          Appearance: In no distress	  HEENT:    PERRL, EOMI	  Cardiovascular:  S1 S2, No JVD  Respiratory: Lungs clear to auscultation	  Gastrointestinal:  Soft, Non-tender, + BS	  Vascularature:  No edema of LE  Psychiatric: Appropriate affect   Neuro: no acute focal deficits                               9.9    17.81 )-----------( 104      ( 20 Dec 2024 07:02 )             33.5     12-20    138  |  107  |  23  ----------------------------<  194[H]  4.4   |  19[L]  |  0.93    Ca    8.8      20 Dec 2024 07:02    TPro  6.0  /  Alb  3.3  /  TBili  0.3  /  DBili  x   /  AST  13  /  ALT  17  /  AlkPhos  56  12-19        Labs personally reviewed      ASSESSMENT/PLAN: 	    This is a 69 y/o male w/ PMHx multiple myeloma on Ninlaro, HFrEF (EF 47%) 2/2 NICM due to chemotherapy, hypothyroidism, and asthma who presents for fever and cough. His symptoms started 4 days ago, but got a lot worse last night, when he started developing fevers up to 102 at home and cough that is productive of yellow sputum. He went to his oncologist's (Dr. Agustin Block) office today, was found to be febrile in the office with wheezing and tachyonea. Had labs checked as well, and his CBC came back w/ a WBC of 25. He was told to go to the hospital.    1. Shortness of Breath  - CXR suggestive of PNA  - WBC elevated  - + RSV  - Hx of sHF with most recent TTE showing EF 51%  - Appears euvolemic. Shortness of breath 2/2 Viral PNA  - c/w IV Abx, IV steroids, supportive care    2. Chronic Systolic HF  - Prior diagnostic cath 2023 with no CAD therefore non ischemic cardiomyopathy. 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  - Continue Farxiga 10mg PO daily   - Appears euvolemic    3. DVT Ppx  - c/w Lovenox 40mg SQ daily    4. Hypothyroidism  - c/w Synthroid        JAISON Correia DO [facility]  Cardiovascular Medicine  [address] [place] [address], [address]  Available through call or text on [employer] TE[employer]  Office: [phone]     DATE OF SERVICE: 12-20-24 @ 16:21    Patient is a 68y old  Male who presents with a chief complaint of AHRF, PNA (20 Dec 2024 10:36)      INTERVAL HISTORY: in no acute distress    REVIEW OF SYSTEMS:  CONSTITUTIONAL: No weakness  EYES/ENT: No visual changes;  No throat pain   NECK: No pain or stiffness  RESPIRATORY: No cough, + wheezing; No shortness of breath  CARDIOVASCULAR: No chest pain or palpitations  GASTROINTESTINAL: No abdominal  pain. No nausea, vomiting, or hematemesis  GENITOURINARY: No dysuria, frequency or hematuria  NEUROLOGICAL: No stroke like symptoms  SKIN: No rashes    	  MEDICATIONS:        PHYSICAL EXAM:  T(C): 37.1 (12-20-24 @ 04:41), Max: 37.1 (12-20-24 @ 04:41)  HR: 99 (12-20-24 @ 04:41) (99 - 111)  BP: 112/76 (12-20-24 @ 04:41) (112/76 - 120/81)  RR: 18 (12-20-24 @ 08:58) (18 - 20)  SpO2: 99% (12-20-24 @ 08:58) (95% - 99%)  Wt(kg): --  I&O's Summary    19 Dec 2024 07:01  -  20 Dec 2024 07:00  --------------------------------------------------------  IN: 240 mL / OUT: 0 mL / NET: 240 mL          Appearance: In no distress	  HEENT:    PERRL, EOMI	  Cardiovascular:  S1 S2, No JVD  Respiratory: + wheezing  Gastrointestinal:  Soft, Non-tender, + BS	  Vascularature:  No edema of LE  Psychiatric: Appropriate affect   Neuro: no acute focal deficits                               9.9    17.81 )-----------( 104      ( 20 Dec 2024 07:02 )             33.5     12-20    138  |  107  |  23  ----------------------------<  194[H]  4.4   |  19[L]  |  0.93    Ca    8.8      20 Dec 2024 07:02    TPro  6.0  /  Alb  3.3  /  TBili  0.3  /  DBili  x   /  AST  13  /  ALT  17  /  AlkPhos  56  12-19        Labs personally reviewed      ASSESSMENT/PLAN: 	    This is a 69 y/o male w/ PMHx multiple myeloma on Ninlaro, HFrEF (EF 47%) 2/2 NICM due to chemotherapy, hypothyroidism, and asthma who presents for fever and cough. His symptoms started 4 days ago, but got a lot worse last night, when he started developing fevers up to 102 at home and cough that is productive of yellow sputum. He went to his oncologist's (Dr. Agustin Block) office today, was found to be febrile in the office with wheezing and tachyonea. Had labs checked as well, and his CBC came back w/ a WBC of 25. He was told to go to the hospital.    1. Shortness of Breath  - CXR suggestive of PNA  - WBC elevated  - + RSV  - Hx of sHF with most recent TTE showing EF 51%  - Appears euvolemic. Shortness of breath 2/2 Viral PNA  - c/w IV Abx, IV steroids, supportive care    2. Chronic Systolic HF  - Prior diagnostic cath 2023 with no CAD therefore non ischemic cardiomyopathy. 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  - Continue Farxiga 10mg PO daily   - Appears euvolemic    3. DVT Ppx  - c/w Lovenox 40mg SQ daily    4. Hypothyroidism  - c/w Synthroid        JAISON Correia,  [facility]  Cardiovascular Medicine  [address] [place] [address], [address]  Available through call or text on [employer] TEAM[employer]  Office: [phone]

## 2024-12-20 NOTE — PROGRESS NOTE ADULT - SUBJECTIVE AND OBJECTIVE BOX
Name of Patient : CARMEN BERNSTEIN  MRN: [id number]  Date of visit: 12-20-24       Subjective: Patient seen and examined. No new events except as noted.     REVIEW OF SYSTEMS:    CONSTITUTIONAL: No weakness, fevers or chills  EYES/ENT: No visual changes;  No vertigo or throat pain   NECK: No pain or stiffness  RESPIRATORY: No cough, wheezing, hemoptysis; No shortness of breath  CARDIOVASCULAR: No chest pain or palpitations  GASTROINTESTINAL: No abdominal or epigastric pain. No nausea, vomiting, or hematemesis; No diarrhea or constipation. No melena or hematochezia.  GENITOURINARY: No dysuria, frequency or hematuria  NEUROLOGICAL: No numbness or weakness  SKIN: No itching, burning, rashes, or lesions   All other review of systems is negative unless indicated above.    MEDICATIONS:  MEDICATIONS  (STANDING):  albuterol/ipratropium for Nebulization 3 milliLiter(s) Nebulizer every 6 hours  cefepime   IVPB 2000 milliGRAM(s) IV Intermittent every 12 hours  chlorhexidine 2% Cloths 1 Application(s) Topical daily  dapagliflozin 10 milliGRAM(s) Oral daily  enoxaparin Injectable 40 milliGRAM(s) SubCutaneous every 24 hours  famotidine    Tablet 40 milliGRAM(s) Oral daily  fluticasone propionate/ salmeterol 250-50 MICROgram(s) Diskus 1 Dose(s) Inhalation two times a day  guaiFENesin ER 1200 milliGRAM(s) Oral every 12 hours  levothyroxine 50 MICROGram(s) Oral daily  methylPREDNISolone sodium succinate Injectable 20 milliGRAM(s) IV Push every 8 hours  montelukast 10 milliGRAM(s) Oral daily  senna 2 Tablet(s) Oral at bedtime  tiotropium 2.5 MICROgram(s) Inhaler 2 Puff(s) Inhalation daily      PHYSICAL EXAM:  T(C): 36.5 (12-20-24 @ 16:33), Max: 37.1 (12-20-24 @ 04:41)  HR: 89 (12-20-24 @ 16:33) (89 - 111)  BP: 145/92 (12-20-24 @ 16:33) (112/76 - 145/92)  RR: 18 (12-20-24 @ 16:33) (18 - 20)  SpO2: 99% (12-20-24 @ 16:33) (95% - 99%)  Wt(kg): --  I&O's Summary    19 Dec 2024 07:01  -  20 Dec 2024 07:00  --------------------------------------------------------  IN: 240 mL / OUT: 0 mL / NET: 240 mL    Appearance: Normal	  HEENT:  PERRLA   Lymphatic: No lymphadenopathy   Cardiovascular: Normal S1 S2, no JVD  Respiratory: normal effort , clear  Gastrointestinal:  Soft, Non-tender  Skin: No rashes,  warm to touch  Psychiatry:  Mood & affect appropriate  Musculuskeletal: No edema    recent labs, Imaging and EKGs personally reviewed   CODE status discussed with the patient in detail    12-19-24 @ 07:01  -  12-20-24 @ 07:00  --------------------------------------------------------  IN: 240 mL / OUT: 0 mL / NET: 240 mL                          9.9    17.81 )-----------( 104      ( 20 Dec 2024 07:02 )             33.5               12-20    138  |  107  |  23  ----------------------------<  194[H]  4.4   |  19[L]  |  0.93    Ca    8.8      20 Dec 2024 07:02    TPro  6.0  /  Alb  3.3  /  TBili  0.3  /  DBili  x   /  AST  13  /  ALT  17  /  AlkPhos  56  12-19                       Urinalysis Basic - ( 20 Dec 2024 07:02 )    Color: x / Appearance: x / SG: x / pH: x  Gluc: 194 mg/dL / Ketone: x  / Bili: x / Urobili: x   Blood: x / Protein: x / Nitrite: x   Leuk Esterase: x / RBC: x / WBC x   Sq Epi: x / Non Sq Epi: x / Bacteria: x

## 2024-12-20 NOTE — PROGRESS NOTE ADULT - SUBJECTIVE AND OBJECTIVE BOX
CC: F/U for PNA    Saw/spoke to patient. No fevers, no chills. No new complaints.    Allergies  sulfa drugs (Unknown)    ANTIMICROBIALS:  azithromycin  IVPB 500 every 24 hours  cefepime   IVPB 2000 every 12 hours    PE:    Vital Signs Last 24 Hrs  T(C): 37.1 (20 Dec 2024 04:41), Max: 37.1 (20 Dec 2024 04:41)  T(F): 98.8 (20 Dec 2024 04:41), Max: 98.8 (20 Dec 2024 04:41)  HR: 99 (20 Dec 2024 04:41) (90 - 111)  BP: 112/76 (20 Dec 2024 04:41) (106/70 - 120/81)  RR: 18 (20 Dec 2024 08:58) (18 - 20)  SpO2: 99% (20 Dec 2024 08:58) (95% - 99%)    Gen: AOx3, NAD, non-toxic  CV: Nontachycardic  Resp: Breathing comfortably, RA  Abd: Soft, nontender  IV/Skin: No thrombophlebitis    LABS:                        9.9    17.81 )-----------( 104      ( 20 Dec 2024 07:02 )             33.5     12-20    138  |  107  |  23  ----------------------------<  194[H]  4.4   |  19[L]  |  0.93    Ca    8.8      20 Dec 2024 07:02    TPro  6.0  /  Alb  3.3  /  TBili  0.3  /  DBili  x   /  AST  13  /  ALT  17  /  AlkPhos  56  12-19    Urinalysis Basic - ( 20 Dec 2024 07:02 )    Color: x / Appearance: x / SG: x / pH: x  Gluc: 194 mg/dL / Ketone: x  / Bili: x / Urobili: x   Blood: x / Protein: x / Nitrite: x   Leuk Esterase: x / RBC: x / WBC x   Sq Epi: x / Non Sq Epi: x / Bacteria: x    MICROBIOLOGY:    Clean Catch Clean Catch (Midstream)  12-18-24   <10,000 CFU/mL Normal Urogenital Genet  --  --    .Blood BLOOD  12-18-24   No growth at 24 hours  --  --    .Blood BLOOD  12-18-24   No growth at 24 hours  --  --    RADIOLOGY:    12/20    IMPRESSION: Patchy opacities are noted within both lungs, more so in both   lower lobes. Exact etiology is unclear.

## 2024-12-21 RX ORDER — PREDNISONE 5 MG
30 TABLET ORAL DAILY
Refills: 0 | Status: CANCELLED | OUTPATIENT
Start: 2024-12-25 | End: 2024-12-23

## 2024-12-21 RX ORDER — PREDNISONE 5 MG
5 TABLET ORAL DAILY
Refills: 0 | Status: CANCELLED | OUTPATIENT
Start: 2025-01-02 | End: 2024-12-23

## 2024-12-21 RX ORDER — PREDNISONE 5 MG
TABLET ORAL
Refills: 0 | Status: DISCONTINUED | OUTPATIENT
Start: 2024-12-21 | End: 2024-12-23

## 2024-12-21 RX ORDER — PREDNISONE 5 MG
20 TABLET ORAL DAILY
Refills: 0 | Status: CANCELLED | OUTPATIENT
Start: 2024-12-28 | End: 2024-12-23

## 2024-12-21 RX ORDER — PREDNISONE 5 MG
40 TABLET ORAL DAILY
Refills: 0 | Status: DISCONTINUED | OUTPATIENT
Start: 2024-12-21 | End: 2024-12-23

## 2024-12-21 RX ORDER — PREDNISONE 5 MG
10 TABLET ORAL DAILY
Refills: 0 | Status: CANCELLED | OUTPATIENT
Start: 2024-12-31 | End: 2024-12-23

## 2024-12-21 RX ADMIN — TIOTROPIUM BROMIDE MONOHYDRATE 2 PUFF(S): 18 CAPSULE ORAL; RESPIRATORY (INHALATION) at 12:40

## 2024-12-21 RX ADMIN — IPRATROPIUM BROMIDE AND ALBUTEROL SULFATE 3 MILLILITER(S): .5; 2.5 SOLUTION RESPIRATORY (INHALATION) at 17:55

## 2024-12-21 RX ADMIN — Medication 1200 MILLIGRAM(S): at 17:54

## 2024-12-21 RX ADMIN — METHYLPREDNISOLONE 20 MILLIGRAM(S): 4 TABLET ORAL at 06:31

## 2024-12-21 RX ADMIN — MONTELUKAST SODIUM 10 MILLIGRAM(S): 10 TABLET, FILM COATED ORAL at 12:40

## 2024-12-21 RX ADMIN — IPRATROPIUM BROMIDE AND ALBUTEROL SULFATE 3 MILLILITER(S): .5; 2.5 SOLUTION RESPIRATORY (INHALATION) at 06:31

## 2024-12-21 RX ADMIN — FLUTICASONE PROPIONATE AND SALMETEROL 1 DOSE(S): 50; 500 POWDER ORAL; RESPIRATORY (INHALATION) at 17:55

## 2024-12-21 RX ADMIN — Medication 100 MILLIGRAM(S): at 17:54

## 2024-12-21 RX ADMIN — Medication 40 MILLIGRAM(S): at 22:38

## 2024-12-21 RX ADMIN — CHLORHEXIDINE GLUCONATE 1 APPLICATION(S): 1.2 RINSE ORAL at 12:45

## 2024-12-21 RX ADMIN — Medication 100 MILLIGRAM(S): at 06:32

## 2024-12-21 RX ADMIN — FLUTICASONE PROPIONATE AND SALMETEROL 1 DOSE(S): 50; 500 POWDER ORAL; RESPIRATORY (INHALATION) at 06:32

## 2024-12-21 RX ADMIN — LEVOTHYROXINE SODIUM 50 MICROGRAM(S): 175 TABLET ORAL at 06:31

## 2024-12-21 RX ADMIN — ENOXAPARIN SODIUM 40 MILLIGRAM(S): 60 INJECTION INTRAVENOUS; SUBCUTANEOUS at 22:34

## 2024-12-21 RX ADMIN — SENNOSIDES 2 TABLET(S): 8.6 TABLET, FILM COATED ORAL at 22:33

## 2024-12-21 RX ADMIN — DAPAGLIFLOZIN 10 MILLIGRAM(S): 5 TABLET, FILM COATED ORAL at 12:39

## 2024-12-21 RX ADMIN — FAMOTIDINE 40 MILLIGRAM(S): 20 TABLET, FILM COATED ORAL at 12:40

## 2024-12-21 RX ADMIN — Medication 1200 MILLIGRAM(S): at 06:31

## 2024-12-21 NOTE — PROGRESS NOTE ADULT - SUBJECTIVE AND OBJECTIVE BOX
DATE OF SERVICE: 12-21-24 @ 17:17    Patient is a 68y old  Male who presents with a chief complaint of AHRF, PNA (21 Dec 2024 13:02)      INTERVAL HISTORY: no complaints     REVIEW OF SYSTEMS:  CONSTITUTIONAL: No weakness  EYES/ENT: No visual changes;  No throat pain   NECK: No pain or stiffness  RESPIRATORY: No cough, wheezing; No shortness of breath  CARDIOVASCULAR: No chest pain or palpitations  GASTROINTESTINAL: No abdominal  pain. No nausea, vomiting, or hematemesis  GENITOURINARY: No dysuria, frequency or hematuria  NEUROLOGICAL: No stroke like symptoms  SKIN: No rashes    	  MEDICATIONS:        PHYSICAL EXAM:  T(C): 36.4 (12-21-24 @ 00:15), Max: 36.4 (12-21-24 @ 00:15)  HR: 98 (12-21-24 @ 00:15) (98 - 98)  BP: 136/83 (12-21-24 @ 00:15) (136/83 - 136/83)  RR: 18 (12-21-24 @ 14:01) (18 - 18)  SpO2: 96% (12-21-24 @ 14:01) (96% - 96%)  Wt(kg): --  I&O's Summary        Appearance: In no distress	  HEENT:    PERRL, EOMI	  Cardiovascular:  S1 S2, No JVD  Respiratory: Lungs clear to auscultation	  Gastrointestinal:  Soft, Non-tender, + BS	  Vascularature:  No edema of LE  Psychiatric: Appropriate affect   Neuro: no acute focal deficits                               9.9    17.81 )-----------( 104      ( 20 Dec 2024 07:02 )             33.5     12-20    138  |  107  |  23  ----------------------------<  194[H]  4.4   |  19[L]  |  0.93    Ca    8.8      20 Dec 2024 07:02          Labs personally reviewed      ASSESSMENT/PLAN: 	    This is a 69 y/o male w/ PMHx multiple myeloma on Ninlaro, HFrEF (EF 47%) 2/2 NICM due to chemotherapy, hypothyroidism, and asthma who presents for fever and cough. His symptoms started 4 days ago, but got a lot worse last night, when he started developing fevers up to 102 at home and cough that is productive of yellow sputum. He went to his oncologist's (Dr. Agustin Block) office today, was found to be febrile in the office with wheezing and tachyonea. Had labs checked as well, and his CBC came back w/ a WBC of 25. He was told to go to the hospital.    1. Shortness of Breath  - CXR suggestive of PNA  - WBC elevated  - + RSV  - Hx of sHF with most recent TTE showing EF 51%  - Appears euvolemic. Shortness of breath 2/2 Viral PNA  - c/w IV Abx, IV steroids, supportive care    2. Chronic Systolic HF  - Prior diagnostic cath 2023 with no CAD therefore non ischemic cardiomyopathy. 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  - Continue Farxiga 10mg PO daily   - Appears euvolemic    3. DVT Ppx  - c/w Lovenox 40mg SQ daily    4. Hypothyroidism  - c/w Synthroid            JAISON Muir DO [facility]  Cardiovascular Medicine  [address] [facility], [address]  Office: [phone]  Available via call/text on [employer] Teams

## 2024-12-21 NOTE — PROGRESS NOTE ADULT - SUBJECTIVE AND OBJECTIVE BOX
INTERVAL HPI/OVERNIGHT EVENTS:  Patient S&E at bedside. No o/n events. O2 status has improved, on room air when examining today. He does still have cough, adventitious sounds on exam but improving. CT noted with opacities, improving on antibiotics. Discussed with son as well.     VITAL SIGNS:  T(F): 97.6 (12-21-24 @ 00:15)  HR: 98 (12-21-24 @ 00:15)  BP: 136/83 (12-21-24 @ 00:15)  RR: 18 (12-21-24 @ 00:15)  SpO2: 96% (12-21-24 @ 00:15)  Wt(kg): --    PHYSICAL EXAM:    Constitutional: NAD  Eyes: EOMI, sclera non-icteric  Neck: supple  Respiratory: +ronchi b/l  Cardiovascular: RRR  Gastrointestinal: soft, NTND, + BS  Extremities: no cyanosis, clubbing or edema   Neurological: awake and alert      MEDICATIONS  (STANDING):  albuterol/ipratropium for Nebulization 3 milliLiter(s) Nebulizer every 6 hours  cefepime   IVPB 2000 milliGRAM(s) IV Intermittent every 12 hours  chlorhexidine 2% Cloths 1 Application(s) Topical daily  dapagliflozin 10 milliGRAM(s) Oral daily  enoxaparin Injectable 40 milliGRAM(s) SubCutaneous every 24 hours  famotidine    Tablet 40 milliGRAM(s) Oral daily  fluticasone propionate/ salmeterol 250-50 MICROgram(s) Diskus 1 Dose(s) Inhalation two times a day  guaiFENesin ER 1200 milliGRAM(s) Oral every 12 hours  levothyroxine 50 MICROGram(s) Oral daily  montelukast 10 milliGRAM(s) Oral daily  predniSONE   Tablet   Oral   predniSONE   Tablet 40 milliGRAM(s) Oral daily  senna 2 Tablet(s) Oral at bedtime  tiotropium 2.5 MICROgram(s) Inhaler 2 Puff(s) Inhalation daily    MEDICATIONS  (PRN):  acetaminophen     Tablet .. 650 milliGRAM(s) Oral every 6 hours PRN Temp greater or equal to 38C (100.4F), Mild Pain (1 - 3)  melatonin 3 milliGRAM(s) Oral at bedtime PRN Insomnia      Allergies    sulfa drugs (Unknown)    Intolerances        LABS:                        9.9    17.81 )-----------( 104      ( 20 Dec 2024 07:02 )             33.5     12-20    138  |  107  |  23  ----------------------------<  194[H]  4.4   |  19[L]  |  0.93    Ca    8.8      20 Dec 2024 07:02        Urinalysis Basic - ( 20 Dec 2024 07:02 )    Color: x / Appearance: x / SG: x / pH: x  Gluc: 194 mg/dL / Ketone: x  / Bili: x / Urobili: x   Blood: x / Protein: x / Nitrite: x   Leuk Esterase: x / RBC: x / WBC x   Sq Epi: x / Non Sq Epi: x / Bacteria: x        RADIOLOGY & ADDITIONAL TESTS:  Studies reviewed.

## 2024-12-21 NOTE — PROGRESS NOTE ADULT - SUBJECTIVE AND OBJECTIVE BOX
Follow-up Pulm Progress Note    feeling better  less SOB, wheezing  denies CP   o2 sats currently 92-94% on RA at rest     Medications:  MEDICATIONS  (STANDING):  albuterol/ipratropium for Nebulization 3 milliLiter(s) Nebulizer every 6 hours  cefepime   IVPB 2000 milliGRAM(s) IV Intermittent every 12 hours  chlorhexidine 2% Cloths 1 Application(s) Topical daily  dapagliflozin 10 milliGRAM(s) Oral daily  enoxaparin Injectable 40 milliGRAM(s) SubCutaneous every 24 hours  famotidine    Tablet 40 milliGRAM(s) Oral daily  fluticasone propionate/ salmeterol 250-50 MICROgram(s) Diskus 1 Dose(s) Inhalation two times a day  guaiFENesin ER 1200 milliGRAM(s) Oral every 12 hours  levothyroxine 50 MICROGram(s) Oral daily  methylPREDNISolone sodium succinate Injectable 20 milliGRAM(s) IV Push every 8 hours  montelukast 10 milliGRAM(s) Oral daily  senna 2 Tablet(s) Oral at bedtime  tiotropium 2.5 MICROgram(s) Inhaler 2 Puff(s) Inhalation daily    MEDICATIONS  (PRN):  acetaminophen     Tablet .. 650 milliGRAM(s) Oral every 6 hours PRN Temp greater or equal to 38C (100.4F), Mild Pain (1 - 3)  melatonin 3 milliGRAM(s) Oral at bedtime PRN Insomnia          Vital Signs Last 24 Hrs  T(C): 36.4 (21 Dec 2024 00:15), Max: 36.5 (20 Dec 2024 16:33)  T(F): 97.6 (21 Dec 2024 00:15), Max: 97.7 (20 Dec 2024 16:33)  HR: 98 (21 Dec 2024 00:15) (89 - 98)  BP: 136/83 (21 Dec 2024 00:15) (136/83 - 145/92)  BP(mean): --  RR: 18 (21 Dec 2024 00:15) (18 - 18)  SpO2: 96% (21 Dec 2024 00:15) (96% - 99%)    Parameters below as of 21 Dec 2024 00:15  Patient On (Oxygen Delivery Method): nasal cannula  O2 Flow (L/min): 2                LABS:                        9.9    17.81 )-----------( 104      ( 20 Dec 2024 07:02 )             33.5     12-20    138  |  107  |  23  ----------------------------<  194[H]  4.4   |  19[L]  |  0.93    Ca    8.8      20 Dec 2024 07:02            CAPILLARY BLOOD GLUCOSE          Urinalysis Basic - ( 20 Dec 2024 07:02 )    Color: x / Appearance: x / SG: x / pH: x  Gluc: 194 mg/dL / Ketone: x  / Bili: x / Urobili: x   Blood: x / Protein: x / Nitrite: x   Leuk Esterase: x / RBC: x / WBC x   Sq Epi: x / Non Sq Epi: x / Bacteria: x      Procalcitonin: 2.14 ng/mL (12-18-24 @ 17:26)                  CULTURES:     Culture - Blood (collected 12-18-24 @ 16:45)  Source: .Blood BLOOD  Preliminary Report (12-20-24 @ 23:01):    No growth at 48 Hours    Culture - Blood (collected 12-18-24 @ 16:40)  Source: .Blood BLOOD  Preliminary Report (12-20-24 @ 23:01):    No growth at 48 Hours        Culture - Urine (collected 12-18-24 @ 19:27)  Source: Clean Catch Clean Catch (Midstream)  Final Report (12-19-24 @ 22:15):    <10,000 CFU/mL Normal Urogenital Genet            Physical Examination:  PULM: scattered rhonchi at bases   CVS: S1, S2 heard    RADIOLOGY REVIEWED  CT chest: < from: CT Chest No Cont (12.20.24 @ 11:28) >  INTERPRETATION:  Clinical information: Immunosuppressed. Fever and cough.   Exam is compared to previous study of 12/5/2023.    CT scan of the chest was obtained without administration of intravenous   contrast.    A few calcified and noncalcified lymph nodes are present in the   mediastinum.    Heart is normal in size. Very small pericardial effusion is noted.    No endobronchial lesions are noted. Few small nodules are noted within   the apical portions of both upper lobes. They are unchanged when compared   to previous exam. Patchy opacities are noted within both lungs, more so   in both lower lobes. No pleural effusions are noted.    Belowthe diaphragm, visualized portions of the abdomen demonstrate   patient to be status post cholecystectomy.    Degenerative changes of the spine are noted.    IMPRESSION: Patchy opacities are noted within both lungs, more so in both   lower lobes. Exact etiology is unclear.    < end of copied text >

## 2024-12-21 NOTE — PROGRESS NOTE ADULT - SUBJECTIVE AND OBJECTIVE BOX
Name of Patient : CARMEN BERNSTEIN  MRN: [id number]      Subjective: Patient seen and examined. No new events except as noted.     REVIEW OF SYSTEMS:    CONSTITUTIONAL: No weakness, fevers or chills  EYES/ENT: No visual changes;  No vertigo or throat pain   NECK: No pain or stiffness  RESPIRATORY: No cough, wheezing, hemoptysis; No shortness of breath  CARDIOVASCULAR: No chest pain or palpitations  GASTROINTESTINAL: No abdominal or epigastric pain. No nausea, vomiting, or hematemesis; No diarrhea or constipation. No melena or hematochezia.  GENITOURINARY: No dysuria, frequency or hematuria  NEUROLOGICAL: No numbness or weakness  SKIN: No itching, burning, rashes, or lesions   All other review of systems is negative unless indicated above.    MEDICATIONS:  MEDICATIONS  (STANDING):  albuterol/ipratropium for Nebulization 3 milliLiter(s) Nebulizer every 6 hours  cefepime   IVPB 2000 milliGRAM(s) IV Intermittent every 12 hours  chlorhexidine 2% Cloths 1 Application(s) Topical daily  dapagliflozin 10 milliGRAM(s) Oral daily  enoxaparin Injectable 40 milliGRAM(s) SubCutaneous every 24 hours  famotidine    Tablet 40 milliGRAM(s) Oral daily  fluticasone propionate/ salmeterol 250-50 MICROgram(s) Diskus 1 Dose(s) Inhalation two times a day  guaiFENesin ER 1200 milliGRAM(s) Oral every 12 hours  levothyroxine 50 MICROGram(s) Oral daily  montelukast 10 milliGRAM(s) Oral daily  predniSONE   Tablet   Oral   predniSONE   Tablet 40 milliGRAM(s) Oral daily  senna 2 Tablet(s) Oral at bedtime  tiotropium 2.5 MICROgram(s) Inhaler 2 Puff(s) Inhalation daily      PHYSICAL EXAM:  T(C): 36.5 (12-21-24 @ 23:42), Max: 36.8 (12-21-24 @ 20:47)  HR: 82 (12-21-24 @ 23:42) (82 - 91)  BP: 133/86 (12-21-24 @ 23:42) (128/83 - 151/96)  RR: 18 (12-21-24 @ 23:42) (18 - 18)  SpO2: 95% (12-21-24 @ 23:42) (95% - 97%)  Wt(kg): --  I&O's Summary    21 Dec 2024 07:01  -  22 Dec 2024 00:31  --------------------------------------------------------  IN: 840 mL / OUT: 0 mL / NET: 840 mL          Appearance: Normal	  HEENT:  PERRLA   Lymphatic: No lymphadenopathy   Cardiovascular: Normal S1 S2, no JVD  Respiratory: normal effort , clear  Gastrointestinal:  Soft, Non-tender  Skin: No rashes,  warm to touch  Psychiatry:  Mood & affect appropriate  Musculuskeletal: No edema    recent labs, Imaging and EKGs personally reviewed     12-21-24 @ 07:01  -  12-22-24 @ 00:31  --------------------------------------------------------  IN: 840 mL / OUT: 0 mL / NET: 840 mL                          9.9    17.81 )-----------( 104      ( 20 Dec 2024 07:02 )             33.5               12-20    138  |  107  |  23  ----------------------------<  194[H]  4.4   |  19[L]  |  0.93    Ca    8.8      20 Dec 2024 07:02                         Urinalysis Basic - ( 20 Dec 2024 07:02 )    Color: x / Appearance: x / SG: x / pH: x  Gluc: 194 mg/dL / Ketone: x  / Bili: x / Urobili: x   Blood: x / Protein: x / Nitrite: x   Leuk Esterase: x / RBC: x / WBC x   Sq Epi: x / Non Sq Epi: x / Bacteria: x

## 2024-12-22 RX ORDER — BENZONATATE 100 MG
100 CAPSULE ORAL ONCE
Refills: 0 | Status: COMPLETED | OUTPATIENT
Start: 2024-12-22 | End: 2024-12-22

## 2024-12-22 RX ADMIN — LEVOTHYROXINE SODIUM 50 MICROGRAM(S): 175 TABLET ORAL at 05:23

## 2024-12-22 RX ADMIN — Medication 100 MILLIGRAM(S): at 18:16

## 2024-12-22 RX ADMIN — Medication 1200 MILLIGRAM(S): at 05:23

## 2024-12-22 RX ADMIN — IPRATROPIUM BROMIDE AND ALBUTEROL SULFATE 3 MILLILITER(S): .5; 2.5 SOLUTION RESPIRATORY (INHALATION) at 11:35

## 2024-12-22 RX ADMIN — FLUTICASONE PROPIONATE AND SALMETEROL 1 DOSE(S): 50; 500 POWDER ORAL; RESPIRATORY (INHALATION) at 05:24

## 2024-12-22 RX ADMIN — ACETAMINOPHEN 650 MILLIGRAM(S): 80 SOLUTION/ DROPS ORAL at 21:38

## 2024-12-22 RX ADMIN — FLUTICASONE PROPIONATE AND SALMETEROL 1 DOSE(S): 50; 500 POWDER ORAL; RESPIRATORY (INHALATION) at 18:27

## 2024-12-22 RX ADMIN — Medication 1200 MILLIGRAM(S): at 18:13

## 2024-12-22 RX ADMIN — IPRATROPIUM BROMIDE AND ALBUTEROL SULFATE 3 MILLILITER(S): .5; 2.5 SOLUTION RESPIRATORY (INHALATION) at 00:05

## 2024-12-22 RX ADMIN — IPRATROPIUM BROMIDE AND ALBUTEROL SULFATE 3 MILLILITER(S): .5; 2.5 SOLUTION RESPIRATORY (INHALATION) at 05:24

## 2024-12-22 RX ADMIN — ENOXAPARIN SODIUM 40 MILLIGRAM(S): 60 INJECTION INTRAVENOUS; SUBCUTANEOUS at 21:39

## 2024-12-22 RX ADMIN — SENNOSIDES 2 TABLET(S): 8.6 TABLET, FILM COATED ORAL at 21:38

## 2024-12-22 RX ADMIN — IPRATROPIUM BROMIDE AND ALBUTEROL SULFATE 3 MILLILITER(S): .5; 2.5 SOLUTION RESPIRATORY (INHALATION) at 18:16

## 2024-12-22 RX ADMIN — CHLORHEXIDINE GLUCONATE 1 APPLICATION(S): 1.2 RINSE ORAL at 11:42

## 2024-12-22 RX ADMIN — MONTELUKAST SODIUM 10 MILLIGRAM(S): 10 TABLET, FILM COATED ORAL at 11:33

## 2024-12-22 RX ADMIN — DAPAGLIFLOZIN 10 MILLIGRAM(S): 5 TABLET, FILM COATED ORAL at 11:34

## 2024-12-22 RX ADMIN — Medication 100 MILLIGRAM(S): at 05:23

## 2024-12-22 RX ADMIN — TIOTROPIUM BROMIDE MONOHYDRATE 2 PUFF(S): 18 CAPSULE ORAL; RESPIRATORY (INHALATION) at 11:35

## 2024-12-22 RX ADMIN — FAMOTIDINE 40 MILLIGRAM(S): 20 TABLET, FILM COATED ORAL at 11:33

## 2024-12-22 RX ADMIN — Medication 40 MILLIGRAM(S): at 05:24

## 2024-12-22 RX ADMIN — Medication 100 MILLIGRAM(S): at 21:38

## 2024-12-22 NOTE — PROGRESS NOTE ADULT - SUBJECTIVE AND OBJECTIVE BOX
Name of Patient : CARMEN BERNSTEIN  MRN: [id number]      Subjective: Patient seen and examined. No new events except as noted.     REVIEW OF SYSTEMS:    CONSTITUTIONAL: No weakness, fevers or chills  EYES/ENT: No visual changes;  No vertigo or throat pain   NECK: No pain or stiffness  RESPIRATORY: No cough, wheezing, hemoptysis; No shortness of breath  CARDIOVASCULAR: No chest pain or palpitations  GASTROINTESTINAL: No abdominal or epigastric pain. No nausea, vomiting, or hematemesis; No diarrhea or constipation. No melena or hematochezia.  GENITOURINARY: No dysuria, frequency or hematuria  NEUROLOGICAL: No numbness or weakness  SKIN: No itching, burning, rashes, or lesions   All other review of systems is negative unless indicated above.    MEDICATIONS:  MEDICATIONS  (STANDING):  albuterol/ipratropium for Nebulization 3 milliLiter(s) Nebulizer every 6 hours  cefepime   IVPB 2000 milliGRAM(s) IV Intermittent every 12 hours  chlorhexidine 2% Cloths 1 Application(s) Topical daily  dapagliflozin 10 milliGRAM(s) Oral daily  enoxaparin Injectable 40 milliGRAM(s) SubCutaneous every 24 hours  famotidine    Tablet 40 milliGRAM(s) Oral daily  fluticasone propionate/ salmeterol 250-50 MICROgram(s) Diskus 1 Dose(s) Inhalation two times a day  guaiFENesin ER 1200 milliGRAM(s) Oral every 12 hours  levothyroxine 50 MICROGram(s) Oral daily  montelukast 10 milliGRAM(s) Oral daily  predniSONE   Tablet   Oral   predniSONE   Tablet 40 milliGRAM(s) Oral daily  senna 2 Tablet(s) Oral at bedtime  tiotropium 2.5 MICROgram(s) Inhaler 2 Puff(s) Inhalation daily      PHYSICAL EXAM:  T(C): 36.3 (12-23-24 @ 00:00), Max: 36.5 (12-22-24 @ 15:45)  HR: 90 (12-23-24 @ 00:00) (80 - 117)  BP: 114/77 (12-23-24 @ 00:00) (114/77 - 134/93)  RR: 18 (12-23-24 @ 00:00) (18 - 20)  SpO2: 96% (12-23-24 @ 00:00) (89% - 99%)  Wt(kg): --  I&O's Summary    21 Dec 2024 07:01  -  22 Dec 2024 07:00  --------------------------------------------------------  IN: 840 mL / OUT: 0 mL / NET: 840 mL    Appearance: Normal	  HEENT:  PERRLA   Lymphatic: No lymphadenopathy   Cardiovascular: Normal S1 S2, no JVD  Respiratory: normal effort , clear  Gastrointestinal:  Soft, Non-tender  Skin: No rashes,  warm to touch  Psychiatry:  Mood & affect appropriate  Musculuskeletal: No edema    recent labs, Imaging and EKGs personally reviewed   CODE status discussed with the patient in detail    12-21-24 @ 07:01  -  12-22-24 @ 07:00  --------------------------------------------------------  IN: 840 mL / OUT: 0 mL / NET: 840 mL

## 2024-12-22 NOTE — PROGRESS NOTE ADULT - SUBJECTIVE AND OBJECTIVE BOX
DATE OF SERVICE: 12-22-24 @ 15:56    Patient is a 68y old  Male who presents with a chief complaint of AHRF, PNA (22 Dec 2024 07:45)      INTERVAL HISTORY: no complaints     REVIEW OF SYSTEMS:  CONSTITUTIONAL: No weakness  EYES/ENT: No visual changes;  No throat pain   NECK: No pain or stiffness  RESPIRATORY: No cough, wheezing; No shortness of breath  CARDIOVASCULAR: No chest pain or palpitations  GASTROINTESTINAL: No abdominal  pain. No nausea, vomiting, or hematemesis  GENITOURINARY: No dysuria, frequency or hematuria  NEUROLOGICAL: No stroke like symptoms  SKIN: No rashes      MEDICATIONS:        PHYSICAL EXAM:  T(C): 36.5 (12-22-24 @ 15:45), Max: 36.8 (12-21-24 @ 20:47)  HR: 95 (12-22-24 @ 15:45) (80 - 117)  BP: 124/83 (12-22-24 @ 15:45) (118/77 - 151/96)  RR: 18 (12-22-24 @ 15:45) (18 - 20)  SpO2: 94% (12-22-24 @ 15:45) (89% - 99%)  Wt(kg): --  I&O's Summary    21 Dec 2024 07:01  -  22 Dec 2024 07:00  --------------------------------------------------------  IN: 840 mL / OUT: 0 mL / NET: 840 mL          Appearance: In no distress	  HEENT:    PERRL, EOMI	  Cardiovascular:  S1 S2, No JVD  Respiratory: Lungs clear to auscultation	  Gastrointestinal:  Soft, Non-tender, + BS	  Vascularature:  No edema of LE  Psychiatric: Appropriate affect   Neuro: no acute focal deficits                     Labs personally reviewed      ASSESSMENT/PLAN: 	      This is a 69 y/o male w/ PMHx multiple myeloma on Ninlaro, HFrEF (EF 47%) 2/2 NICM due to chemotherapy, hypothyroidism, and asthma who presents for fever and cough. His symptoms started 4 days ago, but got a lot worse last night, when he started developing fevers up to 102 at home and cough that is productive of yellow sputum. He went to his oncologist's (Dr. Agustin Block) office today, was found to be febrile in the office with wheezing and tachyonea. Had labs checked as well, and his CBC came back w/ a WBC of 25. He was told to go to the hospital.    1. Shortness of Breath  - CXR suggestive of PNA  - WBC elevated  - + RSV  - Hx of sHF with most recent TTE showing EF 51%  - Appears euvolemic. Shortness of breath 2/2 Viral PNA  - c/w IV Abx, IV steroids, supportive care    2. Chronic Systolic HF  - Prior diagnostic cath 2023 with no CAD therefore non ischemic cardiomyopathy. 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  - Continue Farxiga 10mg PO daily   - Appears euvolemic    3. DVT Ppx  - c/w Lovenox 40mg SQ daily    4. Hypothyroidism  - c/w Synthroid            JAISON Muir DO [facility]  Cardiovascular Medicine  [address] [place] [address], [address]  Office: [phone]  Available via call/text on [employer] Teams

## 2024-12-22 NOTE — PROGRESS NOTE ADULT - SUBJECTIVE AND OBJECTIVE BOX
INTERVAL HPI/OVERNIGHT EVENTS:  Patient S&E at bedside. No o/n events. Still having cough but improved.     VITAL SIGNS:  T(F): 97.4 (12-22-24 @ 05:15)  HR: 80 (12-22-24 @ 05:15)  BP: 118/77 (12-22-24 @ 05:15)  RR: 18 (12-22-24 @ 05:15)  SpO2: 97% (12-22-24 @ 05:15)  Wt(kg): --    PHYSICAL EXAM:    Constitutional: NAD  Eyes: EOMI, sclera non-icteric  Neck: supple  Respiratory: b/l ronchi   Cardiovascular: RRR  Gastrointestinal: soft, NTND, + BS  Extremities: no cyanosis, clubbing or edema   Neurological: awake and alert      MEDICATIONS  (STANDING):  albuterol/ipratropium for Nebulization 3 milliLiter(s) Nebulizer every 6 hours  cefepime   IVPB 2000 milliGRAM(s) IV Intermittent every 12 hours  chlorhexidine 2% Cloths 1 Application(s) Topical daily  dapagliflozin 10 milliGRAM(s) Oral daily  enoxaparin Injectable 40 milliGRAM(s) SubCutaneous every 24 hours  famotidine    Tablet 40 milliGRAM(s) Oral daily  fluticasone propionate/ salmeterol 250-50 MICROgram(s) Diskus 1 Dose(s) Inhalation two times a day  guaiFENesin ER 1200 milliGRAM(s) Oral every 12 hours  levothyroxine 50 MICROGram(s) Oral daily  montelukast 10 milliGRAM(s) Oral daily  predniSONE   Tablet   Oral   predniSONE   Tablet 40 milliGRAM(s) Oral daily  senna 2 Tablet(s) Oral at bedtime  tiotropium 2.5 MICROgram(s) Inhaler 2 Puff(s) Inhalation daily    MEDICATIONS  (PRN):  acetaminophen     Tablet .. 650 milliGRAM(s) Oral every 6 hours PRN Temp greater or equal to 38C (100.4F), Mild Pain (1 - 3)  melatonin 3 milliGRAM(s) Oral at bedtime PRN Insomnia      Allergies    sulfa drugs (Unknown)    Intolerances        LABS:                RADIOLOGY & ADDITIONAL TESTS:  Studies reviewed.

## 2024-12-23 VITALS
DIASTOLIC BLOOD PRESSURE: 84 MMHG | RESPIRATION RATE: 17 BRPM | TEMPERATURE: 98 F | SYSTOLIC BLOOD PRESSURE: 125 MMHG | OXYGEN SATURATION: 99 % | HEART RATE: 105 BPM

## 2024-12-23 LAB
ANION GAP SERPL CALC-SCNC: 14 MMOL/L — SIGNIFICANT CHANGE UP (ref 5–17)
BUN SERPL-MCNC: 34 MG/DL — HIGH (ref 7–23)
CALCIUM SERPL-MCNC: 9.5 MG/DL — SIGNIFICANT CHANGE UP (ref 8.4–10.5)
CHLORIDE SERPL-SCNC: 104 MMOL/L — SIGNIFICANT CHANGE UP (ref 96–108)
CO2 SERPL-SCNC: 22 MMOL/L — SIGNIFICANT CHANGE UP (ref 22–31)
CREAT SERPL-MCNC: 1.03 MG/DL — SIGNIFICANT CHANGE UP (ref 0.5–1.3)
CULTURE RESULTS: SIGNIFICANT CHANGE UP
CULTURE RESULTS: SIGNIFICANT CHANGE UP
EGFR: 79 ML/MIN/1.73M2 — SIGNIFICANT CHANGE UP
GLUCOSE SERPL-MCNC: 96 MG/DL — SIGNIFICANT CHANGE UP (ref 70–99)
HCT VFR BLD CALC: 40.3 % — SIGNIFICANT CHANGE UP (ref 39–50)
HGB BLD-MCNC: 11.9 G/DL — LOW (ref 13–17)
MCHC RBC-ENTMCNC: 27 PG — SIGNIFICANT CHANGE UP (ref 27–34)
MCHC RBC-ENTMCNC: 29.5 G/DL — LOW (ref 32–36)
MCV RBC AUTO: 91.4 FL — SIGNIFICANT CHANGE UP (ref 80–100)
NRBC # BLD: 2 /100 WBCS — HIGH (ref 0–0)
PLATELET # BLD AUTO: 127 K/UL — LOW (ref 150–400)
POTASSIUM SERPL-MCNC: 4.3 MMOL/L — SIGNIFICANT CHANGE UP (ref 3.5–5.3)
POTASSIUM SERPL-SCNC: 4.3 MMOL/L — SIGNIFICANT CHANGE UP (ref 3.5–5.3)
RBC # BLD: 4.41 M/UL — SIGNIFICANT CHANGE UP (ref 4.2–5.8)
RBC # FLD: 17.1 % — HIGH (ref 10.3–14.5)
SODIUM SERPL-SCNC: 140 MMOL/L — SIGNIFICANT CHANGE UP (ref 135–145)
SPECIMEN SOURCE: SIGNIFICANT CHANGE UP
SPECIMEN SOURCE: SIGNIFICANT CHANGE UP
WBC # BLD: 10.17 K/UL — SIGNIFICANT CHANGE UP (ref 3.8–10.5)
WBC # FLD AUTO: 10.17 K/UL — SIGNIFICANT CHANGE UP (ref 3.8–10.5)

## 2024-12-23 PROCEDURE — 94640 AIRWAY INHALATION TREATMENT: CPT

## 2024-12-23 PROCEDURE — 80053 COMPREHEN METABOLIC PANEL: CPT

## 2024-12-23 PROCEDURE — 84145 PROCALCITONIN (PCT): CPT

## 2024-12-23 PROCEDURE — 96375 TX/PRO/DX INJ NEW DRUG ADDON: CPT

## 2024-12-23 PROCEDURE — 85610 PROTHROMBIN TIME: CPT

## 2024-12-23 PROCEDURE — 93005 ELECTROCARDIOGRAM TRACING: CPT

## 2024-12-23 PROCEDURE — 84484 ASSAY OF TROPONIN QUANT: CPT

## 2024-12-23 PROCEDURE — 97161 PT EVAL LOW COMPLEX 20 MIN: CPT

## 2024-12-23 PROCEDURE — 87640 STAPH A DNA AMP PROBE: CPT

## 2024-12-23 PROCEDURE — 87641 MR-STAPH DNA AMP PROBE: CPT

## 2024-12-23 PROCEDURE — 87899 AGENT NOS ASSAY W/OPTIC: CPT

## 2024-12-23 PROCEDURE — 85025 COMPLETE CBC W/AUTO DIFF WBC: CPT

## 2024-12-23 PROCEDURE — 96374 THER/PROPH/DIAG INJ IV PUSH: CPT

## 2024-12-23 PROCEDURE — 82784 ASSAY IGA/IGD/IGG/IGM EACH: CPT

## 2024-12-23 PROCEDURE — 71045 X-RAY EXAM CHEST 1 VIEW: CPT

## 2024-12-23 PROCEDURE — 99291 CRITICAL CARE FIRST HOUR: CPT

## 2024-12-23 PROCEDURE — 87449 NOS EACH ORGANISM AG IA: CPT

## 2024-12-23 PROCEDURE — 99232 SBSQ HOSP IP/OBS MODERATE 35: CPT

## 2024-12-23 PROCEDURE — 81001 URINALYSIS AUTO W/SCOPE: CPT

## 2024-12-23 PROCEDURE — 82435 ASSAY OF BLOOD CHLORIDE: CPT

## 2024-12-23 PROCEDURE — 82803 BLOOD GASES ANY COMBINATION: CPT

## 2024-12-23 PROCEDURE — 85730 THROMBOPLASTIN TIME PARTIAL: CPT

## 2024-12-23 PROCEDURE — 87637 SARSCOV2&INF A&B&RSV AMP PRB: CPT

## 2024-12-23 PROCEDURE — 83521 IG LIGHT CHAINS FREE EACH: CPT

## 2024-12-23 PROCEDURE — 87086 URINE CULTURE/COLONY COUNT: CPT

## 2024-12-23 PROCEDURE — 87040 BLOOD CULTURE FOR BACTERIA: CPT

## 2024-12-23 PROCEDURE — 84295 ASSAY OF SERUM SODIUM: CPT

## 2024-12-23 PROCEDURE — 80048 BASIC METABOLIC PNL TOTAL CA: CPT

## 2024-12-23 PROCEDURE — 85014 HEMATOCRIT: CPT

## 2024-12-23 PROCEDURE — 82947 ASSAY GLUCOSE BLOOD QUANT: CPT

## 2024-12-23 PROCEDURE — 71250 CT THORAX DX C-: CPT | Mod: MC

## 2024-12-23 PROCEDURE — 85018 HEMOGLOBIN: CPT

## 2024-12-23 PROCEDURE — 85027 COMPLETE CBC AUTOMATED: CPT

## 2024-12-23 PROCEDURE — 82330 ASSAY OF CALCIUM: CPT

## 2024-12-23 PROCEDURE — 84132 ASSAY OF SERUM POTASSIUM: CPT

## 2024-12-23 PROCEDURE — 83605 ASSAY OF LACTIC ACID: CPT

## 2024-12-23 RX ORDER — FLUTICASONE PROPIONATE 50 UG/1
1 SPRAY, METERED NASAL
Refills: 0 | DISCHARGE

## 2024-12-23 RX ORDER — ACETAMINOPHEN 80 MG/.8ML
2 SOLUTION/ DROPS ORAL
Qty: 0 | Refills: 0 | DISCHARGE
Start: 2024-12-23

## 2024-12-23 RX ORDER — CEFUROXIME AXETIL 250 MG
1 TABLET ORAL
Qty: 4 | Refills: 0
Start: 2024-12-23 | End: 2024-12-24

## 2024-12-23 RX ORDER — FLUTICASONE FUROATE, UMECLIDINIUM BROMIDE AND VILANTEROL TRIFENATATE 100; 62.5; 25 UG/1; UG/1; UG/1
1 POWDER RESPIRATORY (INHALATION)
Qty: 0 | Refills: 0 | DISCHARGE

## 2024-12-23 RX ORDER — IXAZOMIB 2.3 MG/1
1 CAPSULE ORAL
Refills: 0 | DISCHARGE

## 2024-12-23 RX ORDER — PREDNISONE 5 MG
1 TABLET ORAL
Qty: 18 | Refills: 0
Start: 2024-12-23 | End: 2024-12-31

## 2024-12-23 RX ADMIN — Medication 100 MILLIGRAM(S): at 05:40

## 2024-12-23 RX ADMIN — DAPAGLIFLOZIN 10 MILLIGRAM(S): 5 TABLET, FILM COATED ORAL at 11:40

## 2024-12-23 RX ADMIN — FLUTICASONE PROPIONATE AND SALMETEROL 1 DOSE(S): 50; 500 POWDER ORAL; RESPIRATORY (INHALATION) at 05:41

## 2024-12-23 RX ADMIN — CHLORHEXIDINE GLUCONATE 1 APPLICATION(S): 1.2 RINSE ORAL at 11:46

## 2024-12-23 RX ADMIN — Medication 1200 MILLIGRAM(S): at 06:52

## 2024-12-23 RX ADMIN — IPRATROPIUM BROMIDE AND ALBUTEROL SULFATE 3 MILLILITER(S): .5; 2.5 SOLUTION RESPIRATORY (INHALATION) at 11:41

## 2024-12-23 RX ADMIN — IPRATROPIUM BROMIDE AND ALBUTEROL SULFATE 3 MILLILITER(S): .5; 2.5 SOLUTION RESPIRATORY (INHALATION) at 05:40

## 2024-12-23 RX ADMIN — TIOTROPIUM BROMIDE MONOHYDRATE 2 PUFF(S): 18 CAPSULE ORAL; RESPIRATORY (INHALATION) at 11:41

## 2024-12-23 RX ADMIN — IPRATROPIUM BROMIDE AND ALBUTEROL SULFATE 3 MILLILITER(S): .5; 2.5 SOLUTION RESPIRATORY (INHALATION) at 00:24

## 2024-12-23 RX ADMIN — FAMOTIDINE 40 MILLIGRAM(S): 20 TABLET, FILM COATED ORAL at 11:40

## 2024-12-23 RX ADMIN — Medication 40 MILLIGRAM(S): at 06:52

## 2024-12-23 RX ADMIN — LEVOTHYROXINE SODIUM 50 MICROGRAM(S): 175 TABLET ORAL at 05:40

## 2024-12-23 RX ADMIN — MONTELUKAST SODIUM 10 MILLIGRAM(S): 10 TABLET, FILM COATED ORAL at 11:40

## 2024-12-23 NOTE — PROGRESS NOTE ADULT - PROBLEM SELECTOR PLAN 3
-RVP + RSV  -CT chest with LL opacities, c/w abx as per ID  -Trend leukocytosis/fever curve
- RSV positive on RVP  - Continue supportive care
-RVP + RSV  -CT chest with LL opacities, c/w abx as per ID  -Trend leukocytosis/fever curve
-RVP + RSV  -CXR with RLL infiltrate  -Trend leukocytosis/fever curve  -Procalcitonin elevated. Continue to trend   -ABX as per ID   -F/u CT chest.
- RSV positive on RVP  - Continue supportive care

## 2024-12-23 NOTE — PROGRESS NOTE ADULT - PROBLEM SELECTOR PROBLEM 2
RSV infection
Acute asthma exacerbation
Acute asthma exacerbation
RSV infection
RSV infection
Acute asthma exacerbation

## 2024-12-23 NOTE — PROGRESS NOTE ADULT - PROBLEM SELECTOR PROBLEM 4
Multiple myeloma
Multiple myeloma
Acute hypoxic respiratory failure
Multiple myeloma
Acute hypoxic respiratory failure

## 2024-12-23 NOTE — PROGRESS NOTE ADULT - PROBLEM SELECTOR PLAN 2
- Due to underlying pneumonia and RSV  - Start Duonebs q6hrs + IV Solumedrol 40mg BID  - Advair + Spiriva in setting of home trelegy  - Continue montelukast 10mg QHS  - Check peak flows BID
RVP + RSV  -Bronchodilators/steroids as above  -F/u CT chest.
RVP + RSV  -Bronchodilators/steroids as above
- Due to underlying pneumonia and RSV  - Start Duonebs q6hrs + IV Solumedrol 40mg BID  - Advair + Spiriva in setting of home trelegy  - Continue montelukast 10mg QHS  - Check peak flows BID
RVP + RSV  -Bronchodilators/steroids as above
- Due to underlying pneumonia and RSV  - Start Duonebs q6hrs + IV Solumedrol 40mg BID  - Advair + Spiriva in setting of home trelegy  - Continue montelukast 10mg QHS  - Check peak flows BID

## 2024-12-23 NOTE — PROGRESS NOTE ADULT - PROBLEM SELECTOR PLAN 1
- CXR w/ RLL opacity, febrile to 102.6  - RVP + for RSV  - S/p Vanc/Cefepime/Azithro + IVNS 2.8L  - C/w Vancomycin + Cefepime + azithromycin for now  - F/u blood and sputum cultures, procalcitonin, urine strep and legionella, and MRSA/MSSA PCR  - If no evidence of bacterial infection based on workup, d/c abx, may all be due to RSV  - ECG personally reviewed, sinus tachycardia, likely in the setting of infection + hypoxia  - pulm and ID eval Appreciated
- CXR w/ RLL opacity, febrile to 102.6  - RVP + for RSV  - S/p Vanc/Cefepime/Azithro + IVNS 2.8L  - C/w Vancomycin + Cefepime + azithromycin for now  - F/u blood and sputum cultures, procalcitonin, urine strep and legionella, and MRSA/MSSA PCR  - If no evidence of bacterial infection based on workup, d/c abx, may all be due to RSV  - ECG personally reviewed, sinus tachycardia, likely in the setting of infection + hypoxia  - pulm and ID eval called
2nd to RSV infection   -Continue Duoneb q6h  -Continue Advair BID  -Continue Singulair  -Mucinex 1200 mg PO BID x 5 days   -Normoxic at rest. Check o2 sats on RA with ambulation and document.  -No wheezing on exam at this time. Continue IV solumedrol for now, pending RA sats, will likely change to prednisone this afternoon.   -Keep sats >90% with o2 PRN
- CXR w/ RLL opacity, febrile to 102.6  - RVP + for RSV  - S/p Vanc/Cefepime/Azithro + IVNS 2.8L  - C/w Vancomycin + Cefepime + azithromycin for now  - F/u blood and sputum cultures, procalcitonin, urine strep and legionella, and MRSA/MSSA PCR  - If no evidence of bacterial infection based on workup, d/c abx, may all be due to RSV  - ECG personally reviewed, sinus tachycardia, likely in the setting of infection + hypoxia  - pulm and ID eval Appreciated
2nd to RSV infection   -Continue Duoneb q6h  -Continue Advair BID  -Continue Singulair  -Mucinex 1200 mg PO BID x 5 days   -Hypoxia resolved, keep sats >90% with o2 PRN   -Wheezing improving  -S/p IV solumedrol. Prednisone 40mg PO qd x 1 more day, 30mg PO qd x 3 days, 20mg PO qd x 3 days, 10mg PO qd x 3 days then decrease to 5mg PO qd and continue until f/u in office   -Keep sats >90% with o2 PRN
2nd to RSV infection   -Continue Duoneb q6h  -Continue Advair BID  -Continue Singulair  -Mucinex 1200 mg PO BID x 5 days   -F/u CT chest  -+Wheezing on exam. Continue Solumedrol 20 mg IVP q8h for now. Will continue to evaluate for further taper   -O2 lowered to 2LNC. Wean O2 as tolerated. Keep sats >90%.
- CXR w/ RLL opacity, febrile to 102.6  - RVP + for RSV  - S/p Vanc/Cefepime/Azithro + IVNS 2.8L  - C/w Vancomycin + Cefepime + azithromycin for now  - F/u blood and sputum cultures, procalcitonin, urine strep and legionella, and MRSA/MSSA PCR  - If no evidence of bacterial infection based on workup, d/c abx, may all be due to RSV  - ECG personally reviewed, sinus tachycardia, likely in the setting of infection + hypoxia  - pulm and ID eval Appreciated  - D/C planing
- CXR w/ RLL opacity, febrile to 102.6  - RVP + for RSV  - S/p Vanc/Cefepime/Azithro + IVNS 2.8L  - C/w Vancomycin + Cefepime + azithromycin for now  - F/u blood and sputum cultures, procalcitonin, urine strep and legionella, and MRSA/MSSA PCR  - If no evidence of bacterial infection based on workup, d/c abx, may all be due to RSV  - ECG personally reviewed, sinus tachycardia, likely in the setting of infection + hypoxia  - pulm and ID eval Appreciated

## 2024-12-23 NOTE — PROGRESS NOTE ADULT - SUBJECTIVE AND OBJECTIVE BOX
DATE OF SERVICE: 12-23-24 @ 15:34    Patient is a 68y old  Male who presents with a chief complaint of AHRF, PNA (23 Dec 2024 14:05)      INTERVAL HISTORY: Feels ok.     REVIEW OF SYSTEMS:  CONSTITUTIONAL: No weakness  EYES/ENT: No visual changes;  No throat pain   NECK: No pain or stiffness  RESPIRATORY: No cough, wheezing; No shortness of breath  CARDIOVASCULAR: No chest pain or palpitations  GASTROINTESTINAL: No abdominal  pain. No nausea, vomiting, or hematemesis  GENITOURINARY: No dysuria, frequency or hematuria  NEUROLOGICAL: No stroke like symptoms  SKIN: No rashes    	  MEDICATIONS:        PHYSICAL EXAM:  T(C): 36.6 (12-23-24 @ 09:03), Max: 37.2 (12-23-24 @ 04:23)  HR: 105 (12-23-24 @ 09:03) (81 - 105)  BP: 125/84 (12-23-24 @ 09:03) (114/77 - 125/84)  RR: 17 (12-23-24 @ 09:03) (17 - 18)  SpO2: 99% (12-23-24 @ 09:03) (94% - 99%)  Wt(kg): --  I&O's Summary        Appearance: In no distress	  HEENT:    PERRL, EOMI	  Cardiovascular:  S1 S2, No JVD  Respiratory: Lungs clear to auscultation	  Gastrointestinal:  Soft, Non-tender, + BS	  Vascularature:  No edema of LE  Psychiatric: Appropriate affect   Neuro: no acute focal deficits                               11.9   10.17 )-----------( 127      ( 23 Dec 2024 06:58 )             40.3     12-23    140  |  104  |  34[H]  ----------------------------<  96  4.3   |  22  |  1.03    Ca    9.5      23 Dec 2024 07:04          Labs personally reviewed      ASSESSMENT/PLAN: 	    This is a 67 y/o male w/ PMHx multiple myeloma on Ninlaro, HFrEF (EF 47%) 2/2 NICM due to chemotherapy, hypothyroidism, and asthma who presents for fever and cough. His symptoms started 4 days ago, but got a lot worse last night, when he started developing fevers up to 102 at home and cough that is productive of yellow sputum. He went to his oncologist's (Dr. Agustin Block) office today, was found to be febrile in the office with wheezing and tachyonea. Had labs checked as well, and his CBC came back w/ a WBC of 25. He was told to go to the hospital.    1. Shortness of Breath  - CXR suggestive of PNA  - WBC elevated  - + RSV  - Hx of sHF with most recent TTE showing EF 51%  - Appears euvolemic. Shortness of breath 2/2 Viral PNA  - s/p IV Abx, steroids, supportive care    2. Chronic Systolic HF  - Prior diagnostic cath 2023 with no CAD therefore non ischemic cardiomyopathy. 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  - Continue Farxiga 10mg PO daily   - Appears euvolemic    3. DVT Ppx  - c/w Lovenox 40mg SQ daily    4. Hypothyroidism  - c/w Synthroid        NEEL Conteh-NP   Hema Lane DO [facility]  Cardiovascular Medicine  [address], [address]  Available through call or text on [employer]AM[employer]  Office: [phone]

## 2024-12-23 NOTE — PROGRESS NOTE ADULT - PROBLEM SELECTOR PLAN 4
- Currently requiring 4LNC  - Titrate supplemental oxygen as needed to maintain SpO2 >92%  - Likely 2/2 PNA + asthma exacerbation  - Monitor for improvement with steroids and abx
-Per heme/onc.
-Per heme/onc.
- Currently requiring 4LNC  - Titrate supplemental oxygen as needed to maintain SpO2 >92%  - Likely 2/2 PNA + asthma exacerbation  - Monitor for improvement with steroids and abx
-Per heme/onc.
- Currently requiring 4LNC  - Titrate supplemental oxygen as needed to maintain SpO2 >92%  - Likely 2/2 PNA + asthma exacerbation  - Monitor for improvement with steroids and abx

## 2024-12-23 NOTE — PROGRESS NOTE ADULT - PROBLEM SELECTOR PROBLEM 1
Acute asthma exacerbation
RLL pneumonia
Acute asthma exacerbation
RLL pneumonia
RLL pneumonia
Acute asthma exacerbation
RLL pneumonia
RLL pneumonia

## 2024-12-23 NOTE — PROGRESS NOTE ADULT - PROBLEM SELECTOR PLAN 6
- C/w levothyroxine 50mcg daily

## 2024-12-23 NOTE — DISCHARGE NOTE NURSING/CASE MANAGEMENT/SOCIAL WORK - FINANCIAL ASSISTANCE
[facility] provides services at a reduced cost to those who are determined to be eligible through [facility]’s financial assistance program. Information regarding [facility]’s financial assistance program can be found by going to [email or web address] or by calling [phone].

## 2024-12-23 NOTE — DISCHARGE NOTE PROVIDER - NSDCMRMEDTOKEN_GEN_ALL_CORE_FT
dapagliflozin 10 mg oral tablet: 1 tab(s) orally once a day  famotidine 40 mg oral tablet: 1 tab(s) orally once a day  fluticasone 50 mcg/inh nasal spray: 1 spray(s) in each nostril once a day as needed  levothyroxine 50 mcg (0.05 mg) oral tablet: 1 tab(s) orally once a day (in the morning)  montelukast 10 mg oral tablet: 1 tab(s) orally once a day  Ninlaro 3 mg oral capsule: 1 cap(s) orally once a week  senna (sennosides) 8.6 mg oral tablet: 2 tab(s) orally once a day (at bedtime)  Trelegy Ellipta 200 mcg-62.5 mcg-25 mcg/inh inhalation powder: 1 actuation inhaled orally once a day   acetaminophen 325 mg oral tablet: 2 tab(s) orally every 6 hours As needed Temp greater or equal to 38C (100.4F), Mild Pain (1 - 3)  cefuroxime 500 mg oral tablet: 1 tab(s) orally 2 times a day  dapagliflozin 10 mg oral tablet: 1 tab(s) orally once a day  famotidine 40 mg oral tablet: 1 tab(s) orally once a day  levothyroxine 50 mcg (0.05 mg) oral tablet: 1 tab(s) orally once a day (in the morning)  montelukast 10 mg oral tablet: 1 tab(s) orally once a day  predniSONE 10 mg oral tablet: 1 tab(s) orally once a day Taper as follows starting 12/24  30mg X 3 days  20mg X 3 days  10mg X 3 days  senna (sennosides) 8.6 mg oral tablet: 2 tab(s) orally once a day (at bedtime)  Trelegy Ellipta 200 mcg-62.5 mcg-25 mcg/inh inhalation powder: 1 puff(s) inhaled once a day 1 actuation inhaled orally once a day

## 2024-12-23 NOTE — PROGRESS NOTE ADULT - PROBLEM SELECTOR PLAN 5
- Seems to have recovered per last TTE - EF 51%  - C/w Farxiga 10mg daily  - card tatum appreciated

## 2024-12-23 NOTE — PROGRESS NOTE ADULT - PROBLEM SELECTOR PROBLEM 5
Heart failure with mildly reduced ejection fraction (HFmrEF)

## 2024-12-23 NOTE — DISCHARGE NOTE PROVIDER - CARE PROVIDER_API CALL
SHANNA LEYVA  [address] [place] [address], [address]  [place] [address]  Phone: [phone]  Fax: [phone]  Follow Up Time:

## 2024-12-23 NOTE — DISCHARGE NOTE PROVIDER - NSDCCPCAREPLAN_GEN_ALL_CORE_FT
PRINCIPAL DISCHARGE DIAGNOSIS  Diagnosis: Sepsis with acute hypoxic respiratory failure  Assessment and Plan of Treatment: Completed antibiotics as prescribed  Complete steroids as prescribed   Follow up with your primary care provider

## 2024-12-23 NOTE — DISCHARGE NOTE PROVIDER - HOSPITAL COURSE
67 y/o male w/ PMHx multiple myeloma on chemo (daratumumab?), HFrEF (EF 47%) 2/2 NICM due to chemotherapy, hypothyroidism, and asthma who presents for fever and productive cough. Found to have RLL opacity on CXR along w/ wheezing on exam. Admitted for AHRF in setting of RLL pneumonia + RSV complicated by asthma exacerbation.       Problem/Plan - 1:  ·  Problem: RLL pneumonia.   ·  Plan: - CXR w/ RLL opacity, febrile to 102.6  - RVP + for RSV  - S/p Vanc/Cefepime/Azithro + IVNS 2.8L  - C/w Vancomycin + Cefepime + azithromycin for now  - F/u blood and sputum cultures, procalcitonin, urine strep and legionella, and MRSA/MSSA PCR  - If no evidence of bacterial infection based on workup, d/c abx, may all be due to RSV  - ECG personally reviewed, sinus tachycardia, likely in the setting of infection + hypoxia  - pulm and ID eval following     Problem/Plan - 2:  ·  Problem: Acute asthma exacerbation.   ·  Plan: - Due to underlying pneumonia and RSV  - Start Duonebs q6hrs + IV Solumedrol 40mg BID  - Advair + Spiriva in setting of home trelegy  - Continue montelukast 10mg QHS  - Check peak flows BID.     Problem/Plan - 3:  ·  Problem: RSV infection.   ·  Plan: - RSV positive on RVP  - Continue supportive care.     Problem/Plan - 4:  ·  Problem: Acute hypoxic respiratory failure.   ·  Plan: - Currently requiring 4LNC  - Titrate supplemental oxygen as needed to maintain SpO2 >92%  - Likely 2/2 PNA + asthma exacerbation  - Monitor for improvement with steroids and abx.

## 2024-12-23 NOTE — PROGRESS NOTE ADULT - NS ATTEND AMEND GEN_ALL_CORE FT
Patient care and plan discussed and reviewed with Advanced Care Provider. Plan as outlined above edited by me to reflect our discussion.
-Continue Duoneb q6h  -Continue Advair BID  -Continue Singulair  -Change to prednisone 40mg/d and taper over 14d to 5mg/d   -keep sat>90%  -Continue abx

## 2024-12-23 NOTE — PROGRESS NOTE ADULT - ASSESSMENT
67 y/o male w/ PMHx multiple myeloma on Ninlaro, HFrEF (EF 47%) 2/2 NICM due to chemotherapy, hypothyroidism, and asthma who presents for fever and cough. His symptoms started 4 days ago, but got a lot worse last night, when he started developing fevers up to 102 at home and cough that is productive of yellow sputum.  Admitted for Pneumonia, RLL and RSV+     1. IgA Lambda Multiple Myeloma s/p ASCT in [facility] Dec 2021   - Day +100 assessment 10/2022in [facility] (RONNI negative, SFLC­ normal)  - was on velcade maintenance due to revlimid intolerance   - POD 10/3/23 IgA and Lambda >25% increase which DKd was started. Unfortunately after first dose of kyprolis acute systolic heart failure requiring hospitalization   - Continued on darzalex and decadron but started on pomalyst which did not tolerate despite dose reductions (primarily significant fatigue impacting QoL)   - Ninlaro started in combination with Darzalex- dose reduction required due to thrombocytopenia which he has been tolerating since   - He is currently on his off week of Ninlaro which he takes weekly x3, off x1 week   - last darzalex on 12/5/24  - no plans for inpatient treatment of his multiple myeloma, plan to resume once recovers from acute illness   - follow up outpatient with Dr Block at [facility] after discharge     2. RSV Pneumonia   - presented to clinic on 12/18 with cough, fever, leukocytosis and adventitious sounds on respiratory exam concerning for pneumonia- sent to the ER   - CXR right lower lobe opacity   - CT Chest with bilateral lower lobe opacities, agree with PNA tx   - appreciate pulmonary evaluation- steroids for asthma exacerbation   - RVP positive for RSV   - continue with antibiotics especially in the setting of myleoma on treatment   - history of recurrent respiratory infections, IgG has not been low enough to give infusion, repeated again and >500. Holding on IVIG therapy at this time     Agustin Block MD   [facility]   Hematology/Oncology   [phone]  
67 y/o male w/ PMHx multiple myeloma on Ninlaro, HFrEF (EF 47%) 2/2 NICM due to chemotherapy, hypothyroidism, and asthma who presents for fever and cough. His symptoms started 4 days ago, but got a lot worse last night, when he started developing fevers up to 102 at home and cough that is productive of yellow sputum.  Admitted for Pneumonia, RLL and RSV+     1. IgA Lambda Multiple Myeloma s/p ASCT in [facility] Dec 2021   - Day +100 assessment 10/2022in CR (RONNI negative, SFLC­ normal)  - was on velcade maintenance due to revlimid intolerance   - POD 10/3/23 IgA and Lambda >25% increase which DKd was started. Unfortunately after first dose of kyprolis acute systolic heart failure requiring hospitalization   - Continued on darzalex and decadron but started on pomalyst which did not tolerate despite dose reductions (primarily significant fatigue impacting QoL)   - Ninlaro started in combination with Darzalex- dose reduction required due to thrombocytopenia which he has been tolerating since   - He is currently on his off week of Ninlaro which he takes weekly x3, off x1 week   - last darzalex on 12/5/24  - no plans for inpatient treatment of his multiple myeloma   - follow up outpatient with Dr Block at [facility] after discharge     2. RSV Pneumonia   - presented to clinic on 12/18 with cough, fever, leukocytosis and adventitious sounds on respiratory exam concerning for pneumonia- sent to the ER   - CXR right lower lobe opacity   - CT Chest   - appreciate pulmonary evaluation- steroids for asthma exacerbation   - RVP positive for RSV   - continue with antibiotics especially in the setting of myleoma on treatment   - history of recurrent respiratory infections, IgG has not been low enough to give infusion, repeated again and >500. Holding on IVIG therapy at this time     Agustin Block MD   [facility]   Hematology/Oncology   [phone]  
67 y/o male w/ PMHx multiple myeloma on Ninlaro, HFrEF (EF 47%) 2/2 NICM due to chemotherapy, hypothyroidism, and asthma who presents for fever and cough. His symptoms started 4 days ago, but got a lot worse last night, when he started developing fevers up to 102 at home and cough that is productive of yellow sputum.  Admitted for Pneumonia, RLL and RSV+     1. IgA Lambda Multiple Myeloma s/p ASCT in [facility] Dec 2021   - Day +100 assessment 10/2022in [facility] (RONNI negative, SFLC­ normal)  - was on velcade maintenance due to revlimid intolerance   - POD 10/3/23 IgA and Lambda >25% increase which DKd was started. Unfortunately after first dose of kyprolis acute systolic heart failure requiring hospitalization   - Continued on darzalex and decadron but started on pomalyst which did not tolerate despite dose reductions (primarily significant fatigue impacting QoL)   - Ninlaro started in combination with Darzalex- dose reduction required due to thrombocytopenia which he has been tolerating since   - He is currently on his off week of Ninlaro which he takes weekly x3, off x1 week   - last darzalex on 12/5/24  - no plans for inpatient treatment of his multiple myeloma, plan to resume once recovers from acute illness   - follow up outpatient with Dr Block at [facility] after discharge     2. RSV Pneumonia   - presented to clinic on 12/18 with cough, fever, leukocytosis and adventitious sounds on respiratory exam concerning for pneumonia- sent to the ER   - CXR right lower lobe opacity   - CT Chest with bilateral lower lobe opacities, agree with PNA tx   - appreciate pulmonary evaluation- steroids for asthma exacerbation   - RVP positive for RSV   - continue with antibiotics especially in the setting of myleoma on treatment   - history of recurrent respiratory infections, IgG has not been low enough to give infusion, repeated again and >500. Holding on IVIG therapy at this time     Agustin Block MD   [facility]   Hematology/Oncology   [phone]  
69 y/o male w/ PMHx multiple myeloma on chemo (daratumumab?), HFrEF (EF 47%) 2/2 NICM due to chemotherapy, hypothyroidism, and asthma who presents for fever and productive cough. Found to have RLL opacity on CXR along w/ wheezing on exam. Admitted for AHRF in setting of RLL pneumonia + RSV complicated by asthma exacerbation.
Overall, PNA, RSV, Leukocytosis  - Cefepime 2g q 12, plan for 7 day course, consider PO options when improved/DC planning  - DC Azithromycin  - F/U BCXs  - Supportive care for RSV      Data/Rationale:  67 yo M ANTON De Dios, with fever/cough  Fever, leukocytosis  Fever, cough, viral episode as outpatient, then leukocytosis  RSV+  CXR RLL opacity  UA negative, UCX neg  BCXs pending  MRSA/MSSA negative  Suspected Bacterial PNA superimposed on Viral process  CT Chest with patchy opacities in both lungs    Finesse Esqueda MD  Contact on TEAMS messaging from 9am - 5pm  From 5pm-9am, on weekends, or if no response call [phone]
Overall, PNA, RSV, Leukocytosis  - Cefepime 2g q 12, plan for 7 day course, when DC planning can send out on Ceftin 500mg po q 12 to complete course  - F/U BCXs  - Supportive care for RSV      Data/Rationale:  67 yo M MM Lanre, with fever/cough  Fever, leukocytosis  Fever, cough, viral episode as outpatient, then leukocytosis  RSV+  CXR RLL opacity  UA negative, UCX neg  BCXs pending  MRSA/MSSA negative  Suspected Bacterial PNA superimposed on Viral process  CT Chest with patchy opacities in both lungs    Finesse Esqueda MD  Contact on TEAMS messaging from 9am - 5pm  From 5pm-9am, on weekends, or if no response call [phone]
69 y/o M with PMH of multiple myeloma on Ninlaro, HFrEF (EF 47%) 2/2 NICM due to chemotherapy, hypothyroidism, and asthma who presents for fever and cough. His symptoms started 4 days ago, but got a lot worse last night, when he started developing fevers up to 102F at home and cough that is productive of yellow sputum. He went to his oncologist's (Dr. Agustin Block) office, was found to be febrile with wheezing and tachypnea. In the ED, he was febrile to 102.6, tachycardic, tachypneic and requiring 4LNC. CXR with RLL opacity. Given Vanc/Cefepime/azithromycin. S/p Duonebs x3, mag sulfate and IV solumedrol 40mg in the ED. RVP + RSV.   
67 y/o male w/ PMHx multiple myeloma on chemo (daratumumab?), HFrEF (EF 47%) 2/2 NICM due to chemotherapy, hypothyroidism, and asthma who presents for fever and productive cough. Found to have RLL opacity on CXR along w/ wheezing on exam. Admitted for AHRF in setting of RLL pneumonia + RSV complicated by asthma exacerbation.
69 y/o M with PMH of multiple myeloma on Ninlaro, HFrEF (EF 47%) 2/2 NICM due to chemotherapy, hypothyroidism, and asthma who presents for fever and cough. His symptoms started 4 days ago, but got a lot worse last night, when he started developing fevers up to 102F at home and cough that is productive of yellow sputum. He went to his oncologist's (Dr. Agustin Block) office, was found to be febrile with wheezing and tachypnea. In the ED, he was febrile to 102.6, tachycardic, tachypneic and requiring 4LNC. CXR with RLL opacity. Given Vanc/Cefepime/azithromycin. S/p Duonebs x3, mag sulfate and IV solumedrol 40mg in the ED. RVP + RSV.   
67 y/o M with PMH of multiple myeloma on Ninlaro, HFrEF (EF 47%) 2/2 NICM due to chemotherapy, hypothyroidism, and asthma who presents for fever and cough. His symptoms started 4 days ago, but got a lot worse last night, when he started developing fevers up to 102F at home and cough that is productive of yellow sputum. He went to his oncologist's (Dr. Agustin Block) office, was found to be febrile with wheezing and tachypnea. In the ED, he was febrile to 102.6, tachycardic, tachypneic and requiring 4LNC. CXR with RLL opacity. Given Vanc/Cefepime/azithromycin. S/p Duonebs x3, mag sulfate and IV solumedrol 40mg in the ED. RVP + RSV.   
67 y/o male w/ PMHx multiple myeloma on chemo (daratumumab?), HFrEF (EF 47%) 2/2 NICM due to chemotherapy, hypothyroidism, and asthma who presents for fever and productive cough. Found to have RLL opacity on CXR along w/ wheezing on exam. Admitted for AHRF in setting of RLL pneumonia + RSV complicated by asthma exacerbation.
69 y/o male w/ PMHx multiple myeloma on chemo (daratumumab?), HFrEF (EF 47%) 2/2 NICM due to chemotherapy, hypothyroidism, and asthma who presents for fever and productive cough. Found to have RLL opacity on CXR along w/ wheezing on exam. Admitted for AHRF in setting of RLL pneumonia + RSV complicated by asthma exacerbation.
67 y/o male w/ PMHx multiple myeloma on chemo (daratumumab?), HFrEF (EF 47%) 2/2 NICM due to chemotherapy, hypothyroidism, and asthma who presents for fever and productive cough. Found to have RLL opacity on CXR along w/ wheezing on exam. Admitted for AHRF in setting of RLL pneumonia + RSV complicated by asthma exacerbation.

## 2024-12-23 NOTE — PROGRESS NOTE ADULT - PROBLEM SELECTOR PLAN 7
- Getting oral chemotherapy outpatient - on Ninlaro 3mg once a week for 3 weeks, followed by 1 week break - currently on the break week

## 2024-12-23 NOTE — PROGRESS NOTE ADULT - SUBJECTIVE AND OBJECTIVE BOX
Name of Patient : CARMEN BERNSTEIN  MRN: [id number]      Subjective: Patient seen and examined. No new events except as noted.     REVIEW OF SYSTEMS:    CONSTITUTIONAL: No weakness, fevers or chills  EYES/ENT: No visual changes;  No vertigo or throat pain   NECK: No pain or stiffness  RESPIRATORY: No cough, wheezing, hemoptysis; No shortness of breath  CARDIOVASCULAR: No chest pain or palpitations  GASTROINTESTINAL: No abdominal or epigastric pain. No nausea, vomiting, or hematemesis; No diarrhea or constipation. No melena or hematochezia.  GENITOURINARY: No dysuria, frequency or hematuria  NEUROLOGICAL: No numbness or weakness  SKIN: No itching, burning, rashes, or lesions   All other review of systems is negative unless indicated above.    MEDICATIONS:  MEDICATIONS  (STANDING):  albuterol/ipratropium for Nebulization 3 milliLiter(s) Nebulizer every 6 hours  cefepime   IVPB 2000 milliGRAM(s) IV Intermittent every 12 hours  chlorhexidine 2% Cloths 1 Application(s) Topical daily  dapagliflozin 10 milliGRAM(s) Oral daily  enoxaparin Injectable 40 milliGRAM(s) SubCutaneous every 24 hours  famotidine    Tablet 40 milliGRAM(s) Oral daily  fluticasone propionate/ salmeterol 250-50 MICROgram(s) Diskus 1 Dose(s) Inhalation two times a day  guaiFENesin ER 1200 milliGRAM(s) Oral every 12 hours  levothyroxine 50 MICROGram(s) Oral daily  montelukast 10 milliGRAM(s) Oral daily  predniSONE   Tablet   Oral   predniSONE   Tablet 40 milliGRAM(s) Oral daily  senna 2 Tablet(s) Oral at bedtime  tiotropium 2.5 MICROgram(s) Inhaler 2 Puff(s) Inhalation daily      PHYSICAL EXAM:  T(C): 36.6 (12-23-24 @ 09:03), Max: 37.2 (12-23-24 @ 04:23)  HR: 105 (12-23-24 @ 09:03) (81 - 105)  BP: 125/84 (12-23-24 @ 09:03) (123/76 - 125/84)  RR: 17 (12-23-24 @ 09:03) (17 - 18)  SpO2: 99% (12-23-24 @ 09:03) (98% - 99%)  Wt(kg): --  I&O's Summary        Appearance: Normal	  HEENT:  PERRLA   Lymphatic: No lymphadenopathy   Cardiovascular: Normal S1 S2, no JVD  Respiratory: normal effort , clear  Gastrointestinal:  Soft, Non-tender  Skin: No rashes,  warm to touch  Psychiatry:  Mood & affect appropriate  Musculuskeletal: No edema    recent labs, Imaging and EKGs personally reviewed                             11.9   10.17 )-----------( 127      ( 23 Dec 2024 06:58 )             40.3               12-23    140  |  104  |  34[H]  ----------------------------<  96  4.3   |  22  |  1.03    Ca    9.5      23 Dec 2024 07:04                         Urinalysis Basic - ( 23 Dec 2024 07:04 )    Color: x / Appearance: x / SG: x / pH: x  Gluc: 96 mg/dL / Ketone: x  / Bili: x / Urobili: x   Blood: x / Protein: x / Nitrite: x   Leuk Esterase: x / RBC: x / WBC x   Sq Epi: x / Non Sq Epi: x / Bacteria: x

## 2024-12-23 NOTE — DISCHARGE NOTE NURSING/CASE MANAGEMENT/SOCIAL WORK - PATIENT PORTAL LINK FT
You can access the FollowMyHealth Patient Portal offered by [facility] by registering at the following website: [email or web address]. By joining [facility]’s FollowMyHealth portal, you will also be able to view your health information using other applications (apps) compatible with our system.

## 2024-12-23 NOTE — PROGRESS NOTE ADULT - PROVIDER SPECIALTY LIST ADULT
Cardiology
Cardiology
Infectious Disease
Cardiology
Infectious Disease
Pulmonology
Cardiology
Heme/Onc
Pulmonology
Heme/Onc
Heme/Onc
Internal Medicine
Pulmonology
Internal Medicine

## 2024-12-23 NOTE — PROGRESS NOTE ADULT - SUBJECTIVE AND OBJECTIVE BOX
CC: F/U for PNA    Saw/spoke to patient. No fevers, no chills.     Allergies  sulfa drugs (Unknown)    ANTIMICROBIALS:  cefepime   IVPB 2000 every 12 hours    PE:    Vital Signs Last 24 Hrs  T(C): 36.6 (23 Dec 2024 09:03), Max: 37.2 (23 Dec 2024 04:23)  T(F): 97.9 (23 Dec 2024 09:03), Max: 99 (23 Dec 2024 04:23)  HR: 105 (23 Dec 2024 09:03) (81 - 105)  BP: 125/84 (23 Dec 2024 09:03) (114/77 - 125/84)  RR: 17 (23 Dec 2024 09:03) (17 - 18)  SpO2: 99% (23 Dec 2024 09:03) (94% - 99%)    Gen: AOx3, NAD, non-toxic  CV: Nontachycardic  Resp: Breathing comfortably, RA  Abd: Soft, nontender  IV/Skin: No thrombophlebitis    LABS:                        11.9   10.17 )-----------( 127      ( 23 Dec 2024 06:58 )             40.3     12-23    140  |  104  |  34[H]  ----------------------------<  96  4.3   |  22  |  1.03    Ca    9.5      23 Dec 2024 07:04    Urinalysis Basic - ( 23 Dec 2024 07:04 )    Color: x / Appearance: x / SG: x / pH: x  Gluc: 96 mg/dL / Ketone: x  / Bili: x / Urobili: x   Blood: x / Protein: x / Nitrite: x   Leuk Esterase: x / RBC: x / WBC x   Sq Epi: x / Non Sq Epi: x / Bacteria: x    MICROBIOLOGY:    Clean Catch Clean Catch (Midstream)  12-18-24   <10,000 CFU/mL Normal Urogenital Genet  --  --    .Blood BLOOD  12-18-24   No growth at 4 days  --  --    .Blood BLOOD  12-18-24   No growth at 4 days  --  --    RADIOLOGY:    12/20    IMPRESSION: Patchy opacities are noted within both lungs, more so in both   lower lobes. Exact etiology is unclear.

## 2024-12-23 NOTE — PROGRESS NOTE ADULT - REASON FOR ADMISSION
AHRF, PNA

## 2025-04-18 NOTE — PROVIDER CONTACT NOTE (MEDICATION) - ASSESSMENT
done Pt is A/OX4 independent, Daughter is at bedside. Both patient and daughter are refusing insulin administration.